# Patient Record
Sex: FEMALE | Race: WHITE | NOT HISPANIC OR LATINO | Employment: PART TIME | URBAN - METROPOLITAN AREA
[De-identification: names, ages, dates, MRNs, and addresses within clinical notes are randomized per-mention and may not be internally consistent; named-entity substitution may affect disease eponyms.]

---

## 2017-08-30 ENCOUNTER — OFFICE VISIT (OUTPATIENT)
Dept: URGENT CARE | Facility: CLINIC | Age: 77
End: 2017-08-30
Payer: MEDICARE

## 2017-08-30 VITALS
OXYGEN SATURATION: 98 % | SYSTOLIC BLOOD PRESSURE: 157 MMHG | HEIGHT: 62 IN | BODY MASS INDEX: 28.71 KG/M2 | DIASTOLIC BLOOD PRESSURE: 76 MMHG | WEIGHT: 156 LBS | TEMPERATURE: 98 F | HEART RATE: 66 BPM

## 2017-08-30 DIAGNOSIS — N39.0 URINARY TRACT INFECTION WITHOUT HEMATURIA, SITE UNSPECIFIED: ICD-10-CM

## 2017-08-30 DIAGNOSIS — R42 DIZZINESS: ICD-10-CM

## 2017-08-30 DIAGNOSIS — B34.9 VIRAL SYNDROME: Primary | ICD-10-CM

## 2017-08-30 LAB
BILIRUB UR QL STRIP: NEGATIVE
CTP QC/QA: YES
FLUAV AG NPH QL: NEGATIVE
FLUBV AG NPH QL: NEGATIVE
GLUCOSE SERPL-MCNC: 142 MG/DL (ref 70–110)
GLUCOSE UR QL STRIP: NEGATIVE
KETONES UR QL STRIP: NEGATIVE
LEUKOCYTE ESTERASE UR QL STRIP: POSITIVE
PH, POC UA: 5.5 (ref 5–8)
POC BLOOD, URINE: NEGATIVE
POC NITRATES, URINE: NEGATIVE
PROT UR QL STRIP: NEGATIVE
SP GR UR STRIP: 1.01 (ref 1–1.03)
UROBILINOGEN UR STRIP-ACNC: NORMAL (ref 0.1–1.1)

## 2017-08-30 PROCEDURE — 82948 REAGENT STRIP/BLOOD GLUCOSE: CPT | Mod: S$GLB,,, | Performed by: FAMILY MEDICINE

## 2017-08-30 PROCEDURE — 1159F MED LIST DOCD IN RCRD: CPT | Mod: S$GLB,,, | Performed by: FAMILY MEDICINE

## 2017-08-30 PROCEDURE — 99203 OFFICE O/P NEW LOW 30 MIN: CPT | Mod: 25,S$GLB,, | Performed by: FAMILY MEDICINE

## 2017-08-30 PROCEDURE — 87804 INFLUENZA ASSAY W/OPTIC: CPT | Mod: QW,S$GLB,, | Performed by: FAMILY MEDICINE

## 2017-08-30 PROCEDURE — 81003 URINALYSIS AUTO W/O SCOPE: CPT | Mod: QW,S$GLB,, | Performed by: FAMILY MEDICINE

## 2017-08-30 PROCEDURE — 1125F AMNT PAIN NOTED PAIN PRSNT: CPT | Mod: S$GLB,,, | Performed by: FAMILY MEDICINE

## 2017-08-30 RX ORDER — ICOSAPENT ETHYL 1000 MG/1
1 CAPSULE ORAL 2 TIMES DAILY
COMMUNITY
End: 2023-03-13

## 2017-08-30 RX ORDER — OMEPRAZOLE AND SODIUM BICARBONATE 40; 1100 MG/1; MG/1
1 CAPSULE ORAL
Status: ON HOLD | COMMUNITY
End: 2020-11-02 | Stop reason: HOSPADM

## 2017-08-30 RX ORDER — FOLIC ACID 0.4 MG
400 TABLET ORAL DAILY
COMMUNITY
End: 2023-03-13

## 2017-08-30 RX ORDER — SULFAMETHOXAZOLE AND TRIMETHOPRIM 800; 160 MG/1; MG/1
1 TABLET ORAL 2 TIMES DAILY
Qty: 14 TABLET | Refills: 0 | Status: SHIPPED | OUTPATIENT
Start: 2017-08-30 | End: 2017-09-06

## 2017-08-30 RX ORDER — ASCORBIC ACID 1000 MG
TABLET ORAL
COMMUNITY
End: 2023-03-13

## 2017-08-30 RX ORDER — ONDANSETRON 4 MG/1
4 TABLET, FILM COATED ORAL EVERY 8 HOURS PRN
Qty: 20 TABLET | Refills: 0 | Status: SHIPPED | OUTPATIENT
Start: 2017-08-30 | End: 2017-09-06

## 2017-08-30 RX ORDER — LANOLIN ALCOHOL/MO/W.PET/CERES
100 CREAM (GRAM) TOPICAL DAILY
COMMUNITY

## 2017-08-30 RX ORDER — NABUMETONE 750 MG/1
750 TABLET, FILM COATED ORAL 2 TIMES DAILY
Status: ON HOLD | COMMUNITY
End: 2020-11-02 | Stop reason: HOSPADM

## 2017-08-30 RX ORDER — ASPIRIN 81 MG/1
81 TABLET ORAL DAILY
COMMUNITY

## 2017-08-30 RX ORDER — FERROUS SULFATE, DRIED 160(50) MG
1 TABLET, EXTENDED RELEASE ORAL 2 TIMES DAILY WITH MEALS
COMMUNITY

## 2017-08-30 RX ORDER — LATANOPROST 50 UG/ML
1 SOLUTION/ DROPS OPHTHALMIC NIGHTLY
COMMUNITY

## 2017-08-30 RX ORDER — CALCIUM CARBONATE 600 MG
600 TABLET ORAL ONCE
COMMUNITY
End: 2023-03-13

## 2017-08-30 RX ORDER — SIMVASTATIN 40 MG/1
40 TABLET, FILM COATED ORAL NIGHTLY
Status: ON HOLD | COMMUNITY
End: 2020-11-01 | Stop reason: HOSPADM

## 2017-08-30 RX ORDER — LOSARTAN POTASSIUM 50 MG/1
50 TABLET ORAL DAILY
Status: ON HOLD | COMMUNITY
End: 2020-11-03 | Stop reason: HOSPADM

## 2017-08-30 NOTE — PROGRESS NOTES
"Subjective:       Patient ID: Ayaka Ellison is a 77 y.o. female.    Vitals:  height is 5' 2" (1.575 m) and weight is 70.8 kg (156 lb). Her temperature is 97.9 °F (36.6 °C). Her blood pressure is 157/76 (abnormal) and her pulse is 66. Her oxygen saturation is 98%.     Chief Complaint: Nausea (Pt. has had intermittent nausea for a week); Generalized Body Aches (Pt. has had body aches for a week); and Headache (Dull headache)    Patient has been having aches and pains and nausea for the past week that isn't resolving.      Nausea   This is a new problem. The current episode started in the past 7 days. The problem occurs constantly. The problem has been unchanged. Associated symptoms include congestion, fatigue, headaches, myalgias, nausea and weakness. The symptoms are aggravated by walking, twisting, standing and exertion. She has tried nothing for the symptoms. The treatment provided no relief.   Headache    This is a new problem. The current episode started in the past 7 days. The problem occurs daily. The problem has been unchanged. The pain is located in the occipital, frontal, parietal and temporal region. The pain does not radiate. The quality of the pain is described as dull. The pain is at a severity of 5/10. The pain is mild. Associated symptoms include muscle aches, nausea and weakness. Nothing aggravates the symptoms. She has tried nothing for the symptoms. The treatment provided no relief.     Review of Systems   Constitution: Positive for fatigue, weakness and malaise/fatigue.   HENT: Positive for congestion and headaches.    Respiratory: Positive for shortness of breath.    Musculoskeletal: Positive for myalgias.   Gastrointestinal: Positive for nausea.       Objective:      Physical Exam   Constitutional: She is oriented to person, place, and time. She appears well-developed and well-nourished.   HENT:   Head: Normocephalic and atraumatic.   Eyes: EOM are normal. Pupils are equal, round, and " reactive to light.   Neck: Normal range of motion. Neck supple. No JVD present. No tracheal deviation present. No thyromegaly present.   Cardiovascular: Normal rate, regular rhythm and normal heart sounds.  Exam reveals no gallop and no friction rub.    No murmur heard.  Pulmonary/Chest: Breath sounds normal. No respiratory distress. She has no wheezes. She has no rales. She exhibits no tenderness.   Abdominal: Soft. Bowel sounds are normal. She exhibits no distension. There is no tenderness. There is no rebound and no guarding.   Genitourinary:   Genitourinary Comments: No bladder tenderness, no cva tenderness.   Musculoskeletal: Normal range of motion. She exhibits no edema, tenderness or deformity.   Lymphadenopathy:     She has no cervical adenopathy.   Neurological: She is alert and oriented to person, place, and time. She displays normal reflexes. No cranial nerve deficit. She exhibits normal muscle tone. Coordination normal.   Skin: Skin is warm. Capillary refill takes less than 2 seconds. No rash noted. No erythema. No pallor.   Psychiatric: She has a normal mood and affect. Her behavior is normal. Judgment and thought content normal.   Vitals reviewed.      Assessment:       1. Viral syndrome    2. Dizziness    3. Urinary tract infection without hematuria, site unspecified        Plan:         Viral syndrome  -     POCT Influenza A/B  -     ondansetron (ZOFRAN) 4 MG tablet; Take 1 tablet (4 mg total) by mouth every 8 (eight) hours as needed for Nausea.  Dispense: 20 tablet; Refill: 0    Dizziness  -     POCT glucose  -     POCT Urinalysis, Dipstick, Automated, W/O Scope    Urinary tract infection without hematuria, site unspecified  -     sulfamethoxazole-trimethoprim 800-160mg (BACTRIM DS) 800-160 mg Tab; Take 1 tablet by mouth 2 (two) times daily.  Dispense: 14 tablet; Refill: 0      Follow up with your doctor in a few days as needed.  Return to the urgent care or go to the ER if symptoms get  worse.    Davide Dent MD

## 2017-08-30 NOTE — PATIENT INSTRUCTIONS
"  Viral Syndrome (Adult)  A viral illness may cause a number of symptoms. The symptoms depend on the part of the body that the virus affects. If it settles in your nose, throat, and lungs, it may cause cough, sore throat, congestion, and sometimes headache. If it settles in your stomach and intestinal tract, it may cause vomiting and diarrhea. Sometimes it causes vague symptoms like "aching all over," feeling tired, loss of appetite, or fever.  A viral illness usually lasts 1 to 2 weeks, but sometimes it lasts longer. In some cases, a more serious infection can look like a viral syndrome in the first few days of the illness. You may need another exam and additional tests to know the difference. Watch for the warning signs listed below.  Home care  Follow these guidelines for taking care of yourself at home:  · If symptoms are severe, rest at home for the first 2 to 3 days.  · Stay away from cigarette smoke - both your smoke and the smoke from others.  · You may use over-the-counter acetaminophen or ibuprofen for fever, muscle aching, and headache, unless another medicine was prescribed for this. If you have chronic liver or kidney disease or ever had a stomach ulcer or GI bleeding, talk with your doctor before using these medicines. No one who is younger than 18 and ill with a fever should take aspirin. It may cause severe disease or death.  · Your appetite may be poor, so a light diet is fine. Avoid dehydration by drinking 8 to 12 8-ounce glasses of fluids each day. This may include water; orange juice; lemonade; apple, grape, and cranberry juice; clear fruit drinks; electrolyte replacement and sports drinks; and decaffeinated teas and coffee. If you have been diagnosed with a kidney disease, ask your doctor how much and what types of fluids you should drink to prevent dehydration. If you have kidney disease, drinking too much fluid can cause it build up in the your body and be dangerous to your " health.  · Over-the-counter remedies won't shorten the length of the illness but may be helpful for cough, sore throat; and nasal and sinus congestion. Don't use decongestants if you have high blood pressure.  Follow-up care  Follow up with your healthcare provider if you do not improve over the next week.  Call 911  Get emergency medical care if any of the following occur:  · Convulsion  · Feeling weak, dizzy, or like you are going to faint  · Chest pain, shortness of breath, wheezing, or difficulty breathing  When to seek medical advice  Call your healthcare provider right away if any of these occur:  · Cough with lots of colored sputum (mucus) or blood in your sputum  · Chest pain, shortness of breath, wheezing, or difficulty breathing  · Severe headache; face, neck, or ear pain  · Severe, constant pain in the lower right side of your belly (abdominal)  · Continued vomiting (cant keep liquids down)  · Frequent diarrhea (more than 5 times a day); blood (red or black color) or mucus in diarrhea  · Feeling weak, dizzy, or like you are going to faint  · Extreme thirst  · Fever of 100.4°F (38°C) or higher, or as directed by your healthcare provider  Date Last Reviewed: 9/25/2015  © 6998-9215 [employer]. [address] [place] [address], [place] [address]. All rights reserved. This information is not intended as a substitute for professional medical care. Always follow your healthcare professional's instructions.      Bladder Infection, Female (Adult)    Urine is normally doesn't have any bacteria in it. But bacteria can get into the urinary tract from the skin around the rectum. Or they can travel in the blood from elsewhere in the body. Once they are in your urinary tract, they can cause infection in the urethra (urethritis), the bladder (cystitis), or the kidneys (pyelonephritis).  The most common place for an infection is in the bladder. This is called a bladder infection. This is one of the most common  "infections in women. Most bladder infections are easily treated. They are not serious unless the infection spreads to the kidney.  The phrases "bladder infection," "UTI," and "cystitis" are often used to describe the same thing. But they are not always the same. Cystitis is an inflammation of the bladder. The most common cause of cystitis is an infection.  Symptoms  The infection causes inflammation in the urethra and bladder. This causes many of the symptoms. The most common symptoms of a bladder infection are:  · Pain or burning when urinating  · Having to urinate more often than usual  · Urgent need to urinate  · Only a small amount of urine comes out  · Blood in urine  · Abdominal discomfort. This is usually in the lower abdomen above the pubic bone.  · Cloudy urine  · Strong- or bad-smelling urine  · Unable to urinate (urinary retention)  · Unable to hold urine in (urinary incontinence)  · Fever  · Loss of appetite  · Confusion (in older adults)  Causes  Bladder infections are not contagious. You can't get one from someone else, from a toilet seat, or from sharing a bath.  The most common cause of bladder infections is bacteria from the bowels. The bacteria get onto the skin around the opening of the urethra. From there, they can get into the urine and travel up to the bladder, causing inflammation and infection. This usually happens because of:  · Wiping improperly after urinating. Always wipe from front to back.  · Bowel incontinence  · Pregnancy  · Procedures such as having a catheter inserted  · Older age  · Not emptying your bladder. This can allow bacteria a chance to grow in your urine.  · Dehydration  · Constipation  · Sex  · Use of a diaphragm for birth control   Treatment  Bladder infections are diagnosed by a urine test. They are treated with antibiotics and usually clear up quickly without complications. Treatment helps prevent a more serious kidney infection.  Medicines  Medicines can help in the " treatment of a bladder infection:  · Take antibiotics until they are used up, even if you feel better. It is important to finish them to make sure the infection has cleared.  · You can use acetaminophen or ibuprofen for pain, fever, or discomfort, unless another medicine was prescribed. If you have chronic liver or kidney disease, talk with your healthcare provider before using these medicines. Also talk with your provider if you've ever had a stomach ulcer or gastrointestinal bleeding, or are taking blood-thinner medicines.  · If you are given phenazopydridine to reduce burning with urination, it will cause your urine to become a bright orange color. This can stain clothing.  Care and prevention  These self-care steps can help prevent future infections:  · Drink plenty of fluids to prevent dehydration and flush out your bladder. Do this unless you must restrict fluids for other health reasons, or your doctor told you not to.  · Proper cleaning after going to the bathroom is important. Wipe from front to back after using the toilet to prevent the spread of bacteria.  · Urinate more often. Don't try to hold urine in for a long time.  · Wear loose-fitting clothes and cotton underwear. Avoid tight-fitting pants.  · Improve your diet and prevent constipation. Eat more fresh fruit and vegetables, and fiber, and less junk and fatty foods.  · Avoid sex until your symptoms are gone.  · Avoid caffeine, alcohol, and spicy foods. These can irritate your bladder.  · Urinate right after intercourse to flush out your bladder.  · If you use birth control pills and have frequent bladder infections, discuss it with your doctor.  Follow-up care  Call your healthcare provider if all symptoms are not gone after 3 days of treatment. This is especially important if you have repeat infections.  If a culture was done, you will be told if your treatment needs to be changed. If directed, you can call to find out the results.  If X-rays were  done, you will be told if the results will affect your treatment.  Call 911  Call 911 if any of the following occur:  · Trouble breathing  · Hard to wake up or confusion  · Fainting or loss of consciousness  · Rapid heart rate  When to seek medical advice  Call your healthcare provider right away if any of these occur:  · Fever of 100.4ºF (38.0ºC) or higher, or as directed by your healthcare provider  · Symptoms are not better by the third day of treatment  · Back or belly (abdominal) pain that gets worse  · Repeated vomiting, or unable to keep medicine down  · Weakness or dizziness  · Vaginal discharge  · Pain, redness, or swelling in the outer vaginal area (labia)  Date Last Reviewed: 10/1/2016  © 7310-5173 [employer]. [address] [place] [address], [place] [address]. All rights reserved. This information is not intended as a substitute for professional medical care. Always follow your healthcare professional's instructions.      Follow up with your doctor in a few days as needed.  Return to the urgent care or go to the ER if symptoms get worse.    Davide Dent MD

## 2017-10-25 DIAGNOSIS — M65.331 TRIGGER FINGER, RIGHT MIDDLE FINGER: Primary | ICD-10-CM

## 2017-10-30 ENCOUNTER — CLINICAL SUPPORT (OUTPATIENT)
Dept: REHABILITATION | Facility: HOSPITAL | Age: 77
End: 2017-10-30
Attending: ORTHOPAEDIC SURGERY
Payer: MEDICARE

## 2017-10-30 DIAGNOSIS — M25.641 STIFFNESS OF FINGER JOINT OF RIGHT HAND: ICD-10-CM

## 2017-10-30 DIAGNOSIS — M79.641 HAND PAIN, NOT ARTHRALGIA, RIGHT: ICD-10-CM

## 2017-10-30 PROCEDURE — G8989 SELF CARE D/C STATUS: HCPCS | Mod: CJ,PO

## 2017-10-30 PROCEDURE — 97165 OT EVAL LOW COMPLEX 30 MIN: CPT | Mod: PO

## 2017-10-30 PROCEDURE — G8987 SELF CARE CURRENT STATUS: HCPCS | Mod: CJ,PO

## 2017-10-30 PROCEDURE — G8988 SELF CARE GOAL STATUS: HCPCS | Mod: CI,PO

## 2017-10-30 NOTE — PLAN OF CARE
Occupational Therapy Hand/ Wrist  Evaluation    Patient: Ayaka Ellison  Date of Evaluation: 10/30/2017  MRN: [id number]    Diagnosis:   1. Hand pain, not arthralgia, right     2. Stiffness of finger joint of right hand       Physician: Atilio Obando III*  Treatment Orders: Eval and treat     Past Medical History:   Diagnosis Date    Arthritis     Cataract     Glaucoma     Hyperlipidemia     Hypertension     Sleep apnea      Current Outpatient Prescriptions   Medication Sig    aspirin (ECOTRIN) 81 MG EC tablet Take 81 mg by mouth once daily.    calcium carbonate (OS-MUNIRA) 600 mg calcium (1,500 mg) Tab Take 600 mg by mouth once.    calcium-vitamin D3 500 mg(1,250mg) -200 unit per tablet Take 1 tablet by mouth 2 (two) times daily with meals.    cyanocobalamin (VITAMIN B-12) 1000 MCG tablet Take 100 mcg by mouth once daily.    folic acid (FOLVITE) 400 MCG tablet Take 400 mcg by mouth once daily.    ginkgo biloba 40 mg Tab Take by mouth.    icosapent ethyl (VASCEPA) 1 gram Cap Take 1 tablet by mouth 2 (two) times daily.    Lactobac no.41-Bifidobact no.7 (PROBIOTIC-10) 70 mg (3 billion cell) Cap Take by mouth.    latanoprost 0.005 % ophthalmic solution 1 drop every evening.    losartan (COZAAR) 50 MG tablet Take 50 mg by mouth once daily.    METFORMIN/AA 7/DEPA706/CHOLINE (METFORMIN-AA7-[id number]-MKNBWR ORAL) Take by mouth.    multivitamin-Ca-iron-minerals 18-0.4 mg Tab Take 1 tablet by mouth.    nabumetone (RELAFEN) 750 MG tablet Take 750 mg by mouth 2 (two) times daily.    omega 3,6,9 combination no.7 92 mg (43 mg-22 [id number]) Chew Take by mouth.    omeprazole-sodium bicarbonate (ZEGERID) 40-1.1 mg-gram per capsule Take 1 capsule by mouth before breakfast.    simvastatin (ZOCOR) 40 MG tablet Take 40 mg by mouth every evening.     No current facility-administered medications for this visit.      Review of patient's allergies indicates:   Allergen Reactions    Penicillins Itching  "    Precautions: universal        Age: 77 y.o.  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  Working presently:  yes  Last time worked:  10/27/17  Workmen's Compensation:   None    Date of onset: 10/17/17  Involved area:  Trigger finger right middle finger  Mechanism of Injury:   Insidious    Environmental Concerns/Fall Risk: None  Language: None  Cultural/Spiritual: None  Barriers to Learning: None   Abuse/Neglect/Nutritional: None  Medications:  See med card   Allergies:  See allergy card   X-Rays/Tests:  See Epic    Subjective:  Pt reports   "the middle still hurts and its tender to the touch"    Pain :  At rest: 3/10  With work/ Activity: 3/10  Sleeping: 3/10  Location of Pain :right palm     Patients goals for therapy are:   To  be able to use hand again     Objective:     Sensation: intact 3.61  Scar / Wound: Slough along lower 2 cm portion of 5 cm incision. Dark granular scabbing   Edema:  7.5 cm base of middle finger      Range of Motion (in degrees):    Digits:  AROM (Ext/Flex) MCP PIP DIP TAMS          LF 5/55 10/86 5/70 191                                              AROM Thumb exten/flex  Mp: WNL  IP:WNL    AROM:   Thumb radial ABD:WNL  Thumb patel ABD: WNL      AROM:  Thumb opposition: thumb to WNL                                                         AROM   (R)    /   (L)     Wrist extension : 55   Wrist  Flexion: 50  Radial Deviation: WNL  Ulnar Deviation: WNL   Elbow Extension:  WNL  Elbow Flexion:      WNL  Supination:WNL  Pronation:WNL              Strength: (KAYDEN Dynamometer in lbs.), Position II  (R): TBD  (L): TBD    Pinch Strength: (Pinch Gauge in psis),       (R) /  (L)  LAT:TBD  3PT:TBD  2PT:TBD    9 hole pe seconds     Treatment included OT evaluation, the following exercises (HEP) were instructed and pt was able to demonstrate them prior to the end of the session. HEP are as follows: Pt provided detailed verbal instructions on wound " care, nature of diagnosis and therapy process. Pt instructed in tendon glides and light AROM therex.           Assessment: Pt arrives for an occupational therapy evaluation 2.5 weeks S/P post right middle finger trigger finger release and ganglion cyst removal. Pt presents with poor wound healing around incision. Thick granular scabbing removed to reveal fresh dermis. Slough removed wound was cleansed and dressed. Pt will be following up with MD to remove sutures. The patient reports functional difficulty with carrying household objects and fine motor. The patient works as a  and reports significant functional deficits related to work and ADL's. Occupational therapy services are recommended at this time.     Problem List :       Decreased ROM,  Scar formation  Decreased  and pinch strength,   Decreased muscle strength,   Decreased functional hand use,  Increased pain,   Edema    Profile and History Assessment of Occupational Performance Level of Clinical Decision Making Complexity Score   Occupational Profile:   Ayaka Ellison is a 77 y.o. female who lives with their family (daughter and boyfriend) her second daughter lives nearby and is currently employed as [employer]. Ayaka Ellison has difficulty with  dressing  housework/household chores  affecting his/her daily functional abilities. Pt reports difficulties with buttoning and carrying groceries. His/her main goal for therapy is To have my fingers straight .   Comorbidities:   none  Medical and Therapy History Review:   Brief  ?  ?  ?  ?  ?  ? Performance Deficits  Physical:  Joint Mobility  Muscle Power/Strength  Muscle Endurance  Skin Integrity/Scar Formation  Edema   Strength  Pinch Strength  Fine Motor Coordination  Tactile Functions  Pain  Cognitive:  No Deficits  Psychosocial:   No Deficits  ? Clinical Decision Making:  low  Assessment Process:  Problem-Focused Assessments  Modification/Need for Assistance:  Not  Necessary  Intervention Selection:  Multiple Treatment Options  ? moderate  Based on PMHX, co morbidities , data from assessments and functional level of assistance required with task and clinical presentation directly impacting function.        G CODE TOOL: FOTO    Category: Self Care      Current Score  =  37%  impaired  Goal at Discharge Score =  25% impaired    Score interpretation is as follows:     TEST SCORE  Modifier  Impairment Limitation Restriction    0%  CH  0 % impaired, limited or restricted    1-19%  CI  @ least 1% but less than 20% impaired, limited or restricted    20-39%  CJ  @ least 20%<40% impaired, limited or restricted    40-59%  CK  @ least 40%<60% impaired, limited or restricted    60-79%  CL  @ least 60% <80% impaired, limited or restricted    80-99%  CM  @ least 80%<100% impaired limited or restricted    100%  CN  100% impaired, limited or restricted         Goals to be met :  1) Patient to be IND with HEP and modalities for pain management by 6-8 weeks.  2)  Pt. will increase ROM 3-5 degrees to increase functional hand use for ADLs by 6-8 weeks.  3)   Pt. will increase  strength 3-5 lbs. to grasp cup by 6-8 weeks.  4)   Pt. will increase pinch 1-3 psis for buttoning by 6-8 weeks.  5)   Pt. will decrease edema .1-.3 mm to increase joint mobility /flexibility for hand use by 6-8 weeks.   6) Pt will score FOTO score of 20% less than current score and obtain goal score.       Plan:   Patient to be treated by Occupational Therapy    1-2    times per week for  60 days   during the certification period from   10/30/17  to 12/30/17     to achieve the established goals.  Treatment to include :  paraffin, fluidotherapy, manual therapy/joint mobilizations,  modalities for pain management, iontophoresis with dexamethasone, US 3mhz, therapeutic exercises/activities,        strengthening,    as well as any other treatments deemed necessary based on the patient's needs or progress.                 I  certify the need for these services furnished under this plan of treatment and while under my care    ____________________________________                         __________________  Physician/Referring Practitioner                                               Date of Signature

## 2017-11-08 ENCOUNTER — CLINICAL SUPPORT (OUTPATIENT)
Dept: REHABILITATION | Facility: HOSPITAL | Age: 77
End: 2017-11-08
Attending: ORTHOPAEDIC SURGERY
Payer: MEDICARE

## 2017-11-08 DIAGNOSIS — M79.641 HAND PAIN, NOT ARTHRALGIA, RIGHT: ICD-10-CM

## 2017-11-08 DIAGNOSIS — M25.641 STIFFNESS OF FINGER JOINT OF RIGHT HAND: ICD-10-CM

## 2017-11-08 PROCEDURE — 97035 APP MDLTY 1+ULTRASOUND EA 15: CPT | Mod: PO

## 2017-11-08 PROCEDURE — 97140 MANUAL THERAPY 1/> REGIONS: CPT | Mod: PO

## 2017-11-08 PROCEDURE — 97110 THERAPEUTIC EXERCISES: CPT | Mod: PO

## 2017-11-08 NOTE — PROGRESS NOTES
"Occupational Therapy Progress Note    Patient: Ayaka Ellison  Date of Evaluation: 2017  MRN: [id number]  Diagnosis:   Encounter Diagnoses   Name Primary?    Hand pain, not arthralgia, right     Stiffness of finger joint of right hand      Physician: Atilio Obando III*    Total Treatment time: 60  Group Time: 0    Time in:1:00 pm  Time out: 2:00 pm      Visit Number 20  Foto at 5th visit: Visit number: 2         Total Timed Minutes:  55  Total Timed Units:  4  Total Untimed Units:  0  Charges Billed/# of units:  2 TE 2 MT    Subjective: Pt reported, "its feeling better than last time" Post therapy : " I have not seen my finger this straight in 10 years"     Pain upon arrival:  2/10  Pain post session: 2/10    Objective:   Sensation: intact 3.61  Scar / Wound: Slough along lower 2 cm portion of 5 cm incision. Dark granular scabbing   Edema:  7.5 cm base of middle finger       Range of Motion (in degrees):     Digits:  AROM (Ext/Flex) MCP PIP DIP TAMS               LF 5/55 10/86 5/70 191                                                          AROM Thumb exten/flex  Mp: WNL  IP:WNL     AROM:   Thumb radial ABD:WNL  Thumb patel ABD: WNL        AROM:  Thumb opposition: thumb to WNL                                                         AROM   (R)    /   (L)      Wrist extension : 55   Wrist  Flexion: 50  Radial Deviation: WNL  Ulnar Deviation: WNL   Elbow Extension:  WNL  Elbow Flexion:      WNL  Supination:WNL  Pronation:WNL                Strength: (KAYDEN Dynamometer in lbs.), Position II  (R): TBD  (L): TBD     Pinch Strength: (Pinch Gauge in psis),                (R) /  (L)  LAT:TBD  3PT:TBD  2PT:TBD     9 hole pe seconds      Treatment included OT evaluation, the following exercises (HEP) were instructed and pt was able to demonstrate them prior to the end of the session. HEP are as follows: Pt provided detailed verbal instructions on wound care, nature of diagnosis and therapy process. " Pt instructed in tendon glides and light AROM therex.       Treatment: Patient received paraffin bath to right hand(s) for 8 minutes to increase blood flow, circulation, pain management and for tissue elasticity prior to therex. Patient received ultrasound to  [place]mar surface near surgical site area to increase blood flow, circulation, tissue elasticity, pain management and for wound/scar management for 8minutes @ 3.3 Mhz, Intensity .8 w/cm2 at 100% duty cycle.  Thera putty exercises free style manipulation of soft yellow putty. Intrinsic muscle scissoring against putty. Gentle removal of gripper and pegs.       Assessment:      Pt seen for continued therapy services to guide through her post operative rehab. The patients wound presents with significant improvement with appearance and texture. Gentle PROM stretching to improve PIP extension was highly beneficial in increasing the extensor lag. Joint stiffness was resolved during therapy visit. Pts HEP was progressed to independently implement gentle PROM stretching.       New/Revised Goals:  continue with POC      Plan:    Goals to be met :  1) Patient to be IND with HEP and modalities for pain management by 6-8 weeks.  2)  Pt. will increase ROM 3-5 degrees to increase functional hand use for ADLs by 6-8 weeks.  3)   Pt. will increase  strength 3-5 lbs. to grasp cup by 6-8 weeks.  4)   Pt. will increase pinch 1-3 psis for buttoning by 6-8 weeks.  5)   Pt. will decrease edema .1-.3 mm to increase joint mobility /flexibility for hand use by 6-8 weeks.   6) Pt will score FOTO score of 20% less than current score and obtain goal score.         Plan:   Patient to be treated by Occupational Therapy    1-2    times per week for  60 days   during the certification period from   10/30/17  to 12/30/17     to achieve the established goals.

## 2017-11-10 ENCOUNTER — CLINICAL SUPPORT (OUTPATIENT)
Dept: REHABILITATION | Facility: HOSPITAL | Age: 77
End: 2017-11-10
Attending: ORTHOPAEDIC SURGERY
Payer: MEDICARE

## 2017-11-10 DIAGNOSIS — M25.641 STIFFNESS OF FINGER JOINT OF RIGHT HAND: ICD-10-CM

## 2017-11-10 DIAGNOSIS — M79.641 HAND PAIN, NOT ARTHRALGIA, RIGHT: ICD-10-CM

## 2017-11-10 PROCEDURE — 97018 PARAFFIN BATH THERAPY: CPT | Mod: PO

## 2017-11-10 PROCEDURE — 97140 MANUAL THERAPY 1/> REGIONS: CPT | Mod: PO

## 2017-11-10 PROCEDURE — 97110 THERAPEUTIC EXERCISES: CPT | Mod: PO

## 2017-11-10 NOTE — PROGRESS NOTES
"Occupational Therapy Progress Note    Patient: Ayaka Ellison  Date of Evaluation: 11/10/2017  MRN: [id number]  Diagnosis:   Encounter Diagnoses   Name Primary?    Hand pain, not arthralgia, right     Stiffness of finger joint of right hand      Physician: Atilio Obando III*    Total Treatment time: 60  Group Time: 0    Time in:2:00 pm  Time out: 3:00 pm      Visit Number 20  Foto at 5th visit: Visit number:4        Total Timed Minutes:  55  Total Timed Units:  4  Total Untimed Units:  0  Charges Billed/# of units:  2 TE 2 MT    Subjective: Pt reported, "its feeling better than last time" Post therapy : " I have not seen my finger this straight in 10 years"     Pain upon arrival:  2/10  Pain post session: 2/10    Objective:   Sensation: intact 3.61  Scar / Wound: Slough along lower 2 cm portion of 5 cm incision. Dark granular scabbing   Edema:  7.5 cm base of middle finger       Range of Motion (in degrees):     Digits:  AROM (Ext/Flex) MCP PIP DIP TAMS               LF 5/55 10/86 5/70 191                                                          AROM Thumb exten/flex  Mp: WNL  IP:WNL     AROM:   Thumb radial ABD:WNL  Thumb patel ABD: WNL        AROM:  Thumb opposition: thumb to WNL                                                         AROM   (R)    /   (L)      Wrist extension : 55   Wrist  Flexion: 50  Radial Deviation: WNL  Ulnar Deviation: WNL   Elbow Extension:  WNL  Elbow Flexion:      WNL  Supination:WNL  Pronation:WNL                Strength: (KAYDEN Dynamometer in lbs.), Position II  (R): TBD  (L): TBD     Pinch Strength: (Pinch Gauge in psis),                (R) /  (L)  LAT:TBD  3PT:TBD  2PT:TBD     9 hole pe seconds      Treatment included OT evaluation, the following exercises (HEP) were instructed and pt was able to demonstrate them prior to the end of the session. HEP are as follows: Pt provided detailed verbal instructions on wound care, nature of diagnosis and therapy process. Pt " instructed in tendon glides and light AROM therex.       Treatment: Patient received paraffin bath to right hand(s) for 8 minutes to increase blood flow, circulation, pain management and for tissue elasticity prior to therex. Patient received ultrasound to  Palmar surface near surgical site area to increase blood flow, circulation, tissue elasticity, pain management and for wound/scar management for 8minutes @ 3.3 Mhz, Intensity .8 w/cm2 at 100% duty cycle.  Scar massage to surgical site.Thera putty exercises free style manipulation of soft yellow putty. Intrinsic muscle scissoring against putty. Gentle removal of gripper and pegs. Paper crumple x 20      Assessment:      Pt seen for continued therapy services and presents with notable improvement with strength and reports increased functional use of the hand. Scar mobility shows improvement.  Pt provided with light assistive device to promote PIP extension. Painful gripping has resolved. Pt continues to report improved functional use.     New/Revised Goals:  continue with POC      Plan:    Goals to be met :  1) Patient to be IND with HEP and modalities for pain management by 6-8 weeks.  2)  Pt. will increase ROM 3-5 degrees to increase functional hand use for ADLs by 6-8 weeks.  3)   Pt. will increase  strength 3-5 lbs. to grasp cup by 6-8 weeks.  4)   Pt. will increase pinch 1-3 psis for buttoning by 6-8 weeks.  5)   Pt. will decrease edema .1-.3 mm to increase joint mobility /flexibility for hand use by 6-8 weeks.   6) Pt will score FOTO score of 20% less than current score and obtain goal score.         Plan:   Patient to be treated by Occupational Therapy    1-2    times per week for  60 days   during the certification period from   10/30/17  to 12/30/17     to achieve the established goals.

## 2017-11-13 ENCOUNTER — CLINICAL SUPPORT (OUTPATIENT)
Dept: REHABILITATION | Facility: HOSPITAL | Age: 77
End: 2017-11-13
Attending: ORTHOPAEDIC SURGERY
Payer: MEDICARE

## 2017-11-13 DIAGNOSIS — M79.641 HAND PAIN, NOT ARTHRALGIA, RIGHT: ICD-10-CM

## 2017-11-13 DIAGNOSIS — M25.641 STIFFNESS OF FINGER JOINT OF RIGHT HAND: ICD-10-CM

## 2017-11-13 PROCEDURE — 97140 MANUAL THERAPY 1/> REGIONS: CPT | Mod: PO

## 2017-11-13 PROCEDURE — 97110 THERAPEUTIC EXERCISES: CPT | Mod: PO

## 2017-11-13 PROCEDURE — 97035 APP MDLTY 1+ULTRASOUND EA 15: CPT | Mod: PO

## 2017-11-13 NOTE — PROGRESS NOTES
"Occupational Therapy Progress Note    Patient: Ayaka Ellison  Date of Evaluation: 2017  MRN: [id number]  Diagnosis:   Encounter Diagnoses   Name Primary?    Hand pain, not arthralgia, right     Stiffness of finger joint of right hand      Physician: Atilio Obando III*    Total Treatment time: 60  Group Time: 0    Time in:2:00 pm  Time out: 3:00 pm      Visit Number 20  Foto at 5th visit: Visit number:4        Total Timed Minutes:  55  Total Timed Units:  4  Total Untimed Units:  0  Charges Billed/# of units:  2 TE 2 MT    Subjective: Pt reported, "its feeling better than last time" Post therapy : " I have not seen my finger this straight in 10 years"     Pain upon arrival:  2/10  Pain post session: 2/10    Objective:   Sensation: intact 3.61  Scar / Wound: Slough along lower 2 cm portion of 5 cm incision. Dark granular scabbing   Edema:  7.5 cm base of middle finger       Range of Motion (in degrees):     Digits:  AROM (Ext/Flex) MCP PIP DIP TAMS               LF 5/55 10/86 5/70 191                                                          AROM Thumb exten/flex  Mp: WNL  IP:WNL     AROM:   Thumb radial ABD:WNL  Thumb patel ABD: WNL        AROM:  Thumb opposition: thumb to WNL                                                         AROM   (R)    /   (L)      Wrist extension : 55   Wrist  Flexion: 50  Radial Deviation: WNL  Ulnar Deviation: WNL   Elbow Extension:  WNL  Elbow Flexion:      WNL  Supination:WNL  Pronation:WNL                Strength: (KAYDEN Dynamometer in lbs.), Position II  (R): TBD  (L): TBD     Pinch Strength: (Pinch Gauge in psis),                (R) /  (L)  LAT:TBD  3PT:TBD  2PT:TBD     9 hole pe seconds      Treatment included OT evaluation, the following exercises (HEP) were instructed and pt was able to demonstrate them prior to the end of the session. HEP are as follows: Pt provided detailed verbal instructions on wound care, nature of diagnosis and therapy process. Pt " instructed in tendon glides and light AROM therex.       Treatment: Patient received paraffin bath to right hand(s) for 8 minutes to increase blood flow, circulation, pain management and for tissue elasticity prior to therex. Patient received ultrasound to  [place]mar surface near surgical site area to increase blood flow, circulation, tissue elasticity, pain management and for wound/scar management for 8minutes @ 3.3 Mhz, Intensity .8 w/cm2 at 100% duty cycle.  Scar massage to surgical site.Thera putty exercises free style manipulation of soft yellow putty. Intrinsic muscle scissoring against putty. Gentle removal of gripper and pegs. 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 Pain continues to resolve. The patient demonstrates good reproduction HEP and self PROM program. Pt transitioning to strengtheining program with continued blas. Scar mobility remains poor and continues to be addressed through manual techniques.     New/Revised Goals:  continue with POC      Plan:    Goals to be met :  1) Patient to be IND with HEP and modalities for pain management by 6-8 weeks.  2)  Pt. will increase ROM 3-5 degrees to increase functional hand use for ADLs by 6-8 weeks.  3)   Pt. will increase  strength 3-5 lbs. to grasp cup by 6-8 weeks.  4)   Pt. will increase pinch 1-3 psis for buttoning by 6-8 weeks.  5)   Pt. will decrease edema .1-.3 mm to increase joint mobility /flexibility for hand use by 6-8 weeks.   6) Pt will score FOTO score of 20% less than current score and obtain goal score.         Plan:   Patient to be treated by Occupational Therapy    1-2    times per week for  60 days   during the certification period from   10/30/17  to 12/30/17     to achieve the established goals.

## 2017-11-15 ENCOUNTER — CLINICAL SUPPORT (OUTPATIENT)
Dept: REHABILITATION | Facility: HOSPITAL | Age: 77
End: 2017-11-15
Attending: ORTHOPAEDIC SURGERY
Payer: MEDICARE

## 2017-11-15 DIAGNOSIS — M25.641 STIFFNESS OF FINGER JOINT OF RIGHT HAND: ICD-10-CM

## 2017-11-15 DIAGNOSIS — M79.641 HAND PAIN, NOT ARTHRALGIA, RIGHT: ICD-10-CM

## 2017-11-15 PROCEDURE — 97110 THERAPEUTIC EXERCISES: CPT | Mod: PO

## 2017-11-15 PROCEDURE — 97140 MANUAL THERAPY 1/> REGIONS: CPT | Mod: PO

## 2017-11-15 PROCEDURE — 97035 APP MDLTY 1+ULTRASOUND EA 15: CPT | Mod: PO

## 2017-11-15 NOTE — PROGRESS NOTES
"Occupational Therapy Progress Note    Patient: Ayaka Ellison  Date of Evaluation: 11/15/2017  MRN: [id number]  Diagnosis:   Encounter Diagnoses   Name Primary?    Hand pain, not arthralgia, right     Stiffness of finger joint of right hand      Physician: Atilio Obando III*    Total Treatment time: 60  Group Time: 0    Time in:2:00 pm  Time out: 3:00 pm      Visit Number 20  Foto at 5th visit: Visit number:5         Total Timed Minutes:  55  Total Timed Units:  4  Total Untimed Units:  0  Charges Billed/# of units:  2 TE 1 MT 1 US    Subjective: Pt reported, "I find its still much better, it gets straighter every day"     G CODE TOOL: FOTO    Category: Self care      Current Score  = 98% or 2% impaired      Score interpretation is as follows:     TEST SCORE  Modifier  Impairment Limitation Restriction    0%  CH  0 % impaired, limited or restricted    1-19%  CI  @ least 1% but less than 20% impaired, limited or restricted    20-39%  CJ  @ least 20%<40% impaired, limited or restricted    40-59%  CK  @ least 40%<60% impaired, limited or restricted    60-79%  CL  @ least 60% <80% impaired, limited or restricted    80-99%  CM  @ least 80%<100% impaired limited or restricted    100%  CN  100% impaired, limited or restricted             Pain upon arrival:  2/10  Pain post session: 2/10    Objective:   Sensation: intact 3.61  Scar / Wound: Slough along lower 2 cm portion of 5 cm incision. Dark granular scabbing   Edema:  7.5 cm base of middle finger       Range of Motion (in degrees):     Digits:  AROM (Ext/Flex) MCP PIP DIP TAMS               LF 5/55 10/86 5/70 191                                                          AROM Thumb exten/flex  Mp: WNL  IP:WNL     AROM:   Thumb radial ABD:WNL  Thumb patel ABD: WNL        AROM:  Thumb opposition: thumb to WNL                                                         AROM   (R)    /   (L)      Wrist extension : 55   Wrist  Flexion: 50  Radial Deviation: WNL  Ulnar " Deviation: WNL   Elbow Extension:  WNL  Elbow Flexion:      WNL  Supination:WNL  Pronation:WNL                Strength: (KAYDEN Dynamometer in lbs.), Position II  (R): TBD  (L): TBD     Pinch Strength: (Pinch Gauge in psis),                (R) /  (L)  LAT:TBD  3PT:TBD  2PT:TBD     9 hole pe seconds      Treatment included OT evaluation, the following exercises (HEP) were instructed and pt was able to demonstrate them prior to the end of the session. HEP are as follows: Pt provided detailed verbal instructions on wound care, nature of diagnosis and therapy process. Pt instructed in tendon glides and light AROM therex.       Treatment: Patient received paraffin bath to right hand(s) for 8 minutes to increase blood flow, circulation, pain management and for tissue elasticity prior to therex. Patient received ultrasound to  [place]mar surface near surgical site area to increase blood flow, circulation, tissue elasticity, pain management and for wound/scar management for 8minutes @ 3.3 Mhz, Intensity .8 w/cm2 at 100% duty cycle.  Scar massage to surgical site.Thera putty exercises free style manipulation of soft yellow putty. Intrinsic muscle scissoring against putty. Gentle removal of gripper and pegs. Paper crumple x 20      Assessment:     Pt continues to arrive to therapy with a mild flexion contracture in the pip joint of the middle finger. Contracture resolves shortly following light PROM stretching. The patient reports compliance with her PROM stretching HEP with use of joint sindy type static progressive splint. The patient was provided FOTO on this visit and appears to self report improved functional use.   New/Revised Goals:  continue with POC      Plan:    Goals to be met :  1) Patient to be IND with HEP and modalities for pain management by 6-8 weeks.  2)  Pt. will increase ROM 3-5 degrees to increase functional hand use for ADLs by 6-8 weeks.  3)   Pt. will increase  strength 3-5 lbs. to grasp cup by  6-8 weeks.  4)   Pt. will increase pinch 1-3 psis for buttoning by 6-8 weeks.  5)   Pt. will decrease edema .1-.3 mm to increase joint mobility /flexibility for hand use by 6-8 weeks.   6) Pt will score FOTO score of 20% less than current score and obtain goal score.         Plan:   Patient to be treated by Occupational Therapy    1-2    times per week for  60 days   during the certification period from   10/30/17  to 12/30/17     to achieve the established goals.

## 2017-11-22 ENCOUNTER — CLINICAL SUPPORT (OUTPATIENT)
Dept: REHABILITATION | Facility: HOSPITAL | Age: 77
End: 2017-11-22
Attending: ORTHOPAEDIC SURGERY
Payer: MEDICARE

## 2017-11-22 DIAGNOSIS — M25.641 STIFFNESS OF FINGER JOINT OF RIGHT HAND: ICD-10-CM

## 2017-11-22 DIAGNOSIS — M79.641 HAND PAIN, NOT ARTHRALGIA, RIGHT: ICD-10-CM

## 2017-11-22 PROCEDURE — 97035 APP MDLTY 1+ULTRASOUND EA 15: CPT | Mod: PO

## 2017-11-22 PROCEDURE — 97110 THERAPEUTIC EXERCISES: CPT | Mod: PO

## 2017-11-22 PROCEDURE — 97140 MANUAL THERAPY 1/> REGIONS: CPT | Mod: PO

## 2017-11-22 NOTE — PROGRESS NOTES
"Occupational Therapy Progress Note    Patient: Ayaka Ellison  Date of Evaluation: 11/22/2017  MRN: [id number]  Diagnosis:   1. Hand pain, not arthralgia, right     2. Stiffness of finger joint of right hand       Physician: Atilio Obando III*    Total Treatment time: 60  Group Time: 0    Time in:11:00 am  Time out: 12:00 am      Visit Number 20  Foto at 5th visit: Visit number:5         Total Timed Minutes:  55  Total Timed Units:  4  Total Untimed Units:  0  Charges Billed/# of units:  2 TE 1 MT 1 US    Subjective: Pt reported, "its still a little stiff but the thing you gave me still works"     G CODE TOOL: FOTO    Category: Self care      Current Score  = 98% or 2% impaired      Score interpretation is as follows:     TEST SCORE  Modifier  Impairment Limitation Restriction    0%  CH  0 % impaired, limited or restricted    1-19%  CI  @ least 1% but less than 20% impaired, limited or restricted    20-39%  CJ  @ least 20%<40% impaired, limited or restricted    40-59%  CK  @ least 40%<60% impaired, limited or restricted    60-79%  CL  @ least 60% <80% impaired, limited or restricted    80-99%  CM  @ least 80%<100% impaired limited or restricted    100%  CN  100% impaired, limited or restricted             Pain upon arrival:  2/10  Pain post session: 2/10    Objective:   Sensation: intact 3.61  Scar / Wound: Slough along lower 2 cm portion of 5 cm incision. Dark granular scabbing   Edema:  7.5 cm base of middle finger       Range of Motion (in degrees):     Digits:  AROM (Ext/Flex) MCP PIP DIP TAMS               LF 5/55 10/86 5/70 191                                                          AROM Thumb exten/flex  Mp: WNL  IP:WNL     AROM:   Thumb radial ABD:WNL  Thumb patel ABD: WNL        AROM:  Thumb opposition: thumb to WNL                                                         AROM   (R)    /   (L)      Wrist extension : 55   Wrist  Flexion: 50  Radial Deviation: WNL  Ulnar Deviation: WNL   Elbow " Extension:  WNL  Elbow Flexion:      WNL  Supination:WNL  Pronation:WNL                Strength: (KAYDEN Dynamometer in lbs.), Position II  (R): TBD  (L): TBD     Pinch Strength: (Pinch Gauge in psis),                (R) /  (L)  LAT:TBD  3PT:TBD  2PT:TBD     9 hole pe seconds      Treatment included OT evaluation, the following exercises (HEP) were instructed and pt was able to demonstrate them prior to the end of the session. HEP are as follows: Pt provided detailed verbal instructions on wound care, nature of diagnosis and therapy process. Pt instructed in tendon glides and light AROM therex.       Treatment: Patient received paraffin bath to right hand(s) for 8 minutes to increase blood flow, circulation, pain management and for tissue elasticity prior to therex. Patient received ultrasound to  [place]mar surface near surgical site area to increase blood flow, circulation, tissue elasticity, pain management and for wound/scar management for 8minutes @ 3.3 Mhz, Intensity .8 w/cm2 at 100% duty cycle.  Scar massage to surgical site.Thera putty exercises free style manipulation of soft yellow putty. Intrinsic muscle scissoring against putty. Gentle removal of gripper and pegs. 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 Pts scar is fully resolved tissue extensibility is optimal. Mild reports of pain along base of finger. Pt reports that she is able to independently resolve ROM deficits throughout the day but stiffness returns in the morning. Pt has less pain on palpation and is able to perform resistive theraputty activities with improved endurance and strength.     Plan:    Goals to be met :  1) Patient to be IND with HEP and modalities for pain management by 6-8 weeks.  2)  Pt. will increase ROM 3-5 degrees to increase functional hand use for ADLs by 6-8 weeks.  3)   Pt. will increase  strength 3-5 lbs. to grasp cup by 6-8 weeks.  4)   Pt. will increase pinch  1-3 psis for buttoning by 6-8 weeks.  5)   Pt. will decrease edema .1-.3 mm to increase joint mobility /flexibility for hand use by 6-8 weeks.   6) Pt will score FOTO score of 20% less than current score and obtain goal score.         Plan:   Patient to be treated by Occupational Therapy    1-2    times per week for  60 days   during the certification period from   10/30/17  to 12/30/17     to achieve the established goals.

## 2017-11-27 ENCOUNTER — CLINICAL SUPPORT (OUTPATIENT)
Dept: REHABILITATION | Facility: HOSPITAL | Age: 77
End: 2017-11-27
Attending: ORTHOPAEDIC SURGERY
Payer: MEDICARE

## 2017-11-27 DIAGNOSIS — M25.641 STIFFNESS OF FINGER JOINT OF RIGHT HAND: ICD-10-CM

## 2017-11-27 DIAGNOSIS — M79.641 HAND PAIN, NOT ARTHRALGIA, RIGHT: ICD-10-CM

## 2017-11-27 PROCEDURE — 97035 APP MDLTY 1+ULTRASOUND EA 15: CPT | Mod: PO

## 2017-11-27 PROCEDURE — 97140 MANUAL THERAPY 1/> REGIONS: CPT | Mod: PO

## 2017-11-27 PROCEDURE — 97110 THERAPEUTIC EXERCISES: CPT | Mod: PO

## 2017-11-27 NOTE — PROGRESS NOTES
"Occupational Therapy Progress Note    Patient: Ayaka Ellison  Date of Evaluation: 11/27/2017  MRN: [id number]  Diagnosis:   1. Hand pain, not arthralgia, right     2. Stiffness of finger joint of right hand       Physician: Atilio Obando III*    Total Treatment time: 60  Group Time: 0    Time in:11:00 am  Time out: 12:00 am      Visit Number 20  Foto at 5th visit: Visit number:5         Total Timed Minutes:  60  Total Timed Units:  4  Total Untimed Units:  0  Charges Billed/# of units:  2 TE 1 MT 1 US    Subjective: Pt reported, "it was a little painful last evening, "     G CODE TOOL: FOTO    Category: Self care      Current Score  = 98% or 2% impaired      Score interpretation is as follows:     TEST SCORE  Modifier  Impairment Limitation Restriction    0%  CH  0 % impaired, limited or restricted    1-19%  CI  @ least 1% but less than 20% impaired, limited or restricted    20-39%  CJ  @ least 20%<40% impaired, limited or restricted    40-59%  CK  @ least 40%<60% impaired, limited or restricted    60-79%  CL  @ least 60% <80% impaired, limited or restricted    80-99%  CM  @ least 80%<100% impaired limited or restricted    100%  CN  100% impaired, limited or restricted             Pain upon arrival:  2/10  Pain post session: 2/10    Objective:   Sensation: intact 3.61  Scar / Wound: Slough along lower 2 cm portion of 5 cm incision. Dark granular scabbing   Edema:  7.5 cm base of middle finger       Range of Motion (in degrees):     Digits:  AROM (Ext/Flex) MCP PIP DIP TAMS               LF 5/55 10/86 5/70 191                                                          AROM Thumb exten/flex  Mp: WNL  IP:WNL     AROM:   Thumb radial ABD:WNL  Thumb patel ABD: WNL        AROM:  Thumb opposition: thumb to WNL                                                         AROM   (R)    /   (L)      Wrist extension : 55   Wrist  Flexion: 50  Radial Deviation: WNL  Ulnar Deviation: WNL   Elbow Extension:  WNL  Elbow " Flexion:      WNL  Supination:WNL  Pronation:WNL                Strength: (KAYDEN Dynamometer in lbs.), Position II  (R): TBD  (L): TBD     Pinch Strength: (Pinch Gauge in psis),                (R) /  (L)  LAT:TBD  3PT:TBD  2PT:TBD     9 hole pe seconds      Treatment included OT evaluation, the following exercises (HEP) were instructed and pt was able to demonstrate them prior to the end of the session. HEP are as follows: Pt provided detailed verbal instructions on wound care, nature of diagnosis and therapy process. Pt instructed in tendon glides and light AROM therex.       Treatment: Patient received paraffin bath to right hand(s) for 8 minutes to increase blood flow, circulation, pain management and for tissue elasticity prior to therex. Patient received ultrasound to  Palmar surface near surgical site area to increase blood flow, circulation, tissue elasticity, pain management and for wound/scar management for 8minutes @ 3.3 Mhz, Intensity .8 w/cm2 at 100% duty cycle.  Scar massage to surgical site.Thera putty exercises free style manipulation of soft yellow putty. Intrinsic muscle scissoring against putty. Gentle removal of gripper and pegs. Paper crumple x 20      Assessment:     Increased functional use has resulted in mild pain increase. Therex directed towards improving finger extension strength may result in decreased extension lag 2' to joint stiffness. The patient had difficulty performing rubber band expansion in a dynamic fashion.      Plan:    Goals to be met :  1) Patient to be IND with HEP and modalities for pain management by 6-8 weeks.  2)  Pt. will increase ROM 3-5 degrees to increase functional hand use for ADLs by 6-8 weeks.  3)   Pt. will increase  strength 3-5 lbs. to grasp cup by 6-8 weeks.  4)   Pt. will increase pinch 1-3 psis for buttoning by 6-8 weeks.  5)   Pt. will decrease edema .1-.3 mm to increase joint mobility /flexibility for hand use by 6-8 weeks.   6) Pt will  score FOTO score of 20% less than current score and obtain goal score.         Plan:   Patient to be treated by Occupational Therapy    1-2    times per week for  60 days   during the certification period from   10/30/17  to 12/30/17     to achieve the established goals.

## 2017-11-29 ENCOUNTER — CLINICAL SUPPORT (OUTPATIENT)
Dept: REHABILITATION | Facility: HOSPITAL | Age: 77
End: 2017-11-29
Attending: ORTHOPAEDIC SURGERY
Payer: MEDICARE

## 2017-11-29 DIAGNOSIS — M79.641 HAND PAIN, NOT ARTHRALGIA, RIGHT: ICD-10-CM

## 2017-11-29 DIAGNOSIS — M25.641 STIFFNESS OF FINGER JOINT OF RIGHT HAND: ICD-10-CM

## 2017-11-29 PROCEDURE — 97140 MANUAL THERAPY 1/> REGIONS: CPT | Mod: PO

## 2017-11-29 PROCEDURE — 97110 THERAPEUTIC EXERCISES: CPT | Mod: PO

## 2017-11-29 PROCEDURE — 97035 APP MDLTY 1+ULTRASOUND EA 15: CPT | Mod: PO

## 2017-11-29 NOTE — PROGRESS NOTES
"Occupational Therapy Progress Note    Patient: Ayaka Ellison  Date of Evaluation: 11/29/2017  MRN: [id number]  Diagnosis:   1. Hand pain, not arthralgia, right     2. Stiffness of finger joint of right hand       Physician: Atilio Obando III*    Total Treatment time: 60  Group Time: 0    Time in:10:55 am  Time out: 11:40 am       Visit Number 20  Foto at 5th visit: Visit number: 6          Total Timed Minutes:  45  Total Timed Units:  3  Total Untimed Units:  0  Charges Billed/# of units:  1TE 1 MT 1 US    Subjective: Pt reported, " I have no pain, its definitely getting better"     G CODE TOOL: FOTO    Category: Self care      10/30/17   = 98%   11/29/17= 79 %    Score interpretation is as follows:     TEST SCORE  Modifier  Impairment Limitation Restriction    0%  CH  0 % impaired, limited or restricted    1-19%  CI  @ least 1% but less than 20% impaired, limited or restricted    20-39%  CJ  @ least 20%<40% impaired, limited or restricted    40-59%  CK  @ least 40%<60% impaired, limited or restricted    60-79%  CL  @ least 60% <80% impaired, limited or restricted    80-99%  CM  @ least 80%<100% impaired limited or restricted    100%  CN  100% impaired, limited or restricted             Pain upon arrival:  0/10  Pain post session: 0/10    Objective:   Sensation: intact 3.61  Scar / Wound: Slough along lower 2 cm portion of 5 cm incision. Dark granular scabbing   Edema:  7.5 cm base of middle finger       Range of Motion (in degrees):     Digits:  AROM (Ext/Flex) MCP PIP DIP TAMS               LF 5/55 10/86 5/70 191                                                          AROM Thumb exten/flex  Mp: WNL  IP:WNL     AROM:   Thumb radial ABD:WNL  Thumb patel ABD: WNL        AROM:  Thumb opposition: thumb to WNL                                                         AROM   (R)    /   (L)      Wrist extension : 55   Wrist  Flexion: 50  Radial Deviation: WNL  Ulnar Deviation: WNL   Elbow Extension:  WNL  Elbow " Flexion:      WNL  Supination:WNL  Pronation:WNL                Strength: (KAYDEN Dynamometer in lbs.), Position II  (R): TBD  (L): TBD     Pinch Strength: (Pinch Gauge in psis),                (R) /  (L)  LAT:TBD  3PT:TBD  2PT:TBD     9 hole pe seconds      Treatment included OT evaluation, the following exercises (HEP) were instructed and pt was able to demonstrate them prior to the end of the session. HEP are as follows: Pt provided detailed verbal instructions on wound care, nature of diagnosis and therapy process. Pt instructed in tendon glides and light AROM therex.       Treatment: Patient received paraffin bath to right hand(s) for 8 minutes to increase blood flow, circulation, pain management and for tissue elasticity prior to therex. Patient received ultrasound to  Palmar surface near surgical site area to increase blood flow, circulation, tissue elasticity, pain management and for wound/scar management for 8minutes @ 3.3 Mhz, Intensity .8 w/cm2 at 100% duty cycle.  Scar massage to surgical site.Thera putty exercises free style manipulation of soft yellow putty. Intrinsic muscle scissoring against putty. Gentle removal of gripper and pegs. Paper crumple x 20      Assessment:     Pt returns to follow up visit. Foto administered and indicates improved self reported functional scale. PIP joint contracture resolved following minimally PROM stretching. Pt reports no further pain. Functional use of the hand is reported to be full. Pt will attend one more visit to follow up with final HEP and possible DC.    Plan:    Goals to be met :  1) Patient to be IND with HEP and modalities for pain management by 6-8 weeks.  2)  Pt. will increase ROM 3-5 degrees to increase functional hand use for ADLs by 6-8 weeks.  3)   Pt. will increase  strength 3-5 lbs. to grasp cup by 6-8 weeks.  4)   Pt. will increase pinch 1-3 psis for buttoning by 6-8 weeks.  5)   Pt. will decrease edema .1-.3 mm to increase joint  mobility /flexibility for hand use by 6-8 weeks.   6) Pt will score FOTO score of 20% less than current score and obtain goal score.         Plan:   Patient to be treated by Occupational Therapy    1-2    times per week for  60 days   during the certification period from   10/30/17  to 12/30/17     to achieve the established goals.

## 2017-12-13 ENCOUNTER — CLINICAL SUPPORT (OUTPATIENT)
Dept: REHABILITATION | Facility: HOSPITAL | Age: 77
End: 2017-12-13
Attending: ORTHOPAEDIC SURGERY
Payer: MEDICARE

## 2017-12-13 DIAGNOSIS — M79.641 HAND PAIN, NOT ARTHRALGIA, RIGHT: ICD-10-CM

## 2017-12-13 DIAGNOSIS — M25.641 STIFFNESS OF FINGER JOINT OF RIGHT HAND: ICD-10-CM

## 2017-12-13 PROCEDURE — 97140 MANUAL THERAPY 1/> REGIONS: CPT | Mod: PO

## 2017-12-13 PROCEDURE — 97110 THERAPEUTIC EXERCISES: CPT | Mod: PO

## 2017-12-13 NOTE — PROGRESS NOTES
"Occupational Therapy Progress Note    Patient: Ayaka Ellison  Date of Evaluation: 12/13/2017  MRN: [id number]  Diagnosis:   1. Hand pain, not arthralgia, right     2. Stiffness of finger joint of right hand       Physician: Atilio Obando III*    Total Treatment time: 60  Group Time: 0    Time in:11:00 am  Time out: 12:00 am       Visit Number 20  Foto at 5th visit: Visit number: 9        Total Timed Minutes:  60      Subjective: Pt reported, " I have no pain, its definitely getting better"     G CODE TOOL: FOTO    Category: Self care      10/30/17   = 98%   11/29/17= 79 %    12/13/17= 2 %    Score interpretation is as follows:     TEST SCORE  Modifier  Impairment Limitation Restriction    0%  CH  0 % impaired, limited or restricted    1-19%  CI  @ least 1% but less than 20% impaired, limited or restricted    20-39%  CJ  @ least 20%<40% impaired, limited or restricted    40-59%  CK  @ least 40%<60% impaired, limited or restricted    60-79%  CL  @ least 60% <80% impaired, limited or restricted    80-99%  CM  @ least 80%<100% impaired limited or restricted    100%  CN  100% impaired, limited or restricted             Pain upon arrival:  0/10  Pain post session: 0/10    Objective:   Sensation: intact 3.61  Scar / Wound: Slough along lower 2 cm portion of 5 cm incision. Dark granular scabbing   Edema:  7.5 cm base of middle finger       Range of Motion (in degrees):         Digits: 10/30/17  AROM (Ext/Flex) MCP PIP DIP TAMS               LF 5/55 10/86 5/70 191                                12/13/17 taken post stretching   digits:   AROM (Ext/Flex) MCP PIP DIP TAMS               LF 0/98 -8/95 -5/71 251                                 Edema   B/L 7 cm  6 lbs lateral pinch                       AROM Thumb exten/flex  Mp: WNL  IP:WNL     AROM:   Thumb radial ABD:WNL  Thumb patel ABD: WNL        AROM:  Thumb opposition: thumb to WNL                                                         AROM   (R)    /   (L)      " Wrist extension : 75  Wrist  Flexion: 65  Radial Deviation: WNL  Ulnar Deviation: WNL   Elbow Extension:  WNL  Elbow Flexion:      WNL  Supination:WNL  Pronation:WNL                Strength: (KAYDEN Dynamometer in lbs.), Position II 17  (R): 39  (L): 40          9 hole pe seconds 17     Treatment included OT evaluation, the following exercises (HEP) were instructed and pt was able to demonstrate them prior to the end of the session. HEP are as follows: Pt provided detailed verbal instructions on wound care, nature of diagnosis and therapy process. Pt instructed in tendon glides and light AROM therex.       Treatment: Patient received paraffin bath to right hand(s) for 8 minutes to increase blood flow, circulation, pain management and for tissue elasticity prior to therex. Patient received ultrasound to  Palmar surface near surgical site area to increase blood flow, circulation, tissue elasticity, pain management and for wound/scar management for 8minutes @ 3.3 Mhz, Intensity .8 w/cm2 at 100% duty cycle.  Scar massage to surgical site.Thera putty exercises free style manipulation of soft yellow putty. Intrinsic muscle scissoring against putty. Gentle removal of gripper and pegs. Paper crumple x 20      Assessment:     Pt returns for her DC appointment. She has attended 9 therapy appointments with no missed visits. 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 Pt is able to resolve contracture with mild PROM stretching with which she is independent.  strength is within functional norms and pt expressed satisfaction with her strength and functional status. Pt in agreement to DC at this time. Therapy services are no longer recommended at this time.   Plan:    Goals to be met :  1) Patient to be IND with HEP and modalities for pain management by 6-8 weeks. Goal met  2)  Pt. will increase ROM 3-5 degrees to increase functional hand use for ADLs by 6-8  weeks. Goal met  3)   Pt. will increase  strength 3-5 lbs. to grasp cup by 6-8 weeks. Goal met  4)   Pt. will increase pinch 1-3 psis for buttoning by 6-8 weeks. Goal met  5)   Pt. will decrease edema .1-.3 mm to increase joint mobility /flexibility for hand use by 6-8 weeks. Goal met  6) Pt will score FOTO score of 20% less than current score and obtain goal score. Goal met        Plan:   Pt is DC at this time

## 2018-03-17 ENCOUNTER — OFFICE VISIT (OUTPATIENT)
Dept: URGENT CARE | Facility: CLINIC | Age: 78
End: 2018-03-17
Payer: MEDICARE

## 2018-03-17 VITALS
DIASTOLIC BLOOD PRESSURE: 72 MMHG | RESPIRATION RATE: 18 BRPM | HEIGHT: 62 IN | HEART RATE: 68 BPM | OXYGEN SATURATION: 96 % | SYSTOLIC BLOOD PRESSURE: 130 MMHG | BODY MASS INDEX: 28.71 KG/M2 | TEMPERATURE: 98 F | WEIGHT: 156 LBS

## 2018-03-17 DIAGNOSIS — J01.90 ACUTE BACTERIAL SINUSITIS: ICD-10-CM

## 2018-03-17 DIAGNOSIS — B96.89 ACUTE BACTERIAL SINUSITIS: ICD-10-CM

## 2018-03-17 DIAGNOSIS — J20.9 ACUTE PURULENT BRONCHITIS: Primary | ICD-10-CM

## 2018-03-17 PROCEDURE — 99213 OFFICE O/P EST LOW 20 MIN: CPT | Mod: 25,S$GLB,, | Performed by: INTERNAL MEDICINE

## 2018-03-17 PROCEDURE — 96372 THER/PROPH/DIAG INJ SC/IM: CPT | Mod: S$GLB,,, | Performed by: INTERNAL MEDICINE

## 2018-03-17 RX ORDER — BETAMETHASONE SODIUM PHOSPHATE AND BETAMETHASONE ACETATE 3; 3 MG/ML; MG/ML
9 INJECTION, SUSPENSION INTRA-ARTICULAR; INTRALESIONAL; INTRAMUSCULAR; SOFT TISSUE ONCE
Status: COMPLETED | OUTPATIENT
Start: 2018-03-17 | End: 2018-03-17

## 2018-03-17 RX ORDER — IBUPROFEN 600 MG/1
600 TABLET ORAL EVERY 6 HOURS PRN
COMMUNITY

## 2018-03-17 RX ORDER — AZITHROMYCIN 250 MG/1
TABLET, FILM COATED ORAL
Qty: 6 TABLET | Refills: 0 | Status: SHIPPED | OUTPATIENT
Start: 2018-03-17 | End: 2019-09-05

## 2018-03-17 RX ADMIN — BETAMETHASONE SODIUM PHOSPHATE AND BETAMETHASONE ACETATE 9 MG: 3; 3 INJECTION, SUSPENSION INTRA-ARTICULAR; INTRALESIONAL; INTRAMUSCULAR; SOFT TISSUE at 12:03

## 2018-03-17 NOTE — PROGRESS NOTES
"Subjective:       Patient ID: Ayaka Ellison is a 77 y.o. female.    Vitals:  height is 5' 2" (1.575 m) and weight is 70.8 kg (156 lb). Her oral temperature is 97.7 °F (36.5 °C). Her blood pressure is 130/72 and her pulse is 68. Her respiration is 18 and oxygen saturation is 96%.     Chief Complaint: Cough    Cough   This is a new problem. The current episode started in the past 7 days (3/15/18). The problem has been gradually worsening. The problem occurs every few minutes. The cough is productive of sputum. Associated symptoms include postnasal drip. Pertinent negatives include no chest pain, chills, ear pain, eye redness, fever, headaches, myalgias, sore throat, shortness of breath or wheezing. The symptoms are aggravated by lying down. She has tried nothing for the symptoms. The treatment provided no relief. There is no history of asthma, bronchiectasis, bronchitis, COPD, emphysema, environmental allergies or pneumonia.     Review of Systems   Constitution: Negative for chills, fever and malaise/fatigue.   HENT: Positive for congestion, hoarse voice and postnasal drip. Negative for ear pain and sore throat.    Eyes: Negative for discharge and redness.   Cardiovascular: Negative for chest pain, dyspnea on exertion and leg swelling.   Respiratory: Positive for cough and sputum production. Negative for shortness of breath and wheezing.    Musculoskeletal: Negative for myalgias.   Gastrointestinal: Negative for abdominal pain and nausea.   Neurological: Negative for headaches.   Allergic/Immunologic: Negative for environmental allergies.       Objective:      Physical Exam   Constitutional: She appears well-developed and well-nourished.   HENT:   Head: Normocephalic and atraumatic.   Nose: Mucosal edema (purulent drainage) present.   Eyes: Conjunctivae and EOM are normal. Pupils are equal, round, and reactive to light.   Neck: Normal range of motion. Neck supple.   Cardiovascular: Normal rate and regular rhythm. "    Pulmonary/Chest: Effort normal.   upper airway congestion with scant exp wheeze   Nursing note and vitals reviewed.      Assessment:       1. Acute purulent bronchitis    2. Acute bacterial sinusitis        Plan:         Acute purulent bronchitis  -     betamethasone acetate-betamethasone sodium phosphate injection 9 mg; Inject 1.5 mLs (9 mg total) into the muscle once.  -     azithromycin (ZITHROMAX Z-DANILO) 250 MG tablet; Take 2 tablets (500 mg) on  Day 1,  followed by 1 tablet (250 mg) once daily on Days 2 through 5.  Dispense: 6 tablet; Refill: 0    Acute bacterial sinusitis  -     betamethasone acetate-betamethasone sodium phosphate injection 9 mg; Inject 1.5 mLs (9 mg total) into the muscle once.  -     azithromycin (ZITHROMAX Z-DANILO) 250 MG tablet; Take 2 tablets (500 mg) on  Day 1,  followed by 1 tablet (250 mg) once daily on Days 2 through 5.  Dispense: 6 tablet; Refill: 0

## 2018-05-13 ENCOUNTER — OFFICE VISIT (OUTPATIENT)
Dept: URGENT CARE | Facility: CLINIC | Age: 78
End: 2018-05-13
Payer: MEDICARE

## 2018-05-13 VITALS
DIASTOLIC BLOOD PRESSURE: 92 MMHG | SYSTOLIC BLOOD PRESSURE: 160 MMHG | BODY MASS INDEX: 28.71 KG/M2 | WEIGHT: 156 LBS | HEART RATE: 84 BPM | RESPIRATION RATE: 16 BRPM | OXYGEN SATURATION: 96 % | TEMPERATURE: 98 F | HEIGHT: 62 IN

## 2018-05-13 DIAGNOSIS — J02.9 PHARYNGITIS, UNSPECIFIED ETIOLOGY: Primary | ICD-10-CM

## 2018-05-13 DIAGNOSIS — J02.9 SORE THROAT: ICD-10-CM

## 2018-05-13 LAB
CTP QC/QA: YES
S PYO RRNA THROAT QL PROBE: NEGATIVE

## 2018-05-13 PROCEDURE — 99214 OFFICE O/P EST MOD 30 MIN: CPT | Mod: S$GLB,,, | Performed by: FAMILY MEDICINE

## 2018-05-13 PROCEDURE — 87880 STREP A ASSAY W/OPTIC: CPT | Mod: QW,S$GLB,, | Performed by: FAMILY MEDICINE

## 2018-05-13 RX ORDER — AZITHROMYCIN 250 MG/1
TABLET, FILM COATED ORAL
Qty: 6 TABLET | Refills: 0 | Status: SHIPPED | OUTPATIENT
Start: 2018-05-13 | End: 2019-09-05

## 2018-05-13 RX ORDER — SERTRALINE HYDROCHLORIDE 100 MG/1
100 TABLET, FILM COATED ORAL DAILY
COMMUNITY
End: 2023-03-13

## 2018-05-13 NOTE — PROGRESS NOTES
"Subjective:       Patient ID: Ayaka Ellison is a 77 y.o. female.    Vitals:  height is 5' 2" (1.575 m) and weight is 70.8 kg (156 lb). Her temperature is 97.9 °F (36.6 °C). Her blood pressure is 160/92 (abnormal) and her pulse is 84. Her respiration is 16 and oxygen saturation is 96%.     Chief Complaint: Sore Throat    Pt states for the couple of days she has had a sore throat and congestion.pt states she has had a "sneezing" pain in her chest starting this morning.pt also states she sleeps with a cpap machine.      Sore Throat    This is a new problem. The current episode started in the past 7 days. The problem has been unchanged. Associated symptoms include congestion and coughing. Pertinent negatives include no abdominal pain, ear pain, headaches, hoarse voice or shortness of breath. She has tried NSAIDs for the symptoms. The treatment provided mild relief.     Review of Systems   Constitution: Negative for chills, fever and malaise/fatigue.   HENT: Positive for congestion and sore throat. Negative for ear pain and hoarse voice.    Eyes: Negative for discharge and redness.   Cardiovascular: Positive for chest pain. Negative for dyspnea on exertion and leg swelling.   Respiratory: Positive for cough and sputum production. Negative for shortness of breath and wheezing.    Musculoskeletal: Positive for stiffness. Negative for myalgias.   Gastrointestinal: Negative for abdominal pain and nausea.   Neurological: Negative for headaches.       Objective:      Physical Exam   Constitutional: She is oriented to person, place, and time. She appears well-developed and well-nourished.   HENT:   Head: Normocephalic and atraumatic.   Right Ear: External ear normal.   Left Ear: External ear normal.   Erythematous pharynx, no exudate, no lesion, uvula midline.   Eyes: EOM are normal. Pupils are equal, round, and reactive to light.   Neck: Normal range of motion. Neck supple. No JVD present. No tracheal deviation present. " No thyromegaly present.   Cardiovascular: Normal rate, regular rhythm and normal heart sounds.  Exam reveals no gallop and no friction rub.    No murmur heard.  Pulmonary/Chest: Breath sounds normal. No respiratory distress. She has no wheezes. She has no rales. She exhibits no tenderness.   Abdominal: Soft. Bowel sounds are normal. She exhibits no distension and no mass. There is no tenderness. There is no rebound and no guarding. No hernia.   Musculoskeletal: Normal range of motion. She exhibits no edema, tenderness or deformity.   Lymphadenopathy:     She has no cervical adenopathy.   Neurological: She is alert and oriented to person, place, and time. She displays normal reflexes. No cranial nerve deficit. She exhibits normal muscle tone. Coordination normal.   Skin: Skin is warm. Capillary refill takes less than 2 seconds. No rash noted. No erythema. No pallor.   Psychiatric: She has a normal mood and affect. Her behavior is normal. Judgment and thought content normal.   Vitals reviewed.      Assessment:       1. Pharyngitis, unspecified etiology    2. Sore throat        Plan:         Pharyngitis, unspecified etiology  -     azithromycin (Z-DANILO) 250 MG tablet; Take 2 tablets by mouth x 1 for day 1 Then take 1 tablet by mouth daily for day 2 - 5  Dispense: 6 tablet; Refill: 0    Sore throat  -     POCT rapid strep A      Follow up with your doctor in a few days as needed.  Return to the urgent care or go to the ER if symptoms get worse.    Davide Dent MD

## 2018-05-13 NOTE — PATIENT INSTRUCTIONS
When You Have a Sore Throat    A sore throat can be painful. There are many reasons why you may have a sore throat. Your healthcare provider will work with you to find the cause of your sore throat. He or she will also find the best treatment for you.  What causes a sore throat?  Sore throats can be caused or worsened by:  · Cold or flu viruses  · Bacteria  · Irritants such as tobacco smoke or air pollution  · Acid reflux  A healthy throat  The tonsils are on the sides of the throat near the base of the tongue. They collect viruses and bacteria and help fight infection. The throat (pharynx) is the passage for air. Mucus from the nasal cavity also moves down the passage.  An inflamed throat  The tonsils and pharynx can become inflamed due to a cold or flu virus. Postnasal drip (excess mucus draining from the nasal cavity) can irritate the throat. It can also make the throat or tonsils more likely to be infected by bacteria. Severe, untreated tonsillitis in children or adults can cause a pocket of pus (abscess) to form near the tonsil.  Your evaluation  A medical evaluation can help find the cause of your sore throat. It can also help your healthcare provider choose the best treatment for you. The evaluation may include a health history, physical exam, and diagnostic tests.  Health history  Your healthcare provider may ask you the following:  · How long has the sore throat lasted and how have you been treating it?  · Do you have any other symptoms, such as body aches, fever, or cough?  · Does your sore throat recur? If so, how often? How many days of school or work have you missed because of a sore throat?  · Do you have trouble eating or swallowing?  · Have you been told that you snore or have other sleep problems?  · Do you have bad breath?  · Do you cough up bad-tasting mucus?  Physical exam  During the exam, your healthcare provider checks your ears, nose, and throat for problems. He or she also checks for  "swelling in the neck, and may listen to your chest.  Possible tests  Other tests your healthcare provider may perform include:  · A throat swab to check for bacteria such as streptococcus (the bacteria that causes strep throat)  · A blood test to check for mononucleosis (a viral infection)  · A chest X-ray to rule out pneumonia, especially if you have a cough  Treating a sore throat  Treatment depends on many factors. What is the likely cause? Is the problem recent? Does it keep coming back? In many cases, the best thing to do is to treat the symptoms, rest, and let the problem heal itself. Antibiotics may help clear up some bacterial infections. For cases of severe or recurring tonsillitis, the tonsils may need to be removed.  Relieving your symptoms  · Dont smoke, and avoid secondhand smoke.  · For children, try throat sprays or Popsicles. Adults and older children may try lozenges.  · Drink warm liquids to soothe the throat and help thin mucus. Avoid alcohol, spicy foods, and acidic drinks such as orange juice. These can irritate the throat.  · Gargle with warm saltwater (1 teaspoon of salt to 8 ounces of warm water).  · Use a humidifier to keep air moist and relieve throat dryness.  · Try over-the-counter pain relievers such as acetaminophen or ibuprofen. Use as directed, and dont exceed the recommended dose. Dont give aspirin to children.   Are antibiotics needed?  If your sore throat is due to a bacterial infection, antibiotics may speed healing and prevent complications. Although group A streptococcus ("strep throat" or GAS) is the major treatable infection for a sore throat, GAS causes only 5% to 15% of sore throats in adults who seek medical care. Most sore throats are caused by cold or flu viruses. And antibiotics dont treat viral illness. In fact, using antibiotics when theyre not needed may produce bacteria that are harder to kill. Your healthcare provider will prescribe antibiotics only if he or " she thinks they are likely to help.  If antibiotics are prescribed  Take the medicine exactly as directed. Be sure to finish your prescription even if youre feeling better. And be sure to ask your healthcare provider or pharmacist what side effects are common and what to do about them.  Is surgery needed?  In some cases, tonsils need to be removed. This is often done as outpatient (same-day) surgery. Your healthcare provider may advise removing the tonsils in cases of:  · Several severe bouts of tonsillitis in a year. Severe episodes include those that lead to missed days of school or work, or that need to be treated with antibiotics.  · Tonsillitis that causes breathing problems during sleep  · Tonsillitis caused by food particles collecting in pouches in the tonsils (cryptic tonsillitis)  Call your healthcare provider if any of the following occur:  · Symptoms worsen, or new symptoms develop.  · Swollen tonsils make breathing difficult.  · The pain is severe enough to keep you from drinking liquids.  · A skin rash, hives, or wheezing develops. Any of these could signal an allergic reaction to antibiotics.  · Symptoms dont improve within a week.  · Symptoms dont improve within 2 to 3 days of starting antibiotics.   Date Last Reviewed: 10/1/2016  © 0123-0293 [employer]. [address] [place] [address], [place] [address]. All rights reserved. This information is not intended as a substitute for professional medical care. Always follow your healthcare professional's instructions.    Follow up with your doctor in a few days as needed.  Return to the urgent care or go to the ER if symptoms get worse.    Davide Dent MD

## 2019-09-05 ENCOUNTER — OFFICE VISIT (OUTPATIENT)
Dept: URGENT CARE | Facility: CLINIC | Age: 79
End: 2019-09-05
Payer: MEDICARE

## 2019-09-05 VITALS
DIASTOLIC BLOOD PRESSURE: 70 MMHG | TEMPERATURE: 98 F | HEIGHT: 62 IN | RESPIRATION RATE: 18 BRPM | WEIGHT: 150 LBS | HEART RATE: 61 BPM | SYSTOLIC BLOOD PRESSURE: 163 MMHG | BODY MASS INDEX: 27.6 KG/M2 | OXYGEN SATURATION: 97 %

## 2019-09-05 DIAGNOSIS — J02.9 SORE THROAT: ICD-10-CM

## 2019-09-05 DIAGNOSIS — R52 GENERALIZED BODY ACHES: Primary | ICD-10-CM

## 2019-09-05 DIAGNOSIS — J20.9 ACUTE BRONCHITIS, UNSPECIFIED ORGANISM: ICD-10-CM

## 2019-09-05 LAB
CTP QC/QA: YES
CTP QC/QA: YES
FLUAV AG NPH QL: NEGATIVE
FLUBV AG NPH QL: NEGATIVE
S PYO RRNA THROAT QL PROBE: NEGATIVE

## 2019-09-05 PROCEDURE — 87880 POCT RAPID STREP A: ICD-10-PCS | Mod: QW,S$GLB,, | Performed by: NURSE PRACTITIONER

## 2019-09-05 PROCEDURE — 71046 X-RAY EXAM CHEST 2 VIEWS: CPT | Mod: FY,S$GLB,, | Performed by: RADIOLOGY

## 2019-09-05 PROCEDURE — 87804 INFLUENZA ASSAY W/OPTIC: CPT | Mod: QW,S$GLB,, | Performed by: NURSE PRACTITIONER

## 2019-09-05 PROCEDURE — 71046 XR CHEST PA AND LATERAL: ICD-10-PCS | Mod: FY,S$GLB,, | Performed by: RADIOLOGY

## 2019-09-05 PROCEDURE — 99214 OFFICE O/P EST MOD 30 MIN: CPT | Mod: S$GLB,,, | Performed by: NURSE PRACTITIONER

## 2019-09-05 PROCEDURE — 87880 STREP A ASSAY W/OPTIC: CPT | Mod: QW,S$GLB,, | Performed by: NURSE PRACTITIONER

## 2019-09-05 PROCEDURE — 99214 PR OFFICE/OUTPT VISIT, EST, LEVL IV, 30-39 MIN: ICD-10-PCS | Mod: S$GLB,,, | Performed by: NURSE PRACTITIONER

## 2019-09-05 PROCEDURE — 87804 POCT INFLUENZA A/B: ICD-10-PCS | Mod: QW,S$GLB,, | Performed by: NURSE PRACTITIONER

## 2019-09-05 RX ORDER — ATORVASTATIN CALCIUM 20 MG/1
20 TABLET, FILM COATED ORAL DAILY
Status: ON HOLD | COMMUNITY
End: 2020-11-02 | Stop reason: HOSPADM

## 2019-09-05 RX ORDER — ALBUTEROL SULFATE 90 UG/1
2 AEROSOL, METERED RESPIRATORY (INHALATION) EVERY 6 HOURS PRN
Qty: 6.7 G | Refills: 0 | Status: SHIPPED | OUTPATIENT
Start: 2019-09-05 | End: 2023-03-13

## 2019-09-05 RX ORDER — BENZONATATE 100 MG/1
100 CAPSULE ORAL 3 TIMES DAILY PRN
Qty: 30 CAPSULE | Refills: 0 | Status: SHIPPED | OUTPATIENT
Start: 2019-09-05 | End: 2019-09-15

## 2019-09-05 RX ORDER — AZITHROMYCIN 250 MG/1
TABLET, FILM COATED ORAL
Qty: 6 TABLET | Refills: 0 | Status: SHIPPED | OUTPATIENT
Start: 2019-09-05 | End: 2019-09-10

## 2019-09-05 NOTE — PATIENT INSTRUCTIONS
Bronchitis  If your condition worsens or fails to improve we recommend that you receive another evaluation at the ER immediately or contact your PCP to discuss your concerns or return here. You must understand that you've received an urgent care treatment only and that you may be released before all your medical problems are known or treated. You the patient will arrange for follouwp care as instructed. .  Take full course of antibiotics as prescribes  Rest and fluids are important  You were prescribed antibiotics, please take them to completion.  Take inhaler as prescribed and needed for wheezing  Please follow up with your primary care doctor or specialist in the next 48-72hrs as needed and if no improvement  If you  smoke, please stop smoking.

## 2019-09-05 NOTE — PROGRESS NOTES
"Subjective:       Patient ID: Ayaka Ellison is a 79 y.o. female.    Vitals:  height is 5' 2" (1.575 m) and weight is 68 kg (150 lb). Her temperature is 97.7 °F (36.5 °C). Her blood pressure is 163/70 (abnormal) and her pulse is 61. Her respiration is 18 and oxygen saturation is 97%.     Chief Complaint: URI    Started a week ago with scratchy throat, fatigued, feeling bloated and muscle aches. Pt has been staying with her grandson who has strep and pneumonia. Denies fever, chills, chest pain. Does state that she gets a little short of breath " sometimes". Reports that she uses electronic cigarettes. Has taken Tylenol with no relief.    URI    This is a new problem. The current episode started in the past 7 days. The problem has been gradually worsening. There has been no fever. Associated symptoms include a sore throat. Pertinent negatives include no congestion, coughing, ear pain, nausea, rash, sinus pain, vomiting or wheezing. Associated symptoms comments: Runny nose. She has tried acetaminophen for the symptoms. The treatment provided no relief.       Constitution: Positive for fatigue. Negative for chills, sweating and fever.   HENT: Positive for sore throat. Negative for ear pain, congestion, sinus pain, sinus pressure and voice change.         Runny nose   Neck: Negative for painful lymph nodes.   Eyes: Negative for eye redness.   Respiratory: Negative for chest tightness, cough, sputum production, bloody sputum, COPD, shortness of breath, stridor, wheezing and asthma.    Gastrointestinal: Negative for nausea and vomiting.        Bloating   Musculoskeletal: Positive for muscle ache.   Skin: Negative for rash.   Allergic/Immunologic: Negative for seasonal allergies and asthma.   Hematologic/Lymphatic: Negative for swollen lymph nodes.       Objective:      Physical Exam   Constitutional: She is oriented to person, place, and time. She appears well-developed and well-nourished. She is cooperative.  " Non-toxic appearance. She does not have a sickly appearance. She does not appear ill. No distress.   HENT:   Head: Normocephalic.   Right Ear: Hearing, tympanic membrane, external ear and ear canal normal.   Left Ear: Hearing, tympanic membrane, external ear and ear canal normal.   Nose: Nose normal. No mucosal edema or rhinorrhea.   Mouth/Throat: Uvula is midline, oropharynx is clear and moist and mucous membranes are normal.   Eyes: Pupils are equal, round, and reactive to light.   Neck: Trachea normal, normal range of motion and full passive range of motion without pain.   Cardiovascular: Normal rate, regular rhythm, normal heart sounds and normal pulses.   Pulmonary/Chest: Effort normal and breath sounds normal. No accessory muscle usage or stridor. No apnea, no tachypnea and no bradypnea. No respiratory distress. She has no decreased breath sounds. She has no wheezes. She has no rhonchi. She has no rales.   Abdominal: Soft. Normal appearance. There is no tenderness.   Musculoskeletal: Normal range of motion.   Neurological: She is alert and oriented to person, place, and time.   Skin: Skin is warm and dry. Capillary refill takes less than 2 seconds.   Psychiatric: She has a normal mood and affect. Her behavior is normal.   Nursing note and vitals reviewed.      Results for orders placed or performed in visit on 09/05/19   POCT Influenza A/B   Result Value Ref Range    Rapid Influenza A Ag Negative Negative    Rapid Influenza B Ag Negative Negative     Acceptable Yes    POCT rapid strep A   Result Value Ref Range    Rapid Strep A Screen Negative Negative     Acceptable Yes    Xr Chest Pa And Lateral    Result Date: 9/5/2019  EXAMINATION: XR CHEST PA AND LATERAL CLINICAL HISTORY: Pain, unspecified TECHNIQUE: PA and lateral views of the chest were performed. COMPARISON: None FINDINGS: The lungs are clear, with normal appearance of pulmonary vasculature and no pleural effusion or  pneumothorax. The cardiac silhouette is normal in size. The hilar and mediastinal contours are unremarkable. Bones are intact.     No acute abnormality. Electronically signed by: Sanchez Nunez MD Date:    09/05/2019 Time:    11:37  Assessment:       1. Generalized body aches    2. Sore throat    3. Acute bronchitis, unspecified organism        Plan:         Generalized body aches  -     POCT Influenza A/B  -     XR CHEST PA AND LATERAL; Future; Expected date: 09/05/2019    Sore throat  -     POCT rapid strep A    Acute bronchitis, unspecified organism  -     azithromycin (Z-DANILO) 250 MG tablet; Take 2 tablets by mouth on day 1; Take 1 tablet by mouth on days 2-5  Dispense: 6 tablet; Refill: 0  -     albuterol (PROVENTIL HFA) 90 mcg/actuation inhaler; Inhale 2 puffs into the lungs every 6 (six) hours as needed for Wheezing. Rescue  Dispense: 6.7 g; Refill: 0  -     benzonatate (TESSALON) 100 MG capsule; Take 1 capsule (100 mg total) by mouth 3 (three) times daily as needed for Cough.  Dispense: 30 capsule; Refill: 0      Patient Instructions   Bronchitis  If your condition worsens or fails to improve we recommend that you receive another evaluation at the ER immediately or contact your PCP to discuss your concerns or return here. You must understand that you've received an urgent care treatment only and that you may be released before all your medical problems are known or treated. You the patient will arrange for follouwp care as instructed. .  Take full course of antibiotics as prescribes  Rest and fluids are important  You were prescribed antibiotics, please take them to completion.  Take inhaler as prescribed and needed for wheezing  Please follow up with your primary care doctor or specialist in the next 48-72hrs as needed and if no improvement  If you  smoke, please stop smoking.

## 2019-11-06 ENCOUNTER — OFFICE VISIT (OUTPATIENT)
Dept: URGENT CARE | Facility: CLINIC | Age: 79
End: 2019-11-06
Payer: MEDICARE

## 2019-11-06 VITALS
HEIGHT: 62 IN | RESPIRATION RATE: 18 BRPM | SYSTOLIC BLOOD PRESSURE: 138 MMHG | WEIGHT: 150 LBS | TEMPERATURE: 99 F | DIASTOLIC BLOOD PRESSURE: 65 MMHG | OXYGEN SATURATION: 96 % | HEART RATE: 56 BPM | BODY MASS INDEX: 27.6 KG/M2

## 2019-11-06 DIAGNOSIS — M62.830 MUSCLE SPASM OF BACK: Primary | ICD-10-CM

## 2019-11-06 PROCEDURE — 99214 PR OFFICE/OUTPT VISIT, EST, LEVL IV, 30-39 MIN: ICD-10-PCS | Mod: S$GLB,,, | Performed by: PHYSICIAN ASSISTANT

## 2019-11-06 PROCEDURE — 99214 OFFICE O/P EST MOD 30 MIN: CPT | Mod: S$GLB,,, | Performed by: PHYSICIAN ASSISTANT

## 2019-11-06 RX ORDER — NAPROXEN 500 MG/1
500 TABLET ORAL 2 TIMES DAILY WITH MEALS
Qty: 20 TABLET | Refills: 0 | Status: SHIPPED | OUTPATIENT
Start: 2019-11-06 | End: 2019-11-16

## 2019-11-06 RX ORDER — METHOCARBAMOL 500 MG/1
500 TABLET, FILM COATED ORAL 4 TIMES DAILY
Qty: 28 TABLET | Refills: 0 | Status: SHIPPED | OUTPATIENT
Start: 2019-11-06 | End: 2019-11-13

## 2019-11-06 NOTE — PATIENT INSTRUCTIONS
Muscle Spasm  A muscle spasm is a sudden tightening of the muscle you cant control. This may be caused by strain, overworking the muscle, or injury. It can also be caused by dehydration, electrolyte imbalance, diabetes, alcohol use, and certain medicines. If it goes on long enough the muscle spasm causes pain. Common areas for muscle spasm are the legs, neck, and back.  Home care  · Heat, massage, and stretching will help relax muscle spasm.  · When the spasm is in your arm or leg, stretch the muscle passively. To do this, have someone bend or straighten the joint above or below the muscle until you feel the stretch on the sore muscle. You can stretch the muscle actively by moving the affected body part. This will stretch the muscle that is in spasm. For example, if the spasm is in your calf, bend the ankle so your toes point upward toward your knee. This will stretch your calf muscle.  · You may use over-the-counter pain medicine to control pain, unless another medicine was prescribed. If you have chronic liver or kidney disease or ever had a stomach ulcer or GI bleeding, talk with your healthcare provider before using these medicines.  Follow-up care  Follow up with your healthcare provider, or as advised.    When to seek medical advice  Call your healthcare provider right away if any of the following occur:  · Fingers or toes become swollen, cold, blue, numb, or tingly  · You develop weakness in the affected arm or leg  · Pain increases and is not controlled by the above measures  Date Last Reviewed: 11/21/2015 © 2000-2017 [employer]. [address] [place] [address], [place] [address]. All rights reserved. This information is not intended as a substitute for professional medical care. Always follow your healthcare professional's instructions.      Please follow up with your Primary care provider within 2-5 days if your signs and symptoms have not resolved or worsen.     If your condition worsens or  fails to improve we recommend that you receive another evaluation at the emergency room immediately or contact your primary medical clinic to discuss your concerns.   You must understand that you have received an Urgent Care treatment only and that you may be released before all of your medical problems are known or treated. You, the patient, will arrange for follow up care as instructed.     RED FLAGS/WARNING SYMPTOMS DISCUSSED WITH PATIENT THAT WOULD WARRANT EMERGENT MEDICAL ATTENTION. PATIENT VERBALIZED UNDERSTANDING.

## 2019-11-06 NOTE — PROGRESS NOTES
"Subjective:       Patient ID: Ayaka Ellison is a 79 y.o. female.    Vitals:  height is 5' 2" (1.575 m) and weight is 68 kg (150 lb). Her oral temperature is 99.1 °F (37.3 °C). Her blood pressure is 138/65 and her pulse is 56 (abnormal). Her respiration is 18 and oxygen saturation is 96%.     Chief Complaint: Fall    Patient had a fall at home 8 days ago.  She reports having good range of motion however her back predominantly on her left side is causing her pain. She denies any dysuria or frequency.  Afebrile.  Denies any chest pain, shortness of breath, hemoptysis.  Denies any leg pain or swelling. She does report mild bruising on her left lower back.  She has tried ibuprofen at home every 4 hr for symptom relief and has helped moderately.    Fall   The accident occurred more than 1 week ago. The fall occurred while walking (pt tripped over luggage inside of the home). She fell from a height of 1 to 2 ft. Point of impact: Upper back, lower back , and rib on the left side. Pain location: Left side, lower back and upper back. The pain is at a severity of 10/10. The pain is severe. The symptoms are aggravated by pressure on injury, movement, rotation and use of injured limb. Pertinent negatives include no abdominal pain, bowel incontinence, fever, headaches, hearing loss, hematuria, loss of consciousness, nausea, numbness, tingling, visual change or vomiting. She has tried heat and rest for the symptoms. The treatment provided mild relief.       Constitution: Negative for fatigue and fever.   HENT: Negative for facial swelling and facial trauma.    Neck: Negative for neck stiffness.   Cardiovascular: Negative for chest trauma.   Eyes: Negative for eye trauma, double vision and blurred vision.   Gastrointestinal: Negative for abdominal trauma, abdominal pain, nausea, vomiting, rectal bleeding and bowel incontinence.   Genitourinary: Negative for hematuria, missed menses, genital trauma and pelvic pain. "   Musculoskeletal: Positive for pain, trauma and abnormal ROM of joint. Negative for joint swelling.        Left side, lower back , and upper back   Skin: Negative for color change, wound, abrasion, laceration and bruising.   Neurological: Negative for dizziness, history of vertigo, light-headedness, coordination disturbances, headaches, altered mental status, loss of consciousness and numbness.   Hematologic/Lymphatic: Negative for history of bleeding disorder.   Psychiatric/Behavioral: Negative for altered mental status.       Objective:      Physical Exam   Musculoskeletal:        Cervical back: She exhibits normal range of motion, no tenderness, no bony tenderness, no swelling, no edema, no deformity, no laceration, no pain, no spasm and normal pulse.        Thoracic back: She exhibits decreased range of motion, tenderness, pain and spasm. She exhibits no bony tenderness, no swelling, no edema, no deformity, no laceration and normal pulse.        Lumbar back: She exhibits normal range of motion, no tenderness, no bony tenderness, no swelling, no edema, no deformity, no laceration, no pain, no spasm and normal pulse.        Back:         Arms:  NEG ST LEG RAISE  FULL ROM B LE WITH 5/5 STRENGTH  2+DTR PATELLA AND ACHILLES  NVIT DISTALLY WITH SILT AND 2+BCR  ABLE TO AMBULATE WITH SMOOTH RHYTHMIC GAIT           Assessment:       1. Muscle spasm of back        Plan:       Patient deferred x-ray at this time.  She felt like if there was a fracture there is no treatment other than rest.  Told to take naproxen up to 2 times daily as needed for pain and may take Robaxin for spasm at night.  Discussed that this is sedating so should be taken at bedtime and do not drive for operate machinery while taking this medicine.  If symptoms do not resolve should follow up with primary care provider within a week.  Muscle spasm of back  -     naproxen (NAPROSYN) 500 MG tablet; Take 1 tablet (500 mg total) by mouth 2 (two) times  daily with meals. for 10 days  Dispense: 20 tablet; Refill: 0  -     methocarbamol (ROBAXIN) 500 MG Tab; Take 1 tablet (500 mg total) by mouth 4 (four) times daily. As needed for muscle spasm. May cause drowsiness for 7 days  Dispense: 28 tablet; Refill: 0      Patient Instructions     Muscle Spasm  A muscle spasm is a sudden tightening of the muscle you cant control. This may be caused by strain, overworking the muscle, or injury. It can also be caused by dehydration, electrolyte imbalance, diabetes, alcohol use, and certain medicines. If it goes on long enough the muscle spasm causes pain. Common areas for muscle spasm are the legs, neck, and back.  Home care  · Heat, massage, and stretching will help relax muscle spasm.  · When the spasm is in your arm or leg, stretch the muscle passively. To do this, have someone bend or straighten the joint above or below the muscle until you feel the stretch on the sore muscle. You can stretch the muscle actively by moving the affected body part. This will stretch the muscle that is in spasm. For example, if the spasm is in your calf, bend the ankle so your toes point upward toward your knee. This will stretch your calf muscle.  · You may use over-the-counter pain medicine to control pain, unless another medicine was prescribed. If you have chronic liver or kidney disease or ever had a stomach ulcer or GI bleeding, talk with your healthcare provider before using these medicines.  Follow-up care  Follow up with your healthcare provider, or as advised.    When to seek medical advice  Call your healthcare provider right away if any of the following occur:  · Fingers or toes become swollen, cold, blue, numb, or tingly  · You develop weakness in the affected arm or leg  · Pain increases and is not controlled by the above measures  Date Last Reviewed: 11/21/2015  © 5944-2850 [employer]. [address] [place] [address], [place] [address]. All rights reserved. This information  is not intended as a substitute for professional medical care. Always follow your healthcare professional's instructions.      Please follow up with your Primary care provider within 2-5 days if your signs and symptoms have not resolved or worsen.     If your condition worsens or fails to improve we recommend that you receive another evaluation at the emergency room immediately or contact your primary medical clinic to discuss your concerns.   You must understand that you have received an Urgent Care treatment only and that you may be released before all of your medical problems are known or treated. You, the patient, will arrange for follow up care as instructed.     RED FLAGS/WARNING SYMPTOMS DISCUSSED WITH PATIENT THAT WOULD WARRANT EMERGENT MEDICAL ATTENTION. PATIENT VERBALIZED UNDERSTANDING.

## 2020-10-30 ENCOUNTER — HOSPITAL ENCOUNTER (INPATIENT)
Facility: HOSPITAL | Age: 80
LOS: 3 days | Discharge: HOME HEALTH | DRG: 281 | End: 2020-11-03
Attending: EMERGENCY MEDICINE | Admitting: HOSPITALIST
Payer: MEDICARE

## 2020-10-30 DIAGNOSIS — I24.9 ACS (ACUTE CORONARY SYNDROME): ICD-10-CM

## 2020-10-30 DIAGNOSIS — R53.1 WEAKNESS: ICD-10-CM

## 2020-10-30 DIAGNOSIS — I21.4 NSTEMI (NON-ST ELEVATED MYOCARDIAL INFARCTION): ICD-10-CM

## 2020-10-30 DIAGNOSIS — R79.89 ELEVATED TROPONIN I LEVEL: ICD-10-CM

## 2020-10-30 DIAGNOSIS — R94.31 ABNORMAL EKG: ICD-10-CM

## 2020-10-30 DIAGNOSIS — R11.0 NAUSEA: ICD-10-CM

## 2020-10-30 DIAGNOSIS — N39.0 URINARY TRACT INFECTION WITHOUT HEMATURIA, SITE UNSPECIFIED: Primary | ICD-10-CM

## 2020-10-30 DIAGNOSIS — I25.10 CAD (CORONARY ARTERY DISEASE): ICD-10-CM

## 2020-10-30 DIAGNOSIS — R07.9 CHEST PAIN: ICD-10-CM

## 2020-10-30 DIAGNOSIS — E83.42 HYPOMAGNESEMIA: ICD-10-CM

## 2020-10-30 LAB
ALBUMIN SERPL BCP-MCNC: 3.4 G/DL (ref 3.5–5.2)
ALP SERPL-CCNC: 74 U/L (ref 55–135)
ALT SERPL W/O P-5'-P-CCNC: 12 U/L (ref 10–44)
ANION GAP SERPL CALC-SCNC: 15 MMOL/L (ref 8–16)
APTT BLDCRRT: 26.5 SEC (ref 21–32)
AST SERPL-CCNC: 25 U/L (ref 10–40)
BACTERIA #/AREA URNS AUTO: ABNORMAL /HPF
BASOPHILS # BLD AUTO: 0.03 K/UL (ref 0–0.2)
BASOPHILS # BLD AUTO: 0.04 K/UL (ref 0–0.2)
BASOPHILS NFR BLD: 0.4 % (ref 0–1.9)
BASOPHILS NFR BLD: 0.5 % (ref 0–1.9)
BILIRUB SERPL-MCNC: 0.5 MG/DL (ref 0.1–1)
BILIRUB UR QL STRIP: NEGATIVE
BNP SERPL-MCNC: 63 PG/ML (ref 0–99)
BUN SERPL-MCNC: 11 MG/DL (ref 8–23)
CALCIUM SERPL-MCNC: 9.2 MG/DL (ref 8.7–10.5)
CHLORIDE SERPL-SCNC: 94 MMOL/L (ref 95–110)
CHOLEST SERPL-MCNC: 140 MG/DL (ref 120–199)
CHOLEST/HDLC SERPL: 2.6 {RATIO} (ref 2–5)
CLARITY UR REFRACT.AUTO: CLEAR
CO2 SERPL-SCNC: 26 MMOL/L (ref 23–29)
COLOR UR AUTO: YELLOW
CREAT SERPL-MCNC: 1.4 MG/DL (ref 0.5–1.4)
CTP QC/QA: YES
DIFFERENTIAL METHOD: ABNORMAL
DIFFERENTIAL METHOD: ABNORMAL
EOSINOPHIL # BLD AUTO: 0.1 K/UL (ref 0–0.5)
EOSINOPHIL # BLD AUTO: 0.1 K/UL (ref 0–0.5)
EOSINOPHIL NFR BLD: 0.9 % (ref 0–8)
EOSINOPHIL NFR BLD: 1.2 % (ref 0–8)
ERYTHROCYTE [DISTWIDTH] IN BLOOD BY AUTOMATED COUNT: 12.3 % (ref 11.5–14.5)
ERYTHROCYTE [DISTWIDTH] IN BLOOD BY AUTOMATED COUNT: 12.4 % (ref 11.5–14.5)
EST. GFR  (AFRICAN AMERICAN): 41.5 ML/MIN/1.73 M^2
EST. GFR  (NON AFRICAN AMERICAN): 36 ML/MIN/1.73 M^2
ESTIMATED AVG GLUCOSE: 94 MG/DL (ref 68–131)
GLUCOSE SERPL-MCNC: 132 MG/DL (ref 70–110)
GLUCOSE UR QL STRIP: NEGATIVE
HBA1C MFR BLD HPLC: 4.9 % (ref 4–5.6)
HCT VFR BLD AUTO: 34.8 % (ref 37–48.5)
HCT VFR BLD AUTO: 37.5 % (ref 37–48.5)
HDLC SERPL-MCNC: 53 MG/DL (ref 40–75)
HDLC SERPL: 37.9 % (ref 20–50)
HGB BLD-MCNC: 11.9 G/DL (ref 12–16)
HGB BLD-MCNC: 12.5 G/DL (ref 12–16)
HGB UR QL STRIP: NEGATIVE
HYALINE CASTS UR QL AUTO: 12 /LPF
IMM GRANULOCYTES # BLD AUTO: 0.02 K/UL (ref 0–0.04)
IMM GRANULOCYTES # BLD AUTO: 0.02 K/UL (ref 0–0.04)
IMM GRANULOCYTES NFR BLD AUTO: 0.2 % (ref 0–0.5)
IMM GRANULOCYTES NFR BLD AUTO: 0.3 % (ref 0–0.5)
INR PPP: 1.1 (ref 0.8–1.2)
KETONES UR QL STRIP: NEGATIVE
LDLC SERPL CALC-MCNC: 60.4 MG/DL (ref 63–159)
LEUKOCYTE ESTERASE UR QL STRIP: ABNORMAL
LIPASE SERPL-CCNC: 33 U/L (ref 4–60)
LYMPHOCYTES # BLD AUTO: 1.3 K/UL (ref 1–4.8)
LYMPHOCYTES # BLD AUTO: 2 K/UL (ref 1–4.8)
LYMPHOCYTES NFR BLD: 14.8 % (ref 18–48)
LYMPHOCYTES NFR BLD: 27.9 % (ref 18–48)
MAGNESIUM SERPL-MCNC: 1.4 MG/DL (ref 1.6–2.6)
MCH RBC QN AUTO: 32.9 PG (ref 27–31)
MCH RBC QN AUTO: 33.4 PG (ref 27–31)
MCHC RBC AUTO-ENTMCNC: 33.3 G/DL (ref 32–36)
MCHC RBC AUTO-ENTMCNC: 34.2 G/DL (ref 32–36)
MCV RBC AUTO: 98 FL (ref 82–98)
MCV RBC AUTO: 99 FL (ref 82–98)
MICROSCOPIC COMMENT: ABNORMAL
MONOCYTES # BLD AUTO: 0.7 K/UL (ref 0.3–1)
MONOCYTES # BLD AUTO: 0.8 K/UL (ref 0.3–1)
MONOCYTES NFR BLD: 10.3 % (ref 4–15)
MONOCYTES NFR BLD: 7.6 % (ref 4–15)
NEUTROPHILS # BLD AUTO: 4.3 K/UL (ref 1.8–7.7)
NEUTROPHILS # BLD AUTO: 6.5 K/UL (ref 1.8–7.7)
NEUTROPHILS NFR BLD: 59.9 % (ref 38–73)
NEUTROPHILS NFR BLD: 76 % (ref 38–73)
NITRITE UR QL STRIP: NEGATIVE
NONHDLC SERPL-MCNC: 87 MG/DL
NRBC BLD-RTO: 0 /100 WBC
NRBC BLD-RTO: 0 /100 WBC
PH UR STRIP: 5 [PH] (ref 5–8)
PLATELET # BLD AUTO: 261 K/UL (ref 150–350)
PLATELET # BLD AUTO: 287 K/UL (ref 150–350)
PMV BLD AUTO: 9 FL (ref 9.2–12.9)
PMV BLD AUTO: 9.5 FL (ref 9.2–12.9)
POCT GLUCOSE: 87 MG/DL (ref 70–110)
POTASSIUM SERPL-SCNC: 4.1 MMOL/L (ref 3.5–5.1)
PROT SERPL-MCNC: 6.3 G/DL (ref 6–8.4)
PROT UR QL STRIP: NEGATIVE
PROTHROMBIN TIME: 11.9 SEC (ref 9–12.5)
RBC # BLD AUTO: 3.56 M/UL (ref 4–5.4)
RBC # BLD AUTO: 3.8 M/UL (ref 4–5.4)
RBC #/AREA URNS AUTO: 1 /HPF (ref 0–4)
SARS-COV-2 RDRP RESP QL NAA+PROBE: NEGATIVE
SODIUM SERPL-SCNC: 135 MMOL/L (ref 136–145)
SP GR UR STRIP: 1 (ref 1–1.03)
SQUAMOUS #/AREA URNS AUTO: 1 /HPF
TRIGL SERPL-MCNC: 133 MG/DL (ref 30–150)
TROPONIN I SERPL DL<=0.01 NG/ML-MCNC: 0.02 NG/ML (ref 0–0.03)
TROPONIN I SERPL DL<=0.01 NG/ML-MCNC: 0.03 NG/ML (ref 0–0.03)
TSH SERPL DL<=0.005 MIU/L-ACNC: 1.57 UIU/ML (ref 0.4–4)
URN SPEC COLLECT METH UR: ABNORMAL
WBC # BLD AUTO: 7.25 K/UL (ref 3.9–12.7)
WBC # BLD AUTO: 8.56 K/UL (ref 3.9–12.7)
WBC #/AREA URNS AUTO: 18 /HPF (ref 0–5)

## 2020-10-30 PROCEDURE — 96367 TX/PROPH/DG ADDL SEQ IV INF: CPT

## 2020-10-30 PROCEDURE — 96361 HYDRATE IV INFUSION ADD-ON: CPT

## 2020-10-30 PROCEDURE — U0002 COVID-19 LAB TEST NON-CDC: HCPCS | Performed by: HOSPITALIST

## 2020-10-30 PROCEDURE — 99291 PR CRITICAL CARE, E/M 30-74 MINUTES: ICD-10-PCS | Mod: CS,,, | Performed by: EMERGENCY MEDICINE

## 2020-10-30 PROCEDURE — 83735 ASSAY OF MAGNESIUM: CPT

## 2020-10-30 PROCEDURE — 93010 EKG 12-LEAD: ICD-10-PCS | Mod: ,,, | Performed by: INTERNAL MEDICINE

## 2020-10-30 PROCEDURE — 96375 TX/PRO/DX INJ NEW DRUG ADDON: CPT

## 2020-10-30 PROCEDURE — 83036 HEMOGLOBIN GLYCOSYLATED A1C: CPT

## 2020-10-30 PROCEDURE — 93010 EKG 12-LEAD: ICD-10-PCS | Mod: 77,,, | Performed by: INTERNAL MEDICINE

## 2020-10-30 PROCEDURE — 83880 ASSAY OF NATRIURETIC PEPTIDE: CPT

## 2020-10-30 PROCEDURE — 99291 CRITICAL CARE FIRST HOUR: CPT | Mod: CS,,, | Performed by: EMERGENCY MEDICINE

## 2020-10-30 PROCEDURE — 96365 THER/PROPH/DIAG IV INF INIT: CPT

## 2020-10-30 PROCEDURE — 85730 THROMBOPLASTIN TIME PARTIAL: CPT

## 2020-10-30 PROCEDURE — 83690 ASSAY OF LIPASE: CPT

## 2020-10-30 PROCEDURE — G0378 HOSPITAL OBSERVATION PER HR: HCPCS

## 2020-10-30 PROCEDURE — 93005 ELECTROCARDIOGRAM TRACING: CPT

## 2020-10-30 PROCEDURE — 85610 PROTHROMBIN TIME: CPT

## 2020-10-30 PROCEDURE — 25000003 PHARM REV CODE 250: Performed by: FAMILY MEDICINE

## 2020-10-30 PROCEDURE — 93010 ELECTROCARDIOGRAM REPORT: CPT | Mod: ,,, | Performed by: INTERNAL MEDICINE

## 2020-10-30 PROCEDURE — 84443 ASSAY THYROID STIM HORMONE: CPT

## 2020-10-30 PROCEDURE — 93010 ELECTROCARDIOGRAM REPORT: CPT | Mod: 77,,, | Performed by: INTERNAL MEDICINE

## 2020-10-30 PROCEDURE — 63600175 PHARM REV CODE 636 W HCPCS: Performed by: FAMILY MEDICINE

## 2020-10-30 PROCEDURE — 96366 THER/PROPH/DIAG IV INF ADDON: CPT

## 2020-10-30 PROCEDURE — 99291 CRITICAL CARE FIRST HOUR: CPT | Mod: 25

## 2020-10-30 PROCEDURE — 87086 URINE CULTURE/COLONY COUNT: CPT

## 2020-10-30 PROCEDURE — 25000003 PHARM REV CODE 250: Performed by: EMERGENCY MEDICINE

## 2020-10-30 PROCEDURE — 85025 COMPLETE CBC W/AUTO DIFF WBC: CPT

## 2020-10-30 PROCEDURE — 96365 THER/PROPH/DIAG IV INF INIT: CPT | Mod: 59

## 2020-10-30 PROCEDURE — 99223 PR INITIAL HOSPITAL CARE,LEVL III: ICD-10-PCS | Mod: 25,GC,, | Performed by: INTERNAL MEDICINE

## 2020-10-30 PROCEDURE — 80061 LIPID PANEL: CPT

## 2020-10-30 PROCEDURE — 63600175 PHARM REV CODE 636 W HCPCS: Performed by: EMERGENCY MEDICINE

## 2020-10-30 PROCEDURE — 99223 1ST HOSP IP/OBS HIGH 75: CPT | Mod: 25,GC,, | Performed by: INTERNAL MEDICINE

## 2020-10-30 PROCEDURE — 81001 URINALYSIS AUTO W/SCOPE: CPT

## 2020-10-30 PROCEDURE — 80053 COMPREHEN METABOLIC PANEL: CPT

## 2020-10-30 PROCEDURE — 84484 ASSAY OF TROPONIN QUANT: CPT

## 2020-10-30 RX ORDER — MAGNESIUM SULFATE HEPTAHYDRATE 40 MG/ML
2 INJECTION, SOLUTION INTRAVENOUS
Status: COMPLETED | OUTPATIENT
Start: 2020-10-30 | End: 2020-10-30

## 2020-10-30 RX ORDER — ACETAMINOPHEN 500 MG
1000 TABLET ORAL EVERY 8 HOURS PRN
Status: DISCONTINUED | OUTPATIENT
Start: 2020-10-30 | End: 2020-11-03 | Stop reason: HOSPADM

## 2020-10-30 RX ORDER — INSULIN ASPART 100 [IU]/ML
0-5 INJECTION, SOLUTION INTRAVENOUS; SUBCUTANEOUS
Status: DISCONTINUED | OUTPATIENT
Start: 2020-10-30 | End: 2020-11-02

## 2020-10-30 RX ORDER — HEPARIN SODIUM,PORCINE/D5W 25000/250
12 INTRAVENOUS SOLUTION INTRAVENOUS CONTINUOUS
Status: DISCONTINUED | OUTPATIENT
Start: 2020-10-30 | End: 2020-11-02

## 2020-10-30 RX ORDER — TALC
6 POWDER (GRAM) TOPICAL NIGHTLY PRN
Status: DISCONTINUED | OUTPATIENT
Start: 2020-10-30 | End: 2020-11-03 | Stop reason: HOSPADM

## 2020-10-30 RX ORDER — SODIUM CHLORIDE 0.9 % (FLUSH) 0.9 %
10 SYRINGE (ML) INJECTION
Status: DISCONTINUED | OUTPATIENT
Start: 2020-10-30 | End: 2020-11-03 | Stop reason: HOSPADM

## 2020-10-30 RX ORDER — CEFTRIAXONE 1 G/1
1 INJECTION, POWDER, FOR SOLUTION INTRAMUSCULAR; INTRAVENOUS
Status: COMPLETED | OUTPATIENT
Start: 2020-10-30 | End: 2020-10-30

## 2020-10-30 RX ORDER — IPRATROPIUM BROMIDE AND ALBUTEROL SULFATE 2.5; .5 MG/3ML; MG/3ML
3 SOLUTION RESPIRATORY (INHALATION) EVERY 6 HOURS PRN
Status: DISCONTINUED | OUTPATIENT
Start: 2020-10-30 | End: 2020-11-03 | Stop reason: HOSPADM

## 2020-10-30 RX ORDER — IBUPROFEN 200 MG
24 TABLET ORAL
Status: DISCONTINUED | OUTPATIENT
Start: 2020-10-30 | End: 2020-11-03 | Stop reason: HOSPADM

## 2020-10-30 RX ORDER — ATORVASTATIN CALCIUM 20 MG/1
40 TABLET, FILM COATED ORAL NIGHTLY
Status: DISCONTINUED | OUTPATIENT
Start: 2020-10-30 | End: 2020-11-03 | Stop reason: HOSPADM

## 2020-10-30 RX ORDER — ONDANSETRON 2 MG/ML
4 INJECTION INTRAMUSCULAR; INTRAVENOUS
Status: COMPLETED | OUTPATIENT
Start: 2020-10-30 | End: 2020-10-30

## 2020-10-30 RX ORDER — GLUCAGON 1 MG
1 KIT INJECTION
Status: DISCONTINUED | OUTPATIENT
Start: 2020-10-30 | End: 2020-11-03 | Stop reason: HOSPADM

## 2020-10-30 RX ORDER — CEFTRIAXONE 1 G/1
1 INJECTION, POWDER, FOR SOLUTION INTRAMUSCULAR; INTRAVENOUS
Status: DISCONTINUED | OUTPATIENT
Start: 2020-10-31 | End: 2020-11-01

## 2020-10-30 RX ORDER — CIPROFLOXACIN 250 MG/1
250 TABLET, FILM COATED ORAL 2 TIMES DAILY
Qty: 6 TABLET | Refills: 0 | Status: SHIPPED | OUTPATIENT
Start: 2020-10-30 | End: 2020-11-01 | Stop reason: HOSPADM

## 2020-10-30 RX ORDER — ASPIRIN 325 MG
325 TABLET, DELAYED RELEASE (ENTERIC COATED) ORAL
Status: COMPLETED | OUTPATIENT
Start: 2020-10-30 | End: 2020-10-30

## 2020-10-30 RX ORDER — IBUPROFEN 200 MG
16 TABLET ORAL
Status: DISCONTINUED | OUTPATIENT
Start: 2020-10-30 | End: 2020-11-03 | Stop reason: HOSPADM

## 2020-10-30 RX ORDER — METOPROLOL TARTRATE 25 MG/1
12.5 TABLET ORAL 2 TIMES DAILY
Status: DISCONTINUED | OUTPATIENT
Start: 2020-10-30 | End: 2020-11-01

## 2020-10-30 RX ORDER — ONDANSETRON 2 MG/ML
4 INJECTION INTRAMUSCULAR; INTRAVENOUS EVERY 8 HOURS PRN
Status: DISCONTINUED | OUTPATIENT
Start: 2020-10-30 | End: 2020-11-03 | Stop reason: HOSPADM

## 2020-10-30 RX ADMIN — METOPROLOL TARTRATE 12.5 MG: 25 TABLET, FILM COATED ORAL at 10:10

## 2020-10-30 RX ADMIN — ONDANSETRON 4 MG: 2 INJECTION INTRAMUSCULAR; INTRAVENOUS at 10:10

## 2020-10-30 RX ADMIN — HEPARIN SODIUM AND DEXTROSE 12 UNITS/KG/HR: 10000; 5 INJECTION INTRAVENOUS at 09:10

## 2020-10-30 RX ADMIN — PROMETHAZINE HYDROCHLORIDE 12.5 MG: 25 INJECTION INTRAMUSCULAR; INTRAVENOUS at 06:10

## 2020-10-30 RX ADMIN — ASPIRIN 325 MG: 325 TABLET, COATED ORAL at 08:10

## 2020-10-30 RX ADMIN — SODIUM CHLORIDE 1000 ML: 0.9 INJECTION, SOLUTION INTRAVENOUS at 01:10

## 2020-10-30 RX ADMIN — ATORVASTATIN CALCIUM 40 MG: 20 TABLET, FILM COATED ORAL at 10:10

## 2020-10-30 RX ADMIN — TICAGRELOR 180 MG: 90 TABLET ORAL at 08:10

## 2020-10-30 RX ADMIN — ONDANSETRON 4 MG: 2 INJECTION INTRAMUSCULAR; INTRAVENOUS at 01:10

## 2020-10-30 RX ADMIN — CEFTRIAXONE SODIUM 1 G: 1 INJECTION, POWDER, FOR SOLUTION INTRAMUSCULAR; INTRAVENOUS at 06:10

## 2020-10-30 RX ADMIN — MAGNESIUM SULFATE IN WATER 2 G: 40 INJECTION, SOLUTION INTRAVENOUS at 03:10

## 2020-10-30 NOTE — PROVIDER PROGRESS NOTES - EMERGENCY DEPT.
Encounter Date: 10/30/2020    ED Physician Progress Notes         EKG - STEMI Decision  Initial Reading: No STEMI present.  Response: patient moved to monitored bed.    Diffuse STD, no prior for comparison.  Advised bring to ED bed STAT.

## 2020-10-30 NOTE — Clinical Note
Catheter is repositioned to the ostium   right coronary artery. Angiography performed of the right coronary arteries in multiple views. Angiography performed via hand injection with .  Catheter removed

## 2020-10-30 NOTE — ED NOTES
..  Patient identifiers for Ayaka Ellison 80 y.o. female checked and correct.  Chief Complaint   Patient presents with    Nausea     c/o N/V onset few hours ago, also c/o SOB     Past Medical History:   Diagnosis Date    Arthritis     Cataract     Glaucoma     Hyperlipidemia     Hypertension     Sleep apnea      Allergies reported:   Review of patient's allergies indicates:   Allergen Reactions    Penicillins Itching    Codeine Nausea Only         LOC: Patient is awake, alert, and aware of environment with an appropriate affect. Patient is oriented x 3 and speaking appropriately.  APPEARANCE: Patient resting comfortably and in no acute distress. Patient is clean and well groomed, patient's clothing is properly fastened.  HEENT: **AAO--c/o fatigue and sob with exertion. Appetite down --Recently reports hypomagneium 1 month ago  SKIN: The skin is warm and dry. Patient has normal skin turgor and moist mucus membranes. Skin is intact; no bruising or breakdown noted.  MUSKULOSKELETAL: Patient is moving all extremities well, no obvious deformities noted. Pulses intact.   RESPIRATORY: Airway is open and patent. Respirations are spontaneous and non-labored with normal effort and rate, BBS=clear  CARDIAC: Patient has a normal rate and rhythm.Denies chest pain ** on cardiac monitor,No peripheral edema noted.   ABDOMEN: No distention noted. Bowel sounds active in all 4 quadrants. Soft and non-tender upon palpation.  NEUROLOGICAL: pupils **mm, PERRL. Facial expression is symmetrical. Hand grasps are equal bilaterally. Normal sensation in all extremities when touched with finger.

## 2020-10-30 NOTE — ED PROVIDER NOTES
Encounter Date: 10/30/2020       History     Chief Complaint   Patient presents with    Nausea     c/o N/V onset few hours ago, also c/o SOB     79 yo W with pmhx HTN, HLD, JOVANNA presents with a chief complaint of generalized weakness.  Patient has had 1 week of worsening fatigue and generalized weakness.  Associated with dyspnea with mild exertion.  Patient had an episode of vomiting earlier today and is nauseated.  She has had decreased p.o. intake.  She denies any fever, chest pain, abdominal pain.  Patient is accompanied by her daughter who reported a similar episode several weeks ago that was attributed to hypomagnesemia.  Patient lives alone at baseline.        Review of patient's allergies indicates:   Allergen Reactions    Penicillins Itching    Codeine Nausea Only     Past Medical History:   Diagnosis Date    Arthritis     Cataract     Glaucoma     Hyperlipidemia     Hypertension     Sleep apnea      Past Surgical History:   Procedure Laterality Date    APPENDECTOMY      BREAST SURGERY      right breast lumpectomy    CHOLECYSTECTOMY      HAND SURGERY Right      History reviewed. No pertinent family history.  Social History     Tobacco Use    Smoking status: Light Tobacco Smoker     Types: Vaping w/o nicotine    Smokeless tobacco: Never Used   Substance Use Topics    Alcohol use: Yes    Drug use: No     Review of Systems   Constitutional: Positive for appetite change and fatigue. Negative for fever.   HENT: Negative for sore throat.    Respiratory: Positive for shortness of breath. Negative for cough.    Cardiovascular: Negative for chest pain.   Gastrointestinal: Positive for nausea and vomiting. Negative for abdominal pain.   Genitourinary: Negative for dysuria.   Musculoskeletal: Negative for back pain.   Skin: Negative for rash.   Neurological: Negative for weakness.   Hematological: Does not bruise/bleed easily.       Physical Exam     Initial Vitals [10/30/20 1257]   BP Pulse Resp Temp  SpO2   137/73 82 17 97.5 °F (36.4 °C) 99 %      MAP       --         Physical Exam    Nursing note and vitals reviewed.  Constitutional: She appears well-developed and well-nourished. She is not diaphoretic. No distress.   HENT:   Head: Normocephalic and atraumatic.   Dry mucus membranes   Eyes: EOM are normal. Right eye exhibits no discharge. Left eye exhibits no discharge. No scleral icterus.   Neck: Normal range of motion. Neck supple. No JVD present.   Cardiovascular: Normal rate, regular rhythm, normal heart sounds and intact distal pulses. Exam reveals no gallop and no friction rub.    No murmur heard.  Pulmonary/Chest: Breath sounds normal. No respiratory distress. She has no wheezes. She has no rhonchi. She has no rales. She exhibits no tenderness.   Speaking complete sentences on room air   Abdominal: Soft. Bowel sounds are normal. She exhibits no distension and no mass. There is no abdominal tenderness. There is no rebound and no guarding.   Musculoskeletal: Normal range of motion. No tenderness or edema.   Neurological: She is alert.   Interactive with normal strength   Skin: Skin is warm and dry. Capillary refill takes 2 to 3 seconds.   Psychiatric: She has a normal mood and affect.         ED Course   Procedures  Labs Reviewed   CBC W/ AUTO DIFFERENTIAL - Abnormal; Notable for the following components:       Result Value    RBC 3.80 (*)     MCV 99 (*)     MCH 32.9 (*)     Gran % 76.0 (*)     Lymph % 14.8 (*)     All other components within normal limits   COMPREHENSIVE METABOLIC PANEL - Abnormal; Notable for the following components:    Sodium 135 (*)     Chloride 94 (*)     Glucose 132 (*)     Albumin 3.4 (*)     eGFR if  41.5 (*)     eGFR if non  36.0 (*)     All other components within normal limits   MAGNESIUM - Abnormal; Notable for the following components:    Magnesium 1.4 (*)     All other components within normal limits   URINALYSIS, REFLEX TO URINE CULTURE -  Abnormal; Notable for the following components:    Leukocytes, UA 2+ (*)     All other components within normal limits    Narrative:     Specimen Source->Urine   URINALYSIS MICROSCOPIC - Abnormal; Notable for the following components:    WBC, UA 18 (*)     Hyaline Casts, UA 12 (*)     All other components within normal limits    Narrative:     Specimen Source->Urine   TROPONIN I - Abnormal; Notable for the following components:    Troponin I 0.028 (*)     All other components within normal limits   CULTURE, URINE   TROPONIN I   B-TYPE NATRIURETIC PEPTIDE   LIPASE   TSH   APTT   PROTIME-INR   CBC W/ AUTO DIFFERENTIAL     EKG Readings: (Independently Interpreted)   Initial Reading: No STEMI. Rhythm: Normal Sinus Rhythm. Heart Rate: 72. ST Segment Depression: I, II, III, AVF, V4, V5 and V6. T Waves Flipped: AVL. Axis: Normal.     ECG Results          EKG 12-lead (Final result)  Result time 10/30/20 15:27:43    Final result by Interface, Lab In [facility] (10/30/20 15:27:43)                 Narrative:    Test Reason : R06.02,    Vent. Rate : 075 BPM     Atrial Rate : 075 BPM     P-R Int : 148 ms          QRS Dur : 078 ms      QT Int : 396 ms       P-R-T Axes : 052 052 102 degrees     QTc Int : 442 ms    Normal sinus rhythm  ST and T wave abnormality, consider inferolateral ischemia  Abnormal ECG  No previous ECGs available  Confirmed by Grace Armenta MD ([id number]) on 10/30/2020 3:27:29 PM    Referred By: [email or web address]   SELF           Confirmed By:Grace Armenta MD                            Imaging Results          X-Ray Chest AP Portable (Final result)  Result time 10/30/20 15:13:51    Final result by Sanchez Nunez MD (10/30/20 15:13:51)                 Impression:      No acute abnormality.      Electronically signed by: Sanchez Nunez MD  Date:    10/30/2020  Time:    15:13             Narrative:    EXAMINATION:  XR CHEST AP PORTABLE    CLINICAL HISTORY:  Dyspnea, unspecified    TECHNIQUE:  Single frontal view of the chest  was performed.    COMPARISON:  09/05/2019    FINDINGS:  The lungs are clear with normal appearance of pulmonary vasculature. No pleural effusion. No evident pneumothorax.    The cardiac silhouette is normal in size. The hilar and mediastinal contours are unremarkable.    Bones are intact.                                 Medical Decision Making:   History:   I obtained history from: someone other than patient.  Old Medical Records: I decided to obtain old medical records.  Initial Assessment:   79 yo W with pmhx HTN, HLD, JOVANNA presents with a chief complaint of generalized weakness associated with fatigue, dyspnea on exertion, 1 episode of vomiting.  EKG with inferior lateral ST depressions without a prior to compare.   Differential Diagnosis:   ACS, electrolyte abnormalities, CHF, pneumonia, anemia, UTI, infectious issues  Clinical Tests:   Lab Tests: Ordered  Radiological Study: Ordered  Medical Tests: Ordered  ED Management:  Will administer IV fluids and antiemetics.  Will obtain labs and chest x-ray.     Reassessment:  CBC without leukocytosis or anemia.  CMP with creatinine 1.4, unknown baseline but BUN is 11.  There is hypomagnesemia of 1.4 which was replaced IV.  BNP is within normal limits.  Troponin is high normal.  Patient denies any chest pain, I doubt ACS, however given ST depressions, will obtain a repeat troponin and EKG.  UA with a possible UTI.  Will send culture and treat with ceftriaxone.  Lipase normal.  TSH normal.  Chest x-ray without acute process.  On repeat assessment, patient remains resting comfortably.  She reports nausea persists.  Will administer Phenergan.  Awaiting 2nd troponin, EKG, ambulatory pulse ox, and response to therapy.    Reassessment:  Patient tolerated ambulatory pulse ox greater than 98%.  Repeat EKG with persistent inferior ST depressions but not worse than before.  Again, patient denies any chest pain.  Repeat troponin now positive to 0.028.  On repeat assessment she  reports her nausea resolved.  Given screening EKG and upgoing troponin, I am concerned for possible ACS.  Nausea could have in fact been an anginal equivalent.  Cardiology consulted for recommendations.    Reassessment:  Cardiology feels this is consistent with unstable angina.  ACS pathway initiated.  Heparin, aspirin, Plavix.  Patient will be admitted.  She is symptom-free at this time.                  Attending Attestation:         Attending Critical Care:   Critical Care Times:   Direct Patient Care (initial evaluation, reassessments, and time considering the case)................................................................15 minutes.   Additional History from reviewing old medical records or taking additional history from the family, EMS, PCP, etc.......................5 minutes.   Ordering, Reviewing, and Interpreting Diagnostic Studies...............................................................................................................5 minutes.   Documentation..................................................................................................................................................................................5 minutes.   Consultation with other Physicians. .................................................................................................................................................5 minutes.   ==============================================================  · Total Critical Care Time - exclusive of procedural time: 35 minutes.  ==============================================================  Critical care was necessary to treat or prevent imminent or life-threatening deterioration of the following conditions: myocardial infarction.                         Clinical Impression:       ICD-10-CM ICD-9-CM   1. Urinary tract infection without hematuria, site unspecified  N39.0 599.0   2. Hypomagnesemia  E83.42 275.2   3. Nausea  R11.0 787.02   4. Abnormal EKG   R94.31 794.31   5. Elevated troponin I level  R77.8 790.6   6. ACS (acute coronary syndrome)  I24.9 411.1                          ED Disposition Condition    Admit                             Manas Carr MD  10/30/20 2019

## 2020-10-31 PROBLEM — I21.4 NSTEMI (NON-ST ELEVATED MYOCARDIAL INFARCTION): Status: ACTIVE | Noted: 2020-10-31

## 2020-10-31 PROBLEM — E11.9 DIABETES: Status: ACTIVE | Noted: 2020-10-31

## 2020-10-31 PROBLEM — N30.00 ACUTE CYSTITIS: Status: ACTIVE | Noted: 2020-10-31

## 2020-10-31 LAB
ALBUMIN SERPL BCP-MCNC: 2.7 G/DL (ref 3.5–5.2)
ALP SERPL-CCNC: 58 U/L (ref 55–135)
ALT SERPL W/O P-5'-P-CCNC: 9 U/L (ref 10–44)
ANION GAP SERPL CALC-SCNC: 14 MMOL/L (ref 8–16)
APTT BLDCRRT: 36.7 SEC (ref 21–32)
APTT BLDCRRT: 89.4 SEC (ref 21–32)
ASCENDING AORTA: 2.95 CM
AST SERPL-CCNC: 25 U/L (ref 10–40)
AV INDEX (PROSTH): 0.61
AV MEAN GRADIENT: 3 MMHG
AV PEAK GRADIENT: 6 MMHG
AV VALVE AREA: 1.81 CM2
AV VELOCITY RATIO: 0.56
BACTERIA UR CULT: NORMAL
BASOPHILS # BLD AUTO: 0.05 K/UL (ref 0–0.2)
BASOPHILS # BLD AUTO: 0.05 K/UL (ref 0–0.2)
BASOPHILS NFR BLD: 0.7 % (ref 0–1.9)
BASOPHILS NFR BLD: 0.7 % (ref 0–1.9)
BILIRUB SERPL-MCNC: 0.3 MG/DL (ref 0.1–1)
BSA FOR ECHO PROCEDURE: 1.73 M2
BUN SERPL-MCNC: 9 MG/DL (ref 8–23)
CALCIUM SERPL-MCNC: 8 MG/DL (ref 8.7–10.5)
CHLORIDE SERPL-SCNC: 100 MMOL/L (ref 95–110)
CO2 SERPL-SCNC: 23 MMOL/L (ref 23–29)
CREAT SERPL-MCNC: 1 MG/DL (ref 0.5–1.4)
CV ECHO LV RWT: 0.48 CM
DIFFERENTIAL METHOD: ABNORMAL
DIFFERENTIAL METHOD: ABNORMAL
DOP CALC AO PEAK VEL: 1.23 M/S
DOP CALC AO VTI: 26.93 CM
DOP CALC LVOT AREA: 3 CM2
DOP CALC LVOT DIAMETER: 1.94 CM
DOP CALC LVOT PEAK VEL: 0.69 M/S
DOP CALC LVOT STROKE VOLUME: 48.84 CM3
DOP CALCLVOT PEAK VEL VTI: 16.53 CM
E WAVE DECELERATION TIME: 324.55 MSEC
E/A RATIO: 0.78
E/E' RATIO: 9.27 M/S
ECHO LV POSTERIOR WALL: 0.96 CM (ref 0.6–1.1)
EOSINOPHIL # BLD AUTO: 0.3 K/UL (ref 0–0.5)
EOSINOPHIL # BLD AUTO: 0.3 K/UL (ref 0–0.5)
EOSINOPHIL NFR BLD: 3.5 % (ref 0–8)
EOSINOPHIL NFR BLD: 3.5 % (ref 0–8)
ERYTHROCYTE [DISTWIDTH] IN BLOOD BY AUTOMATED COUNT: 12.8 % (ref 11.5–14.5)
ERYTHROCYTE [DISTWIDTH] IN BLOOD BY AUTOMATED COUNT: 12.8 % (ref 11.5–14.5)
EST. GFR  (AFRICAN AMERICAN): >60 ML/MIN/1.73 M^2
EST. GFR  (NON AFRICAN AMERICAN): 54.1 ML/MIN/1.73 M^2
FRACTIONAL SHORTENING: 38 % (ref 28–44)
GLUCOSE SERPL-MCNC: 96 MG/DL (ref 70–110)
HCT VFR BLD AUTO: 35.2 % (ref 37–48.5)
HCT VFR BLD AUTO: 35.2 % (ref 37–48.5)
HGB BLD-MCNC: 11.4 G/DL (ref 12–16)
HGB BLD-MCNC: 11.4 G/DL (ref 12–16)
IMM GRANULOCYTES # BLD AUTO: 0.02 K/UL (ref 0–0.04)
IMM GRANULOCYTES # BLD AUTO: 0.02 K/UL (ref 0–0.04)
IMM GRANULOCYTES NFR BLD AUTO: 0.3 % (ref 0–0.5)
IMM GRANULOCYTES NFR BLD AUTO: 0.3 % (ref 0–0.5)
INTERVENTRICULAR SEPTUM: 0.93 CM (ref 0.6–1.1)
IVRT: 145.58 MSEC
LA MAJOR: 5.19 CM
LA MINOR: 5.17 CM
LA WIDTH: 3.49 CM
LEFT ATRIUM SIZE: 2.78 CM
LEFT ATRIUM VOLUME INDEX: 24.8 ML/M2
LEFT ATRIUM VOLUME: 42.72 CM3
LEFT INTERNAL DIMENSION IN SYSTOLE: 2.51 CM (ref 2.1–4)
LEFT VENTRICLE DIASTOLIC VOLUME INDEX: 41.1 ML/M2
LEFT VENTRICLE DIASTOLIC VOLUME: 70.86 ML
LEFT VENTRICLE MASS INDEX: 69 G/M2
LEFT VENTRICLE SYSTOLIC VOLUME INDEX: 13.1 ML/M2
LEFT VENTRICLE SYSTOLIC VOLUME: 22.58 ML
LEFT VENTRICULAR INTERNAL DIMENSION IN DIASTOLE: 4.02 CM (ref 3.5–6)
LEFT VENTRICULAR MASS: 118.29 G
LV LATERAL E/E' RATIO: 8.5 M/S
LV SEPTAL E/E' RATIO: 10.2 M/S
LYMPHOCYTES # BLD AUTO: 1.5 K/UL (ref 1–4.8)
LYMPHOCYTES # BLD AUTO: 1.5 K/UL (ref 1–4.8)
LYMPHOCYTES NFR BLD: 20.1 % (ref 18–48)
LYMPHOCYTES NFR BLD: 20.1 % (ref 18–48)
MAGNESIUM SERPL-MCNC: 1.9 MG/DL (ref 1.6–2.6)
MCH RBC QN AUTO: 32.7 PG (ref 27–31)
MCH RBC QN AUTO: 32.7 PG (ref 27–31)
MCHC RBC AUTO-ENTMCNC: 32.4 G/DL (ref 32–36)
MCHC RBC AUTO-ENTMCNC: 32.4 G/DL (ref 32–36)
MCV RBC AUTO: 101 FL (ref 82–98)
MCV RBC AUTO: 101 FL (ref 82–98)
MONOCYTES # BLD AUTO: 0.7 K/UL (ref 0.3–1)
MONOCYTES # BLD AUTO: 0.7 K/UL (ref 0.3–1)
MONOCYTES NFR BLD: 9.9 % (ref 4–15)
MONOCYTES NFR BLD: 9.9 % (ref 4–15)
MV PEAK A VEL: 0.65 M/S
MV PEAK E VEL: 0.51 M/S
MV STENOSIS PRESSURE HALF TIME: 94.12 MS
MV VALVE AREA P 1/2 METHOD: 2.34 CM2
NEUTROPHILS # BLD AUTO: 4.9 K/UL (ref 1.8–7.7)
NEUTROPHILS # BLD AUTO: 4.9 K/UL (ref 1.8–7.7)
NEUTROPHILS NFR BLD: 65.5 % (ref 38–73)
NEUTROPHILS NFR BLD: 65.5 % (ref 38–73)
NRBC BLD-RTO: 0 /100 WBC
NRBC BLD-RTO: 0 /100 WBC
PLATELET # BLD AUTO: 264 K/UL (ref 150–350)
PLATELET # BLD AUTO: 264 K/UL (ref 150–350)
PMV BLD AUTO: 8.9 FL (ref 9.2–12.9)
PMV BLD AUTO: 8.9 FL (ref 9.2–12.9)
POCT GLUCOSE: 105 MG/DL (ref 70–110)
POCT GLUCOSE: 106 MG/DL (ref 70–110)
POCT GLUCOSE: 115 MG/DL (ref 70–110)
POCT GLUCOSE: 86 MG/DL (ref 70–110)
POTASSIUM SERPL-SCNC: 3.4 MMOL/L (ref 3.5–5.1)
PROT SERPL-MCNC: 5.2 G/DL (ref 6–8.4)
RA MAJOR: 4.51 CM
RA PRESSURE: 3 MMHG
RA WIDTH: 2.31 CM
RBC # BLD AUTO: 3.49 M/UL (ref 4–5.4)
RBC # BLD AUTO: 3.49 M/UL (ref 4–5.4)
RIGHT VENTRICULAR END-DIASTOLIC DIMENSION: 2.19 CM
RV TISSUE DOPPLER FREE WALL SYSTOLIC VELOCITY 1 (APICAL 4 CHAMBER VIEW): 9.22 CM/S
SINUS: 2.8 CM
SODIUM SERPL-SCNC: 137 MMOL/L (ref 136–145)
STJ: 2.09 CM
TDI LATERAL: 0.06 M/S
TDI SEPTAL: 0.05 M/S
TDI: 0.06 M/S
TRICUSPID ANNULAR PLANE SYSTOLIC EXCURSION: 1.47 CM
TROPONIN I SERPL DL<=0.01 NG/ML-MCNC: 0.04 NG/ML (ref 0–0.03)
TROPONIN I SERPL DL<=0.01 NG/ML-MCNC: 0.05 NG/ML (ref 0–0.03)
WBC # BLD AUTO: 7.48 K/UL (ref 3.9–12.7)
WBC # BLD AUTO: 7.48 K/UL (ref 3.9–12.7)

## 2020-10-31 PROCEDURE — 83735 ASSAY OF MAGNESIUM: CPT

## 2020-10-31 PROCEDURE — 25000003 PHARM REV CODE 250: Performed by: FAMILY MEDICINE

## 2020-10-31 PROCEDURE — 80053 COMPREHEN METABOLIC PANEL: CPT

## 2020-10-31 PROCEDURE — 84484 ASSAY OF TROPONIN QUANT: CPT | Mod: 91

## 2020-10-31 PROCEDURE — 85730 THROMBOPLASTIN TIME PARTIAL: CPT | Mod: 91

## 2020-10-31 PROCEDURE — 99223 PR INITIAL HOSPITAL CARE,LEVL III: ICD-10-PCS | Mod: ,,, | Performed by: FAMILY MEDICINE

## 2020-10-31 PROCEDURE — 36415 COLL VENOUS BLD VENIPUNCTURE: CPT

## 2020-10-31 PROCEDURE — 97530 THERAPEUTIC ACTIVITIES: CPT

## 2020-10-31 PROCEDURE — 11000001 HC ACUTE MED/SURG PRIVATE ROOM

## 2020-10-31 PROCEDURE — 99223 1ST HOSP IP/OBS HIGH 75: CPT | Mod: ,,, | Performed by: FAMILY MEDICINE

## 2020-10-31 PROCEDURE — 97161 PT EVAL LOW COMPLEX 20 MIN: CPT

## 2020-10-31 PROCEDURE — 97535 SELF CARE MNGMENT TRAINING: CPT

## 2020-10-31 PROCEDURE — 85025 COMPLETE CBC W/AUTO DIFF WBC: CPT

## 2020-10-31 PROCEDURE — 97165 OT EVAL LOW COMPLEX 30 MIN: CPT

## 2020-10-31 PROCEDURE — 25000003 PHARM REV CODE 250: Performed by: HOSPITALIST

## 2020-10-31 PROCEDURE — 84484 ASSAY OF TROPONIN QUANT: CPT

## 2020-10-31 PROCEDURE — 96366 THER/PROPH/DIAG IV INF ADDON: CPT

## 2020-10-31 PROCEDURE — 63600175 PHARM REV CODE 636 W HCPCS: Performed by: EMERGENCY MEDICINE

## 2020-10-31 PROCEDURE — 63600175 PHARM REV CODE 636 W HCPCS: Performed by: HOSPITALIST

## 2020-10-31 PROCEDURE — 63600175 PHARM REV CODE 636 W HCPCS: Performed by: FAMILY MEDICINE

## 2020-10-31 RX ORDER — SERTRALINE HYDROCHLORIDE 50 MG/1
100 TABLET, FILM COATED ORAL DAILY
Status: DISCONTINUED | OUTPATIENT
Start: 2020-10-31 | End: 2020-11-03 | Stop reason: HOSPADM

## 2020-10-31 RX ORDER — LOSARTAN POTASSIUM 50 MG/1
50 TABLET ORAL DAILY
Status: DISCONTINUED | OUTPATIENT
Start: 2020-10-31 | End: 2020-11-02

## 2020-10-31 RX ORDER — POTASSIUM CHLORIDE 20 MEQ/1
40 TABLET, EXTENDED RELEASE ORAL ONCE
Status: COMPLETED | OUTPATIENT
Start: 2020-10-31 | End: 2020-10-31

## 2020-10-31 RX ORDER — ASPIRIN 81 MG/1
81 TABLET ORAL DAILY
Status: DISCONTINUED | OUTPATIENT
Start: 2020-10-31 | End: 2020-11-03 | Stop reason: HOSPADM

## 2020-10-31 RX ORDER — LANOLIN ALCOHOL/MO/W.PET/CERES
400 CREAM (GRAM) TOPICAL ONCE
Status: COMPLETED | OUTPATIENT
Start: 2020-10-31 | End: 2020-10-31

## 2020-10-31 RX ADMIN — Medication 400 MG: at 09:10

## 2020-10-31 RX ADMIN — PROMETHAZINE HYDROCHLORIDE 12.5 MG: 25 INJECTION INTRAMUSCULAR; INTRAVENOUS at 12:10

## 2020-10-31 RX ADMIN — TICAGRELOR 90 MG: 90 TABLET ORAL at 09:10

## 2020-10-31 RX ADMIN — LOSARTAN POTASSIUM 50 MG: 50 TABLET, FILM COATED ORAL at 09:10

## 2020-10-31 RX ADMIN — MELATONIN TAB 3 MG 6 MG: 3 TAB at 09:10

## 2020-10-31 RX ADMIN — SERTRALINE HYDROCHLORIDE 100 MG: 50 TABLET ORAL at 09:10

## 2020-10-31 RX ADMIN — CEFTRIAXONE SODIUM 1 G: 1 INJECTION, POWDER, FOR SOLUTION INTRAMUSCULAR; INTRAVENOUS at 09:10

## 2020-10-31 RX ADMIN — POTASSIUM CHLORIDE 40 MEQ: 1500 TABLET, EXTENDED RELEASE ORAL at 09:10

## 2020-10-31 RX ADMIN — HEPARIN SODIUM AND DEXTROSE 9 UNITS/KG/HR: 10000; 5 INJECTION INTRAVENOUS at 09:10

## 2020-10-31 RX ADMIN — ATORVASTATIN CALCIUM 40 MG: 20 TABLET, FILM COATED ORAL at 09:10

## 2020-10-31 RX ADMIN — METOPROLOL TARTRATE 12.5 MG: 25 TABLET, FILM COATED ORAL at 09:10

## 2020-10-31 RX ADMIN — HEPARIN SODIUM AND DEXTROSE 11 UNITS/KG/HR: 10000; 5 INJECTION INTRAVENOUS at 05:10

## 2020-10-31 RX ADMIN — ASPIRIN 81 MG: 81 TABLET, COATED ORAL at 09:10

## 2020-10-31 NOTE — SUBJECTIVE & OBJECTIVE
Past Medical History:   Diagnosis Date    Arthritis     Cataract     Glaucoma     Hyperlipidemia     Hypertension     Sleep apnea        Past Surgical History:   Procedure Laterality Date    APPENDECTOMY      BREAST SURGERY      right breast lumpectomy    CHOLECYSTECTOMY      HAND SURGERY Right        Review of patient's allergies indicates:   Allergen Reactions    Penicillins Itching    Codeine Nausea Only       No current facility-administered medications on file prior to encounter.      Current Outpatient Medications on File Prior to Encounter   Medication Sig    albuterol (PROVENTIL HFA) 90 mcg/actuation inhaler Inhale 2 puffs into the lungs every 6 (six) hours as needed for Wheezing. Rescue    aspirin (ECOTRIN) 81 MG EC tablet Take 81 mg by mouth once daily.    atorvastatin (LIPITOR) 20 MG tablet Take 20 mg by mouth once daily.    calcium carbonate (OS-MUNIRA) 600 mg calcium (1,500 mg) Tab Take 600 mg by mouth once.    calcium-vitamin D3 500 mg(1,250mg) -200 unit per tablet Take 1 tablet by mouth 2 (two) times daily with meals.    cyanocobalamin (VITAMIN B-12) 1000 MCG tablet Take 100 mcg by mouth once daily.    folic acid (FOLVITE) 400 MCG tablet Take 400 mcg by mouth once daily.    ginkgo biloba 40 mg Tab Take by mouth.    ibuprofen (ADVIL,MOTRIN) 600 MG tablet Take 600 mg by mouth every 6 (six) hours as needed for Pain.    icosapent ethyl (VASCEPA) 1 gram Cap Take 1 tablet by mouth 2 (two) times daily.    Lactobac no.41-Bifidobact no.7 (PROBIOTIC-10) 70 mg (3 billion cell) Cap Take by mouth.    latanoprost 0.005 % ophthalmic solution 1 drop every evening.    losartan (COZAAR) 50 MG tablet Take 50 mg by mouth once daily.    METFORMIN/AA 7/WBCO300/CHOLINE (METFORMIN-AA7-[id number] ORAL) Take by mouth.    multivitamin-Ca-iron-minerals 18-0.4 mg Tab Take 1 tablet by mouth.    nabumetone (RELAFEN) 750 MG tablet Take 750 mg by mouth 2 (two) times daily.    omega 3,6,9 combination  no.7 92 mg (43 mg-22 [id number]) Chew Take by mouth.    omeprazole-sodium bicarbonate (ZEGERID) 40-1.1 mg-gram per capsule Take 1 capsule by mouth before breakfast.    sertraline (ZOLOFT) 100 MG tablet Take 100 mg by mouth once daily.    simvastatin (ZOCOR) 40 MG tablet Take 40 mg by mouth every evening.     Family History     None        Tobacco Use    Smoking status: Light Tobacco Smoker     Types: Vaping w/o nicotine    Smokeless tobacco: Never Used   Substance and Sexual Activity    Alcohol use: Yes    Drug use: No    Sexual activity: Not on file     Review of Systems   Constitutional: Positive for appetite change and fatigue. Negative for chills, diaphoresis and fever.   HENT: Negative for sore throat and trouble swallowing.    Eyes: Negative for photophobia and visual disturbance.   Respiratory: Positive for shortness of breath (exertional). Negative for cough and wheezing.    Cardiovascular: Negative for chest pain, palpitations and leg swelling.   Gastrointestinal: Positive for nausea and vomiting. Negative for abdominal distention, abdominal pain and diarrhea.   Genitourinary: Negative for dysuria and hematuria.        Darker urine   Musculoskeletal: Negative for neck pain and neck stiffness.   Skin: Negative for rash and wound.   Neurological: Positive for weakness (generalized). Negative for seizures, syncope, numbness and headaches.   Psychiatric/Behavioral: Negative for confusion and decreased concentration.     Objective:     Vital Signs (Most Recent):  Temp: 97.5 °F (36.4 °C) (10/30/20 1257)  Pulse: (!) 52 (10/31/20 0102)  Resp: 11 (10/31/20 0102)  BP: (!) 149/70 (10/31/20 0102)  SpO2: 99 % (10/31/20 0102) Vital Signs (24h Range):  Temp:  [97.5 °F (36.4 °C)] 97.5 °F (36.4 °C)  Pulse:  [52-82] 52  Resp:  [11-17] 11  SpO2:  [98 %-99 %] 99 %  BP: (137-164)/(66-76) 149/70     Weight: 63.5 kg (140 lb)  Body mass index is 25.61 kg/m².    Physical Exam  Constitutional:       General: She is not in  acute distress.     Appearance: She is not toxic-appearing.   HENT:      Head: Normocephalic and atraumatic.      Nose: Nose normal.   Eyes:      General: No scleral icterus.     Extraocular Movements: Extraocular movements intact.      Pupils: Pupils are equal, round, and reactive to light.   Cardiovascular:      Rate and Rhythm: Normal rate and regular rhythm.      Heart sounds: No murmur.   Pulmonary:      Effort: Pulmonary effort is normal. No respiratory distress.      Breath sounds: No wheezing or rales.   Abdominal:      General: Abdomen is flat. There is no distension.      Palpations: Abdomen is soft.      Tenderness: There is no abdominal tenderness. There is no guarding.   Musculoskeletal: Normal range of motion.      Right lower leg: No edema.      Left lower leg: No edema.   Skin:     General: Skin is warm and dry.   Neurological:      General: No focal deficit present.      Mental Status: She is alert and oriented to person, place, and time.   Psychiatric:         Mood and Affect: Mood normal.         Behavior: Behavior normal.           CRANIAL NERVES     CN III, IV, VI   Pupils are equal, round, and reactive to light.       Significant Labs:   CBC:   Recent Labs   Lab 10/30/20  1355 10/30/20  2027   WBC 8.56 7.25   HGB 12.5 11.9*   HCT 37.5 34.8*    261     CMP:   Recent Labs   Lab 10/30/20  1355   *   K 4.1   CL 94*   CO2 26   *   BUN 11   CREATININE 1.4   CALCIUM 9.2   PROT 6.3   ALBUMIN 3.4*   BILITOT 0.5   ALKPHOS 74   AST 25   ALT 12   ANIONGAP 15   EGFRNONAA 36.0*     Cardiac Markers:   Recent Labs   Lab 10/30/20  1355   BNP 63     Troponin:   Recent Labs   Lab 10/30/20  1355 10/30/20  1847   TROPONINI 0.024 0.028*     All pertinent labs within the past 24 hours have been reviewed.    Significant Imaging: I have reviewed all pertinent imaging results/findings within the past 24 hours.

## 2020-10-31 NOTE — CONSULTS
[facility]  Cardiology  Consult Note    Patient Name: Ayaka Ellison  MRN: [id number]  Admission Date: 10/30/2020  Hospital Length of Stay: 0 days  Code Status: No Order   Attending Provider: Manas Carr MD  Consulting Provider: Ayaka Altman MD  Primary Care Physician: Primary Doctor No  Principal Problem:<principal problem not specified>    Patient information was obtained from patient and ER records.     Inpatient consult to Cardiology  Consult performed by: Ayaka Altman MD  Consult ordered by: Manas Carr MD        Subjective:     Chief Complaint:  Abnormal EKG in patient who presents with nausea/vomiting     HPI: Ayaka Ellison is a 81 yo F with PMHx significant HTN, HLD, DM2 x 5 years, former tobacco use (1/2 ppd x 20 years) who presents to ED with c/o nausea/vomiting. Patient reports symptoms began approx 3 day ago and have been relatively constant / persistent since.  Also reports associated lightheadedness/dizziness and fatigue but no syncope. Due to persistent symptoms her daughter prompted patient to come to ED.     On full review of systems patient denies fever, chills, sweats. Does acknowledge new onset dyspnea on exertion that began approx 1 month ago. Claims she is unable to ambulate more than 10 feet w/o having to stop to catch her breath and has difficulty ambulating up flight of stairs at home w/o stopping now. She otherwise denies chest pain, orthopnea, pnd, peripheral swelling, or syncope. Further denies personal hx of CAD/MI, CVA/TIA, etc. FHx positive for HTN in her father but no known CAD, MI, CVA/TIA, etc.     On arrival to ED patient is afebrile and hemodynamically stable (symetrical /60s, HR 60s). Labs notable for CBC WNL. BUN 11, Cr 1.4. BNP 63. Initial trop negative 0.024 and second set borderline positive at 0.028. EKG shows NSR with ST depressions in inferolateral leads (II, III, aVF, V4-V6) for which Cardiology is consulted. No  prior labs or EKGs available in our system.      Past Medical History:   Diagnosis Date    Arthritis     Cataract     Glaucoma     Hyperlipidemia     Hypertension     Sleep apnea        Past Surgical History:   Procedure Laterality Date    APPENDECTOMY      BREAST SURGERY      right breast lumpectomy    CHOLECYSTECTOMY      HAND SURGERY Right        Review of patient's allergies indicates:   Allergen Reactions    Penicillins Itching    Codeine Nausea Only       (Not in a hospital admission)    Family History     None        Tobacco Use    Smoking status: Light Tobacco Smoker     Types: Vaping w/o nicotine    Smokeless tobacco: Never Used   Substance and Sexual Activity    Alcohol use: Yes    Drug use: No    Sexual activity: Not on file     Review of Systems   Constitution: Positive for malaise/fatigue. Negative for chills, decreased appetite, diaphoresis, fever, night sweats, weight gain and weight loss.   Eyes: Negative.    Cardiovascular: Positive for dyspnea on exertion. Negative for chest pain, irregular heartbeat, leg swelling, near-syncope, orthopnea, palpitations, paroxysmal nocturnal dyspnea and syncope.   Respiratory: Positive for shortness of breath. Negative for cough, sputum production and wheezing.    Endocrine: Negative.    Hematologic/Lymphatic: Negative for bleeding problem.   Skin: Negative for color change, flushing, rash and suspicious lesions.   Musculoskeletal: Negative.    Gastrointestinal: Positive for nausea and vomiting. Negative for bloating, abdominal pain, change in bowel habit, constipation, diarrhea, heartburn and melena.   Genitourinary: Negative for flank pain, frequency, hematuria, incomplete emptying and urgency.   Neurological: Negative for dizziness, focal weakness, headaches, light-headedness, numbness, paresthesias, seizures, sensory change and weakness.   Psychiatric/Behavioral: Negative for altered mental status. The patient is not nervous/anxious.       Objective:     Vital Signs (Most Recent):  Temp: 97.5 °F (36.4 °C) (10/30/20 1257)  Pulse: 68 (10/30/20 1714)  Resp: 16 (10/30/20 1712)  BP: (!) 140/68 (10/30/20 1714)  SpO2: 98 %(while walking around nursing station) (10/30/20 1855) Vital Signs (24h Range):  Temp:  [97.5 °F (36.4 °C)] 97.5 °F (36.4 °C)  Pulse:  [66-82] 68  Resp:  [16-17] 16  SpO2:  [98 %-99 %] 98 %  BP: (137-164)/(68-73) 140/68     Weight: 63.5 kg (140 lb)  Body mass index is 25.61 kg/m².    SpO2: 98 %(while walking around nursing station)  O2 Device (Oxygen Therapy): room air    No intake or output data in the 24 hours ending 10/30/20 2034    Lines/Drains/Airways     Peripheral Intravenous Line                 Peripheral IV - Single Lumen 10/30/20 2027 20 G Left Forearm less than 1 day                Physical Exam   Constitutional: She is oriented to person, place, and time. She appears well-developed and well-nourished. No distress.   HENT:   Head: Normocephalic and atraumatic.   Mouth/Throat: Oropharynx is clear and moist.   Eyes: Pupils are equal, round, and reactive to light. EOM are normal.   Neck: Normal range of motion. Neck supple. No JVD present.   Cardiovascular: Normal rate and regular rhythm. Exam reveals no gallop and no friction rub.   No murmur heard.  Pulses:       Radial pulses are 2+ on the right side and 2+ on the left side.        Dorsalis pedis pulses are 2+ on the right side and 2+ on the left side.        Posterior tibial pulses are 2+ on the right side and 2+ on the left side.   Pulmonary/Chest: Effort normal and breath sounds normal. No respiratory distress. She has no wheezes. She has no rales. She exhibits no tenderness.   Abdominal: Soft. Bowel sounds are normal. She exhibits no distension. There is no abdominal tenderness.   Musculoskeletal: Normal range of motion.         General: No edema.   Lymphadenopathy:     She has no cervical adenopathy.   Neurological: She is alert and oriented to person, place, and time.    Skin: Skin is warm and dry. No erythema.   Psychiatric: She has a normal mood and affect. Her behavior is normal. Judgment and thought content normal.       Significant Labs: All pertinent lab results from the last 24 hours have been reviewed.    Significant Imaging: Reviewed in EPIC.    Assessment and Plan:     1) Abnormal EKG  2) Elevated troponin  3) Nausea/vomiting   4) Dyspnea on exertion     This is a 79 yo F with PMHx DM, HTN, HLD, former tobacco use who presents to ED with nausea/vomiting symptoms accompanied by fatigue and reported new osnet dyspnea on exertion for past 1 month.     Work up in ED notable for borderline positive troponin (0.028) and ST depressions on EKG in inferolateral distribution.     Despite atypical symptoms - given patient's age, multiple risk factors, and abnormal EKG, would treat her as possible ACS (UA/NSTEMI).    Plan:  - admit to hospital medicine w/ tele monitoring   - recommend initiating ACS protocol   - load with aspirin 325 mg x1, then 81 mg daily  - load with brilinta 180 mg x1, then 90 mg b.i.d.   - initiate heparin gtt x 48 hours  - recommend high intensity statin therapy (lipitor 40 vs 80 mg daily)  - recommend initiating low dose BB if HR/BP allow, titrate up as able to HR ~ 60s  - bp control < 130/80 mmhg  - trend troponin until peak   - 2d echocardiogram in a.m.   - update modifying risk factors (check fasting lipid panel, Hba1c, tsh/ft4)  - cardiology will follow up in a.m., please notify on call cardiology fellow for any significant changes in patient's clinical status (i.e. evolution of ST-elevations on EKG, refractory chest pain to nitro, electrical or hemodynamic instability, etc)      VTE Risk Mitigation (From admission, onward)         Ordered     heparin 25,000 units in dextrose 5% (100 units/ml) IV bolus from bag - ADDITIONAL PRN BOLUS - 60 units/kg (max bolus 4000 units)  As needed (PRN)     Question:  Heparin Infusion Adjustment (DO NOT MODIFY ANSWER)   Answer:  [email or web address]\[email or web address]\Images\Pharmacy\HeparinInfusions\heparin LOW INTENSITY nomogram for [facility] [id number]    10/30/20 2017     heparin 25,000 units in dextrose 5% (100 units/ml) IV bolus from bag - ADDITIONAL PRN BOLUS - 30 units/kg (max bolus 4000 units)  As needed (PRN)     Question:  Heparin Infusion Adjustment (DO NOT MODIFY ANSWER)  Answer:  [email or web address]\epic\Images\Pharmacy\HeparinInfusions\heparin LOW INTENSITY nomogram for [facility] [id number]    10/30/20 2017     heparin 25,000 units in dextrose 5% 250 mL (100 units/mL) infusion LOW INTENSITY nomogram - [facility]  Continuous     Question:  Heparin Infusion Adjustment (DO NOT MODIFY ANSWER)  Answer:  [email or web address]\epic\Images\Pharmacy\HeparinInfusions\heparin LOW INTENSITY nomogram for [facility] [id number]    10/30/20 2017     heparin 25,000 units in dextrose 5% (100 units/ml) IV bolus from bag INITIAL BOLUS (max bolus 4000 units)  Once     Question:  Heparin Infusion Adjustment (DO NOT MODIFY ANSWER)  Answer:  [email or web address]\epic\Images\Pharmacy\HeparinInfusions\heparin LOW INTENSITY nomogram for [facility] [id number]    10/30/20 2017                Thank you for your consult. I will follow-up with patient. Please contact us if you have any additional questions.    Ayaka Altman MD  Cardiology Department   [facility]

## 2020-10-31 NOTE — ASSESSMENT & PLAN NOTE
- Cardiology consulted and following patient  ;  Appreciate recs  - load with aspirin 325 mg x1, then 81 mg daily  - load with brilinta 180 mg x1, then 90 mg b.i.d.   - initiate heparin gtt x 48 hours  - Start Lipitor 40mg daily  - Start Metoprolol 12.5mg BID  - hold home losartan while monitoring response to initiation of BB. Restart as appropriate  - trend troponin until peak   - ECHO pending  -  lipid panel, Hba1c, tsh in am

## 2020-10-31 NOTE — ED NOTES
Lab confirmed appropriate tubes for lipid panel and hgbA1c already in lab, will change to add-on orders.

## 2020-10-31 NOTE — ED NOTES
[facility] tele box [phone] admitted to pt. Will call back shortly to confirm monitoring.

## 2020-10-31 NOTE — ED NOTES
Attempted to give report to [facility], informed they cannot take pt on heparin gtt. Will notify ED charge.

## 2020-10-31 NOTE — PT/OT/SLP EVAL
Occupational Therapy   Evaluation and Discharge Note    Name: Ayaka Ellison  MRN: [id number]  Admitting Diagnosis:  ACS (acute coronary syndrome)      Recommendations:     Discharge Recommendations: home  Discharge Equipment Recommendations:  none  Barriers to discharge:  None    Assessment:     Ayaka Ellison is a 80 y.o. female with a medical diagnosis of ACS (acute coronary syndrome). At this time, patient is functioning at their prior level of function and does not require further acute OT services.     Plan:     During this hospitalization, patient does not require further acute OT services.  Please re-consult if situation changes.    · Plan of Care Reviewed with: patient, daughter    Subjective     Chief Complaint: ACS  Patient/Family Comments/goals: To get better.    Occupational Profile:  Living Environment: Pt lives in a one story house c 3 LAUREN and B rails which are wide enough to where she can reach both sides.  Has a tub/shower combo.  Previous level of function: I PTA  Roles and Routines: Retired  Equipment Used at home:  none  Assistance upon Discharge: Daughter    Pain/Comfort:  · Pain Rating 1: 0/10    Patients cultural, spiritual, Zoroastrianism conflicts given the current situation: no    Objective:     Communicated with: RN prior to session.  Patient found supine with peripheral IV, telemetry(Visi monitor) upon OT entry to room.    General Precautions: Standard, fall   Orthopedic Precautions:N/A   Braces: N/A     Occupational Performance:    Bed Mobility:    · Patient completed Supine to Sit with independence  · Patient completed Sit to Supine with independence    Functional Mobility/Transfers:  · Patient completed Sit <> Stand Transfer with stand by assistance  with  no assistive device   · Patient completed Toilet Transfer Stand Pivot technique with stand by assistance with  no AD  · Patient completed  Shower Transfer Stand Pivot technique with stand by assistance with no AD  · Functional  Mobility: Pt was able to walk to bathroom c SBA.    Activities of Daily Living:  · Upper Body Dressing: stand by assistance to don hospital gown.  · Lower Body Dressing: modified independence to don/doff socks.    Physical Exam:  Upper Extremity Range of Motion:     -       Right Upper Extremity: WFL  -       Left Upper Extremity: WFL  Upper Extremity Strength:    -       Right Upper Extremity: WFL  -       Left Upper Extremity: WFL    AMPAC 6 Click ADL:  AMPAC Total Score: 24  Education:    Patient left supine with all lines intact, call button in reach, RN notified and daughter present    GOALS:   Multidisciplinary Problems     Occupational Therapy Goals     Not on file          Multidisciplinary Problems (Resolved)        Problem: Occupational Therapy Goal    Goal Priority Disciplines Outcome Interventions   Occupational Therapy Goal   (Resolved)     OT, PT/OT Met                    History:     Past Medical History:   Diagnosis Date    Arthritis     Cataract     Glaucoma     Hyperlipidemia     Hypertension     Sleep apnea        Past Surgical History:   Procedure Laterality Date    APPENDECTOMY      BREAST SURGERY      right breast lumpectomy    CHOLECYSTECTOMY      HAND SURGERY Right        Time Tracking:     OT Date of Treatment: 10/31/20  OT Start Time: 1020  OT Stop Time: 1036  OT Total Time (min): 16 min    Billable Minutes:Evaluation 8  Self Care/Home Management 8    PINKY Ch  10/31/2020

## 2020-10-31 NOTE — SUBJECTIVE & OBJECTIVE
Past Medical History:   Diagnosis Date    Arthritis     Cataract     Glaucoma     Hyperlipidemia     Hypertension     Sleep apnea        Past Surgical History:   Procedure Laterality Date    APPENDECTOMY      BREAST SURGERY      right breast lumpectomy    CHOLECYSTECTOMY      HAND SURGERY Right        Review of patient's allergies indicates:   Allergen Reactions    Penicillins Itching    Codeine Nausea Only       (Not in a hospital admission)    Family History     None        Tobacco Use    Smoking status: Light Tobacco Smoker     Types: Vaping w/o nicotine    Smokeless tobacco: Never Used   Substance and Sexual Activity    Alcohol use: Yes    Drug use: No    Sexual activity: Not on file     Review of Systems   Constitution: Positive for malaise/fatigue. Negative for chills, decreased appetite, diaphoresis, fever, night sweats, weight gain and weight loss.   Eyes: Negative.    Cardiovascular: Positive for dyspnea on exertion. Negative for chest pain, irregular heartbeat, leg swelling, near-syncope, orthopnea, palpitations, paroxysmal nocturnal dyspnea and syncope.   Respiratory: Positive for shortness of breath. Negative for cough, sputum production and wheezing.    Endocrine: Negative.    Hematologic/Lymphatic: Negative for bleeding problem.   Skin: Negative for color change, flushing, rash and suspicious lesions.   Musculoskeletal: Negative.    Gastrointestinal: Positive for nausea and vomiting. Negative for bloating, abdominal pain, change in bowel habit, constipation, diarrhea, heartburn and melena.   Genitourinary: Negative for flank pain, frequency, hematuria, incomplete emptying and urgency.   Neurological: Negative for dizziness, focal weakness, headaches, light-headedness, numbness, paresthesias, seizures, sensory change and weakness.   Psychiatric/Behavioral: Negative for altered mental status. The patient is not nervous/anxious.      Objective:     Vital Signs (Most Recent):  Temp: 97.5  °F (36.4 °C) (10/30/20 1257)  Pulse: 68 (10/30/20 1714)  Resp: 16 (10/30/20 1712)  BP: (!) 140/68 (10/30/20 1714)  SpO2: 98 %(while walking around nursing station) (10/30/20 1855) Vital Signs (24h Range):  Temp:  [97.5 °F (36.4 °C)] 97.5 °F (36.4 °C)  Pulse:  [66-82] 68  Resp:  [16-17] 16  SpO2:  [98 %-99 %] 98 %  BP: (137-164)/(68-73) 140/68     Weight: 63.5 kg (140 lb)  Body mass index is 25.61 kg/m².    SpO2: 98 %(while walking around nursing station)  O2 Device (Oxygen Therapy): room air    No intake or output data in the 24 hours ending 10/30/20 2034    Lines/Drains/Airways     Peripheral Intravenous Line                 Peripheral IV - Single Lumen 10/30/20 2027 20 G Left Forearm less than 1 day                Physical Exam   Constitutional: She is oriented to person, place, and time. She appears well-developed and well-nourished. No distress.   HENT:   Head: Normocephalic and atraumatic.   Mouth/Throat: Oropharynx is clear and moist.   Eyes: Pupils are equal, round, and reactive to light. EOM are normal.   Neck: Normal range of motion. Neck supple. No JVD present.   Cardiovascular: Normal rate and regular rhythm. Exam reveals no gallop and no friction rub.   No murmur heard.  Pulses:       Radial pulses are 2+ on the right side and 2+ on the left side.        Dorsalis pedis pulses are 2+ on the right side and 2+ on the left side.        Posterior tibial pulses are 2+ on the right side and 2+ on the left side.   Pulmonary/Chest: Effort normal and breath sounds normal. No respiratory distress. She has no wheezes. She has no rales. She exhibits no tenderness.   Abdominal: Soft. Bowel sounds are normal. She exhibits no distension. There is no abdominal tenderness.   Musculoskeletal: Normal range of motion.         General: No edema.   Lymphadenopathy:     She has no cervical adenopathy.   Neurological: She is alert and oriented to person, place, and time.   Skin: Skin is warm and dry. No erythema.   Psychiatric:  She has a normal mood and affect. Her behavior is normal. Judgment and thought content normal.       Significant Labs: All pertinent lab results from the last 24 hours have been reviewed.    Significant Imaging: Reviewed in EPIC.

## 2020-10-31 NOTE — DISCHARGE INSTRUCTIONS
Consider taking your magnesium pills 12 hrs apart to give your body a chance to observe them.    Return to the emergency department for any concerning symptoms.

## 2020-10-31 NOTE — H&P
[facility] Medicine  History & Physical    Patient Name: Ayaka Ellison  MRN: [id number]  Admission Date: 10/30/2020  Attending Physician: Danielle Jones MD   Primary Care Provider: Primary Doctor [facility] Medicine Team: [facility]  Juan Carlos Boo MD     Patient information was obtained from patient, past medical records and ER records.     Subjective:     Principal Problem:ACS (acute coronary syndrome)    Chief Complaint:   Chief Complaint   Patient presents with    Nausea     c/o N/V onset few hours ago, also c/o SOB        HPI: This is a 79yo female with a past medical history of HTN, HLD, DM2 x 5 years, former tobacco use (1/2 ppd x 20 years) who presents to the ED with chief complaint of nausea, vomiting, and exertional dyspnea. Patient reports that she has been experiencing progressively worsening exertional dyspnea for the last two weeks and also reports decreased appetite and po intake during this time. She states that three days ago she also developed new symptoms of nausea and vomiting and so she subsequently presented to the ED for further evaluation. In the ED patient afebrile and hemodynamically stable. UA consistent with UTI. BNP 63. Initial trop negative 0.024 and second set borderline positive at 0.028. EKG shows NSR with ST depressions in inferolateral leads (II, III, aVF, V4-V6) for which Cardiology is consulted. Cardiology saw and evaluated patient and concerned for ACS (UA/NSTEMI). Patient loaded with ASA and ticagrelor and started on hep gtt. She has also been started on ceftriaxone for UTI. She has been admitted to the care of medicine for further evaluation and management    Past Medical History:   Diagnosis Date    Arthritis     Cataract     Glaucoma     Hyperlipidemia     Hypertension     Sleep apnea        Past Surgical History:   Procedure Laterality Date    APPENDECTOMY      BREAST SURGERY      right breast lumpectomy     CHOLECYSTECTOMY      HAND SURGERY Right        Review of patient's allergies indicates:   Allergen Reactions    Penicillins Itching    Codeine Nausea Only       No current facility-administered medications on file prior to encounter.      Current Outpatient Medications on File Prior to Encounter   Medication Sig    albuterol (PROVENTIL HFA) 90 mcg/actuation inhaler Inhale 2 puffs into the lungs every 6 (six) hours as needed for Wheezing. Rescue    aspirin (ECOTRIN) 81 MG EC tablet Take 81 mg by mouth once daily.    atorvastatin (LIPITOR) 20 MG tablet Take 20 mg by mouth once daily.    calcium carbonate (OS-MUNIRA) 600 mg calcium (1,500 mg) Tab Take 600 mg by mouth once.    calcium-vitamin D3 500 mg(1,250mg) -200 unit per tablet Take 1 tablet by mouth 2 (two) times daily with meals.    cyanocobalamin (VITAMIN B-12) 1000 MCG tablet Take 100 mcg by mouth once daily.    folic acid (FOLVITE) 400 MCG tablet Take 400 mcg by mouth once daily.    ginkgo biloba 40 mg Tab Take by mouth.    ibuprofen (ADVIL,MOTRIN) 600 MG tablet Take 600 mg by mouth every 6 (six) hours as needed for Pain.    icosapent ethyl (VASCEPA) 1 gram Cap Take 1 tablet by mouth 2 (two) times daily.    Lactobac no.41-Bifidobact no.7 (PROBIOTIC-10) 70 mg (3 billion cell) Cap Take by mouth.    latanoprost 0.005 % ophthalmic solution 1 drop every evening.    losartan (COZAAR) 50 MG tablet Take 50 mg by mouth once daily.    METFORMIN/AA 7/VRQY838/CHOLINE (METFORMIN-AA7-[id number]-FKINSW ORAL) Take by mouth.    multivitamin-Ca-iron-minerals 18-0.4 mg Tab Take 1 tablet by mouth.    nabumetone (RELAFEN) 750 MG tablet Take 750 mg by mouth 2 (two) times daily.    omega 3,6,9 combination no.7 92 mg (43 mg-22 [id number]) Chew Take by mouth.    omeprazole-sodium bicarbonate (ZEGERID) 40-1.1 mg-gram per capsule Take 1 capsule by mouth before breakfast.    sertraline (ZOLOFT) 100 MG tablet Take 100 mg by mouth once daily.    simvastatin (ZOCOR) 40  MG tablet Take 40 mg by mouth every evening.     Family History     None        Tobacco Use    Smoking status: Light Tobacco Smoker     Types: Vaping w/o nicotine    Smokeless tobacco: Never Used   Substance and Sexual Activity    Alcohol use: Yes    Drug use: No    Sexual activity: Not on file     Review of Systems   Constitutional: Positive for appetite change and fatigue. Negative for chills, diaphoresis and fever.   HENT: Negative for sore throat and trouble swallowing.    Eyes: Negative for photophobia and visual disturbance.   Respiratory: Positive for shortness of breath (exertional). Negative for cough and wheezing.    Cardiovascular: Negative for chest pain, palpitations and leg swelling.   Gastrointestinal: Positive for nausea and vomiting. Negative for abdominal distention, abdominal pain and diarrhea.   Genitourinary: Negative for dysuria and hematuria.        Darker urine   Musculoskeletal: Negative for neck pain and neck stiffness.   Skin: Negative for rash and wound.   Neurological: Positive for weakness (generalized). Negative for seizures, syncope, numbness and headaches.   Psychiatric/Behavioral: Negative for confusion and decreased concentration.     Objective:     Vital Signs (Most Recent):  Temp: 97.5 °F (36.4 °C) (10/30/20 1257)  Pulse: (!) 52 (10/31/20 0102)  Resp: 11 (10/31/20 0102)  BP: (!) 149/70 (10/31/20 0102)  SpO2: 99 % (10/31/20 0102) Vital Signs (24h Range):  Temp:  [97.5 °F (36.4 °C)] 97.5 °F (36.4 °C)  Pulse:  [52-82] 52  Resp:  [11-17] 11  SpO2:  [98 %-99 %] 99 %  BP: (137-164)/(66-76) 149/70     Weight: 63.5 kg (140 lb)  Body mass index is 25.61 kg/m².    Physical Exam  Constitutional:       General: She is not in acute distress.     Appearance: She is not toxic-appearing.   HENT:      Head: Normocephalic and atraumatic.      Nose: Nose normal.   Eyes:      General: No scleral icterus.     Extraocular Movements: Extraocular movements intact.      Pupils: Pupils are equal,  round, and reactive to light.   Cardiovascular:      Rate and Rhythm: Normal rate and regular rhythm.      Heart sounds: No murmur.   Pulmonary:      Effort: Pulmonary effort is normal. No respiratory distress.      Breath sounds: No wheezing or rales.   Abdominal:      General: Abdomen is flat. There is no distension.      Palpations: Abdomen is soft.      Tenderness: There is no abdominal tenderness. There is no guarding.   Musculoskeletal: Normal range of motion.      Right lower leg: No edema.      Left lower leg: No edema.   Skin:     General: Skin is warm and dry.   Neurological:      General: No focal deficit present.      Mental Status: She is alert and oriented to person, place, and time.   Psychiatric:         Mood and Affect: Mood normal.         Behavior: Behavior normal.           CRANIAL NERVES     CN III, IV, VI   Pupils are equal, round, and reactive to light.       Significant Labs:   CBC:   Recent Labs   Lab 10/30/20  1355 10/30/20  2027   WBC 8.56 7.25   HGB 12.5 11.9*   HCT 37.5 34.8*    261     CMP:   Recent Labs   Lab 10/30/20  1355   *   K 4.1   CL 94*   CO2 26   *   BUN 11   CREATININE 1.4   CALCIUM 9.2   PROT 6.3   ALBUMIN 3.4*   BILITOT 0.5   ALKPHOS 74   AST 25   ALT 12   ANIONGAP 15   EGFRNONAA 36.0*     Cardiac Markers:   Recent Labs   Lab 10/30/20  1355   BNP 63     Troponin:   Recent Labs   Lab 10/30/20  1355 10/30/20  1847   TROPONINI 0.024 0.028*     All pertinent labs within the past 24 hours have been reviewed.    Significant Imaging: I have reviewed all pertinent imaging results/findings within the past 24 hours.    Assessment/Plan:     * ACS (acute coronary syndrome)  - Cardiology consulted and following patient  ;  Appreciate recs  - load with aspirin 325 mg x1, then 81 mg daily  - load with brilinta 180 mg x1, then 90 mg b.i.d.   - initiate heparin gtt x 48 hours  - Start Lipitor 40mg daily  - Start Metoprolol 12.5mg BID  - hold home losartan while  monitoring response to initiation of BB. Restart as appropriate  - trend troponin until peak   - ECHO pending  -  lipid panel, Hba1c, tsh in am      Diabetes  - Hold home metformin  - SSI  - hypoglycemic protocol      Acute cystitis  - continue Ceftriaxone  - follow up urine culture        VTE Risk Mitigation (From admission, onward)         Ordered     heparin 25,000 units in dextrose 5% (100 units/ml) IV bolus from bag - ADDITIONAL PRN BOLUS - 60 units/kg (max bolus 4000 units)  As needed (PRN)     Question:  Heparin Infusion Adjustment (DO NOT MODIFY ANSWER)  Answer:  \\[email or web address]\epic\Images\Pharmacy\HeparinInfusions\heparin LOW INTENSITY nomogram for [facility] [id number].pdf    10/30/20 2017     heparin 25,000 units in dextrose 5% (100 units/ml) IV bolus from bag - ADDITIONAL PRN BOLUS - 30 units/kg (max bolus 4000 units)  As needed (PRN)     Question:  Heparin Infusion Adjustment (DO NOT MODIFY ANSWER)  Answer:  \\[employer]\epic\Images\Pharmacy\HeparinInfusions\heparin LOW INTENSITY nomogram for [facility] [id number].pdf    10/30/20 2017     heparin 25,000 units in dextrose 5% 250 mL (100 units/mL) infusion LOW INTENSITY nomogram - [facility]  Continuous     Question:  Heparin Infusion Adjustment (DO NOT MODIFY ANSWER)  Answer:  \\[employer]\epic\Images\Pharmacy\HeparinInfusions\heparin LOW INTENSITY nomogram for [facility] [id number].pdf    10/30/20 2017     IP VTE HIGH RISK PATIENT  Once      10/30/20 2131     Place sequential compression device  Until discontinued      10/30/20 2131     Place QUINCY hose  Until discontinued      10/30/20 2112                   Juan Carlos Boo MD  Department of Hospital Medicine   [facility]

## 2020-10-31 NOTE — PLAN OF CARE
Cardiology Update    Patient admitted for NSTEMI. BAL score of 4 (patient has 3 CAD risk factors including: HTN/HLP/DM2/tobacco use, positive cardiac biomarkers, age >65 and EKG changes). Started on ACS protocol (loaded with DAPT, on statin therapy and systemic heparin). Awaiting TTE today. Recommend to serially trend troponin to peak. Interventional Cardiology consult placed.     Leyda Mcgee, PGY-6 (Cardiology Fellow)

## 2020-10-31 NOTE — PT/OT/SLP EVAL
"Physical Therapy Evaluation    Patient Name:  Ayaka Ellison   MRN:  [id number]    Recommendations:     Discharge Recommendations:  home   Discharge Equipment Recommendations: none   Barriers to discharge: None    Assessment:     Ayaka Ellison is a 80 y.o. female admitted with a medical diagnosis of ACS (acute coronary syndrome).  She presents with the following impairments/functional limitations:  impaired endurance, weakness. No PT needs after DC from hospital.    Rehab Prognosis: Good; patient would benefit from acute skilled PT services to address these deficits and reach maximum level of function.    Recent Surgery: * No surgery found *      Plan:     During this hospitalization, patient to be seen 3 x/week to address the identified rehab impairments via gait training, therapeutic activities, therapeutic exercises, neuromuscular re-education and progress toward the following goals:    · Plan of Care Expires:  11/30/20    Subjective     Chief Complaint: none verbalized  Patient/Family Comments/goals: "i'm feeling much better than before I came here"  Pain/Comfort:  Pain Rating 1: 0/10  Pain Rating Post-Intervention 1: 0/10    Patients cultural, spiritual, Buddhism conflicts given the current situation: no    Living Environment:  Pt lives alone in a [facility] with 3STE and B HRs.   Prior to admission, patients level of function was Pt is independent for all functional mobility and ADLs.  Equipment used at home: glucose monitor and blood pressure machine.  DME owned (not currently used): none.  Upon discharge, patient will have assistance from daughter.    Objective:     Communicated with RN prior to session.  Patient found HOB elevated with telemetry, pulse ox (continuous), peripheral IV  upon PT entry to room.    General Precautions: Standard, fall   Orthopedic Precautions:N/A   Braces: N/A     Exams:  · Cognitive Exam:  Patient is oriented to Person, Place, Time and Situation  · Fine Motor Coordination:  " BLE, heel/shin, WFL  · Gross Motor Coordination:  WFL  · Postural Exam:  Patient presented with the following abnormalities:    · -       Rounded shoulders  · Sensation:  BLE, LT, WFL  · RLE ROM: WFL  · RLE Strength: grossly 4/5 to 5/5 except hip flexion 3/5  · LLE ROM: WFL  · LLE Strength: grossly 4/5 to 5/5 except hip flexion 3/5    Functional Mobility:  · Bed Mobility:     · Scooting: supervision  · Supine to Sit: supervision  · Sit to Supine: supervision  · Transfers:     · Sit to Stand:  stand by assistance with no AD  · Gait: 30 ft, No AD, SBA, no LOBs or unsteadiness noted  · Balance:  Supervision to SBA for all functional mobility    Therapeutic Activities and Exercises:  Patient educated on role of therapy, goals of session, and benefits of mobilizing.   Discussed PT plan of care during hospitalization.   Patient educated on calling for assistance.   Patient educated on how their diagnosis impacts their mobility within PT scope of practice.   Communication board up to date.  All questions answered within PT scope of practice.      AM-PAC 6 CLICK MOBILITY  Total Score:18     Patient left HOB elevated with all lines intact, call button in reach, RN notified and daughter present.    GOALS:   Multidisciplinary Problems     Physical Therapy Goals        Problem: Physical Therapy Goal    Goal Priority Disciplines Outcome Goal Variances Interventions   Physical Therapy Goal     PT, PT/OT Ongoing, Progressing     Description: Goals to be met by: 2020     Patient will increase functional independence with mobility by performin. Supine to sit with [place]  2. Sit to supine with [place]  3. Sit to stand transfer with [place]  4. Gait  x 250 feet with [place] using no AD.   5. Ascend/descend 3 stair with bilateral Handrails Supervision using no AD.   6. Lower extremity exercise program x15 reps per handout, with supervision.                     History:     Past Medical History:    Diagnosis Date    Arthritis     Cataract     Glaucoma     Hyperlipidemia     Hypertension     Sleep apnea        Past Surgical History:   Procedure Laterality Date    APPENDECTOMY      BREAST SURGERY      right breast lumpectomy    CHOLECYSTECTOMY      HAND SURGERY Right        Time Tracking:     PT Received On: 10/31/20  PT Start Time: 1505     PT Stop Time: 1518  PT Total Time (min): 13 min     Billable Minutes: Evaluation 13      Danna Lozada, PT  10/31/2020

## 2020-10-31 NOTE — PLAN OF CARE
Problem: Fall Injury Risk  Goal: Absence of Fall and Fall-Related Injury  Outcome: Ongoing, Progressing     Problem: Adult Inpatient Plan of Care  Goal: Plan of Care Review  Outcome: Ongoing, Progressing  Goal: Patient-Specific Goal (Individualization)  Outcome: Ongoing, Progressing  Goal: Absence of Hospital-Acquired Illness or Injury  Outcome: Ongoing, Progressing  Goal: Optimal Comfort and Wellbeing  Outcome: Ongoing, Progressing  Goal: Readiness for Transition of Care  Outcome: Ongoing, Progressing  Goal: Rounds/Family Conference  Outcome: Ongoing, Progressing   Patient alert and oriented. VS remain within pt range. No significant health changes to report.  No falls or injuries during shift.

## 2020-10-31 NOTE — HPI
Ayaka Ellison is a 79 yo F with PMHx significant HTN, HLD, DM2 x 5 years, former tobacco use (1/2 ppd x 20 years) who presented to ED on 10/30/20 with c/o nausea/vomiting. Patient reports symptoms began approx 3 day ago and have been relatively constant.  Also reports associated lightheadedness/dizziness and fatigue. On full review of systems acknowledges new onset dyspnea on exertion that began approx 1 month ago. Claims she is unable to ambulate more than 10 feet w/o having to stop to catch her breath and has difficulty ascending flight of stairs at home w/o having to stop. She otherwise denies chest pain, orthopnea, pnd, peripheral swelling, or syncope. Further denies personal hx of CAD/MI, CVA/TIA, PAD, etc.     On arrival to ED patient afebrile and hemodynamically stable (symetrical /60s, HR 60s).     Labs notable for BNP 63 and uptrending TnI 0.024 --> 0.028 --> 0.054 (peaked).     EKG NSR with ST depressions in inferolateral leads (II, III, aVF, V4-V6).     Echo with preserved LV systolic function, mild AI, mild-mod MR, no WMAs.    Patient was started on ACS protocol - loaded with ASA/Brilinta and heparin infusion.

## 2020-10-31 NOTE — HPI
This is a 79yo female with a past medical history of HTN, HLD, DM2 x 5 years, former tobacco use (1/2 ppd x 20 years) who presents to the ED with chief complaint of nausea, vomiting, and exertional dyspnea. Patient reports that she has been experiencing progressively worsening exertional dyspnea for the last two weeks and also reports decreased appetite and po intake during this time. She states that three days ago she also developed new symptoms of nausea and vomiting and so she subsequently presented to the ED for further evaluation. In the ED patient afebrile and hemodynamically stable. UA consistent with UTI. BNP 63. Initial trop negative 0.024 and second set borderline positive at 0.028. EKG shows NSR with ST depressions in inferolateral leads (II, III, aVF, V4-V6) for which Cardiology is consulted. Cardiology saw and evaluated patient and concerned for ACS (UA/NSTEMI). Patient loaded with ASA and ticagrelor and started on hep gtt. She has also been started on ceftriaxone for UTI. She has been admitted to the care of medicine for further evaluation and management

## 2020-10-31 NOTE — PLAN OF CARE
Seen this AM at bedside, no worsening of dyspnea shes had for a few days, no chest pain or pressure. sstill nausea unrelieved with zofran, phenergan added. Trop plateau now, on hep drip, and acs protocols, watching HR on low dose toprol with HRs 60s. Added back home losartan for better BP control   Echo pending  Urine CX pending  Updated her on all the planning now for above and possible cath to assess last week as symptoms and EKG are consistent  With cardiac event/ACS for which cards is following. All questions answered. If changing symptoms or pain or pressure starts would repeat eKG to ensure no changes as had ST segment changes on admit to ensure no dynamic issue. She said she will let us know.

## 2020-10-31 NOTE — PLAN OF CARE
PT Eval complete and POC established.    Danna Lozada, PT, DPT  10/31/2020      Problem: Physical Therapy Goal  Goal: Physical Therapy Goal  Description: Goals to be met by: 2020     Patient will increase functional independence with mobility by performin. Supine to sit with [place]  2. Sit to supine with [place]  3. Sit to stand transfer with [place]  4. Gait  x 250 feet with [place] using no AD.   5. Ascend/descend 3 stair with bilateral Handrails Supervision using no AD.   6. Lower extremity exercise program x15 reps per handout, with supervision.    Outcome: Ongoing, Progressing

## 2020-10-31 NOTE — PLAN OF CARE
10/31/20 1253   Post-Acute Status   Post-Acute Authorization Other   Other Status No Post-Acute Service Needs

## 2020-11-01 LAB
ALBUMIN SERPL BCP-MCNC: 2.5 G/DL (ref 3.5–5.2)
ALP SERPL-CCNC: 57 U/L (ref 55–135)
ALT SERPL W/O P-5'-P-CCNC: 10 U/L (ref 10–44)
ANION GAP SERPL CALC-SCNC: 11 MMOL/L (ref 8–16)
APTT BLDCRRT: 32.2 SEC (ref 21–32)
APTT BLDCRRT: 38.5 SEC (ref 21–32)
APTT BLDCRRT: 47.2 SEC (ref 21–32)
APTT BLDCRRT: 91.7 SEC (ref 21–32)
AST SERPL-CCNC: 25 U/L (ref 10–40)
BASOPHILS # BLD AUTO: 0.03 K/UL (ref 0–0.2)
BASOPHILS NFR BLD: 0.6 % (ref 0–1.9)
BILIRUB SERPL-MCNC: 0.3 MG/DL (ref 0.1–1)
BUN SERPL-MCNC: 6 MG/DL (ref 8–23)
CALCIUM SERPL-MCNC: 7.6 MG/DL (ref 8.7–10.5)
CHLORIDE SERPL-SCNC: 100 MMOL/L (ref 95–110)
CO2 SERPL-SCNC: 24 MMOL/L (ref 23–29)
CREAT SERPL-MCNC: 0.9 MG/DL (ref 0.5–1.4)
DIFFERENTIAL METHOD: ABNORMAL
EOSINOPHIL # BLD AUTO: 0.2 K/UL (ref 0–0.5)
EOSINOPHIL NFR BLD: 4.5 % (ref 0–8)
ERYTHROCYTE [DISTWIDTH] IN BLOOD BY AUTOMATED COUNT: 12.8 % (ref 11.5–14.5)
EST. GFR  (AFRICAN AMERICAN): >60 ML/MIN/1.73 M^2
EST. GFR  (NON AFRICAN AMERICAN): >60 ML/MIN/1.73 M^2
GLUCOSE SERPL-MCNC: 90 MG/DL (ref 70–110)
HCT VFR BLD AUTO: 31.4 % (ref 37–48.5)
HGB BLD-MCNC: 10.1 G/DL (ref 12–16)
IMM GRANULOCYTES # BLD AUTO: 0.02 K/UL (ref 0–0.04)
IMM GRANULOCYTES NFR BLD AUTO: 0.4 % (ref 0–0.5)
LYMPHOCYTES # BLD AUTO: 1.7 K/UL (ref 1–4.8)
LYMPHOCYTES NFR BLD: 31.2 % (ref 18–48)
MAGNESIUM SERPL-MCNC: 1.6 MG/DL (ref 1.6–2.6)
MCH RBC QN AUTO: 32.6 PG (ref 27–31)
MCHC RBC AUTO-ENTMCNC: 32.2 G/DL (ref 32–36)
MCV RBC AUTO: 101 FL (ref 82–98)
MONOCYTES # BLD AUTO: 0.5 K/UL (ref 0.3–1)
MONOCYTES NFR BLD: 10 % (ref 4–15)
NEUTROPHILS # BLD AUTO: 2.8 K/UL (ref 1.8–7.7)
NEUTROPHILS NFR BLD: 53.3 % (ref 38–73)
NRBC BLD-RTO: 0 /100 WBC
PLATELET # BLD AUTO: 226 K/UL (ref 150–350)
PMV BLD AUTO: 8.9 FL (ref 9.2–12.9)
POCT GLUCOSE: 103 MG/DL (ref 70–110)
POCT GLUCOSE: 114 MG/DL (ref 70–110)
POTASSIUM SERPL-SCNC: 3.4 MMOL/L (ref 3.5–5.1)
PROT SERPL-MCNC: 4.8 G/DL (ref 6–8.4)
RBC # BLD AUTO: 3.1 M/UL (ref 4–5.4)
SODIUM SERPL-SCNC: 135 MMOL/L (ref 136–145)
WBC # BLD AUTO: 5.29 K/UL (ref 3.9–12.7)

## 2020-11-01 PROCEDURE — 25000003 PHARM REV CODE 250: Performed by: HOSPITALIST

## 2020-11-01 PROCEDURE — 85025 COMPLETE CBC W/AUTO DIFF WBC: CPT

## 2020-11-01 PROCEDURE — 25000003 PHARM REV CODE 250: Performed by: FAMILY MEDICINE

## 2020-11-01 PROCEDURE — 11000001 HC ACUTE MED/SURG PRIVATE ROOM

## 2020-11-01 PROCEDURE — 36415 COLL VENOUS BLD VENIPUNCTURE: CPT

## 2020-11-01 PROCEDURE — 63600175 PHARM REV CODE 636 W HCPCS: Performed by: EMERGENCY MEDICINE

## 2020-11-01 PROCEDURE — 63600175 PHARM REV CODE 636 W HCPCS: Performed by: HOSPITALIST

## 2020-11-01 PROCEDURE — 99233 SBSQ HOSP IP/OBS HIGH 50: CPT | Mod: ,,, | Performed by: HOSPITALIST

## 2020-11-01 PROCEDURE — 83735 ASSAY OF MAGNESIUM: CPT

## 2020-11-01 PROCEDURE — 99233 PR SUBSEQUENT HOSPITAL CARE,LEVL III: ICD-10-PCS | Mod: ,,, | Performed by: HOSPITALIST

## 2020-11-01 PROCEDURE — 85730 THROMBOPLASTIN TIME PARTIAL: CPT

## 2020-11-01 PROCEDURE — 80053 COMPREHEN METABOLIC PANEL: CPT

## 2020-11-01 RX ORDER — ACETAMINOPHEN 500 MG
500 TABLET ORAL EVERY 8 HOURS PRN
Refills: 0
Start: 2020-11-01 | End: 2023-03-13

## 2020-11-01 RX ORDER — CEFTRIAXONE 1 G/1
1 INJECTION, POWDER, FOR SOLUTION INTRAMUSCULAR; INTRAVENOUS
Status: COMPLETED | OUTPATIENT
Start: 2020-11-01 | End: 2020-11-02

## 2020-11-01 RX ORDER — LORAZEPAM 0.5 MG/1
0.5 TABLET ORAL EVERY 6 HOURS PRN
Status: DISCONTINUED | OUTPATIENT
Start: 2020-11-01 | End: 2020-11-03 | Stop reason: HOSPADM

## 2020-11-01 RX ORDER — MAGNESIUM SULFATE HEPTAHYDRATE 40 MG/ML
2 INJECTION, SOLUTION INTRAVENOUS ONCE
Status: COMPLETED | OUTPATIENT
Start: 2020-11-01 | End: 2020-11-01

## 2020-11-01 RX ORDER — POTASSIUM CHLORIDE 20 MEQ/1
40 TABLET, EXTENDED RELEASE ORAL 2 TIMES DAILY
Status: DISCONTINUED | OUTPATIENT
Start: 2020-11-01 | End: 2020-11-02

## 2020-11-01 RX ADMIN — ATORVASTATIN CALCIUM 40 MG: 20 TABLET, FILM COATED ORAL at 08:11

## 2020-11-01 RX ADMIN — LORAZEPAM 0.5 MG: 0.5 TABLET ORAL at 11:11

## 2020-11-01 RX ADMIN — HEPARIN SODIUM AND DEXTROSE 7 UNITS/KG/HR: 10000; 5 INJECTION INTRAVENOUS at 03:11

## 2020-11-01 RX ADMIN — CEFTRIAXONE SODIUM 1 G: 1 INJECTION, POWDER, FOR SOLUTION INTRAMUSCULAR; INTRAVENOUS at 09:11

## 2020-11-01 RX ADMIN — POTASSIUM CHLORIDE 40 MEQ: 1500 TABLET, EXTENDED RELEASE ORAL at 08:11

## 2020-11-01 RX ADMIN — SERTRALINE HYDROCHLORIDE 100 MG: 50 TABLET ORAL at 09:11

## 2020-11-01 RX ADMIN — TICAGRELOR 90 MG: 90 TABLET ORAL at 09:11

## 2020-11-01 RX ADMIN — ASPIRIN 81 MG: 81 TABLET, COATED ORAL at 09:11

## 2020-11-01 RX ADMIN — TICAGRELOR 90 MG: 90 TABLET ORAL at 08:11

## 2020-11-01 RX ADMIN — LORAZEPAM 0.5 MG: 0.5 TABLET ORAL at 04:11

## 2020-11-01 RX ADMIN — HEPARIN SODIUM AND DEXTROSE 9 UNITS/KG/HR: 10000; 5 INJECTION INTRAVENOUS at 11:11

## 2020-11-01 RX ADMIN — MAGNESIUM SULFATE IN WATER 2 G: 40 INJECTION, SOLUTION INTRAVENOUS at 10:11

## 2020-11-01 RX ADMIN — LOSARTAN POTASSIUM 50 MG: 50 TABLET, FILM COATED ORAL at 09:11

## 2020-11-01 NOTE — PLAN OF CARE
11/01/20 0743   Post-Acute Status   Post-Acute Authorization Other   Other Status No Post-Acute Service Needs

## 2020-11-01 NOTE — PLAN OF CARE
11/01/20 1047   Discharge Assessment   Assessment Type Discharge Planning Assessment   Confirmed/corrected address and phone number on facesheet? Yes   Assessment information obtained from? Patient   Expected Length of Stay (days) 3   Communicated expected length of stay with patient/caregiver yes   Prior to hospitilization cognitive status: Alert/Oriented   Prior to hospitalization functional status: Independent   Current cognitive status: Alert/Oriented   Current Functional Status: Independent   Facility Arrived From: Home   Lives With child(bianca), adult   Able to Return to Prior Arrangements yes   Is patient able to care for self after discharge? Yes   Who are your caregiver(s) and their phone number(s)? [phone], radha Barnard   Patient's perception of discharge disposition home health   Readmission Within the Last 30 Days no previous admission in last 30 days   Patient currently being followed by outpatient case management? No   Patient currently receives any other outside agency services? No   Equipment Currently Used at Home none   Part D Coverage N.A   Do you have any problems affording any of your prescribed medications? No   Is the patient taking medications as prescribed? yes   Does the patient have transportation home? Yes   Transportation Anticipated family or friend will provide   Dialysis Name and Scheduled days N.A   Discharge Plan A Home with family   Discharge Plan B Home with family   DME Needed Upon Discharge  rollator   Patient/Family in Agreement with Plan yes     [facility] requesting Rollator at d/c, Sw informed of small up charge from standard walker for Rollator. Pt to have a procedure and Sw to follow post op recs and HME

## 2020-11-01 NOTE — PLAN OF CARE
Cardiology Update    Patient has been asymptomatic. TTE shows preserved LVEF of 68%, mild to moderate MR and mild AI. CVP is 3. Serial troponin trended 0.02 to 0.03. Interventional Cardiology consult placed for coronary angiogram +/- PCI tomorrow. Please keep patient NPO at [place].     Leyda Mcgee, PGY-6 (Cardiology Fellow)

## 2020-11-01 NOTE — NURSING
Pt's aPTT 91.7 @ 3815. No s/s of bleeding noted at time. Pt asymptomatic. IV Heparin stopped. STAT aPTT re-draw ordered. NICKOLAS Hollins notified. Will cont. To monitor.

## 2020-11-01 NOTE — PROGRESS NOTES
Progress Note   Hospital Medicine         Patient Name: Ayaka Ellison  MRN:  [id number]  Hospital Medicine Team: [facility] MED K Danielle Jones MD  Date of Admission:  10/30/2020     Length of Stay:  LOS: 1 day   Expected Discharge Date:   Principal Problem:  ACS (acute coronary syndrome)       Subjective:     Interval History/Overnight Events:      Reports feeling okay today without further nausea or dyspnea episodes, feeling better with UTi treatment and worked with PT with recs for home, rollator per daughter to help with fatigue at home. Was found on floor recently in [facility] due to such severe dyspnea/faigue and had to be helped up after being found by a child on the floor. Daughter at bedside and returned in later morning to review everything with her. Patient very anxiosu about cath and procedure. Reassured at length that If there is a lesions which we are worried about due to the symptoms EKG, and echo, you want to assess it with a cath to intervene to help all of these things and it would not be prudent to go home with a concern for a blockage in the heart as it if it is a partial blockage (as this isnt a stemi) that it could change if so and then cause much more sevre damage also. She agrees and we discussed wrist vs groin options, she asked if this is major surgery, and I told her is a procedure where you are in twilight sleep similar to colonoscopy and is not a major procedure. There are risks of every procedure and cardiology will explain all of these to her. The risk of not intervening or doing a stress test are not advisable here as the suspicion is high for a lesion so advise to go straight to cath by cards and I agree with this. She is less anxious once we talked about it and reviewd al the abov, says she feels better about things. Urine CX was NG so will opt to do 3 days rocephin total.    HR was 40s on 12.5 toprol so have to hold now as didn't appear to tolerate. BP better on home ARB. NPO MN  for cath planning tomorrow  Discussed all with daughter at bedside. Prn ativan added for anxiety in interim pre-procedureq6.      Review of Systems   Constitutional: Negative for chills,+ for poor appetite and fatigue, fever.   HENT: Negative for sore throat, trouble swallowing.    Eyes: Negative for photophobia, visual disturbance.   Respiratory: Negative for cough, + shortness of breath.    Cardiovascular: Negative for chest pain, palpitations, leg swelling.   Gastrointestinal: Negative for abdominal pain, constipation, diarrhea,+  nausea, vomiting.   Endocrine: Negative for cold intolerance, heat intolerance.   Genitourinary: Negative for dysuria,+  frequency.   Musculoskeletal: Negative for arthralgias, myalgias.   Skin: Negative for rash, wound, erythema   Neurological: Negative for dizziness, syncope, + weakness, light-headedness.   Psychiatric/Behavioral: Negative for confusion, hallucinations, anxiety  All other systems reviewed and are negative.    Objective:     Temp:  [96.1 °F (35.6 °C)-98.2 °F (36.8 °C)]   Pulse:  [47-56]   Resp:  [12-18]   BP: (119-179)/(66-83)   SpO2:  [95 %-98 %]       Physical Exam:  Constitutional: Appears well-developed and well-nourished. pleasant, anxious.  Head: Normocephalic and atraumatic.   Mouth/Throat: Oropharynx is clear and moist.   Eyes: EOM are normal. Pupils are equal, round, and reactive to light. No scleral icterus.   Neck: Normal range of motion. Neck supple.   Cardiovascular: Normal rate and regular rhythm.  No murmur heard.  Pulmonary/Chest: Effort normal and breath sounds normal. No respiratory distress. No wheezes, rales, or rhonchi  Abdominal: Soft. Bowel sounds are normal.  No distension or tenderness  Musculoskeletal: Normal range of motion. No edema.   Neurological: Alert and oriented to person, place, and time.   Skin: Skin is warm and dry.   Psychiatric: Normal mood and affect. Behavior is normal.     Recent Labs   Lab 10/30/20  1355 10/30/20  2027  10/31/20  0631 11/01/20  0628   WBC 8.56 7.25 7.48  7.48 5.29   HGB 12.5 11.9* 11.4*  11.4* 10.1*   HCT 37.5 34.8* 35.2*  35.2* 31.4*    261 264  264 226     Recent Labs   Lab 10/30/20  1355 10/31/20  0631 11/01/20  0628   * 137 135*   K 4.1 3.4* 3.4*   CL 94* 100 100   CO2 26 23 24   BUN 11 9 6*   CREATININE 1.4 1.0 0.9   * 96 90   CALCIUM 9.2 8.0* 7.6*   MG 1.4* 1.9 1.6     Recent Labs   Lab 10/30/20  1355 10/30/20  2027 10/31/20  0631 11/01/20  0628   ALKPHOS 74  --  58 57   ALT 12  --  9* 10   AST 25  --  25 25   ALBUMIN 3.4*  --  2.7* 2.5*   PROT 6.3  --  5.2* 4.8*   BILITOT 0.5  --  0.3 0.3   INR  --  1.1  --   --      Recent Labs   Lab 10/31/20  0751 10/31/20  1246 10/31/20  1727 10/31/20  2140 11/01/20  0945 11/01/20  1231   POCTGLUCOSE 86 115* 106 105 103 114*        aspirin  81 mg Oral Daily    atorvastatin  40 mg Oral QHS    cefTRIAXone (ROCEPHIN) IVPB  1 g Intravenous Q24H    losartan  50 mg Oral Daily    potassium chloride  40 mEq Oral BID    sertraline  100 mg Oral Daily    ticagrelor  90 mg Oral BID       Assessment and Plan     Ms. Ayaka Ellison is a 80 y.o. female who presented to [facility] on 10/30/2020 with     ACS (acute coronary syndrome)  NSTEMI  - presnted with recent dyspnea with exertion now at rest with consistent with spectrum of ACS- unstable angina/NSTEMI with suspected underlying lesion, wall motion abnormality in septum on echo  -asa, brilinta loaded, now daily and brilinta BID  -hep drip 48 hours until cath  -lipitor daily. HRs 40s on toprol 12.5 so have to hold now as didn't tolerate it appears. Home ARB resumed for BP control.  -trop peak at .054. EKG with ST depressions on admit. LDL at goal of 60.  -if recurrent dyspnea or N/V (possible her equivalent of ACS), repeat EKG.trop stat and consider nitro PRN  -plan for [facility] tomorrow, NPO MN. Cards following, appreciate assistance    Hypokalemia  Hypomagnesemia  -goal 4 and 2, replace PRN  -appetite is  maxine, boost breeze added 11/1            Hx of Pre Diabetes  - Hold home metformin  - SSI  - A1C 4.9 so not DM or Pre-DM now, daughter asked to switch to non DM diet, switched to Low Na on 11/1, NPO MN for cath    Mild to Moderate Mitral Regurgitation  -seen on echo, likely not the cause for symptoms of dyspnea on exertion as also has wall motion abn more concerning as cause for symptoms  -trend  -will need cards OP f/u to monitor        Acute cystitis  - continue Ceftriaxone  - Urine CX NG so will treat 3 days total    Weakness  -likely from angina suspected at home prior ot admit  -PT/OT rec home, will work on rollator for her at home per daughter preference given fall at home recently    Anxiety  -anxious about procedure tomorrow  -ativan PRN added for her    HLD  -LDL at goal. Cont statin    Nausea and Vomiting  -suspect may be her angina equivalent  -if reoccurs, has phenergan and recheck EKG, trop, consider nitro    Tobacco history  -nonsmoker now    Bradycardia  Med induced by toprol it appears  -hold BB now as risk of HR 40 higher than benefit in setting of ACS/presume CAD      Diet:  Low Na, NPO MN     DVT PPx:  Hep drip      Disposition:  Cath tomorrow for NSTEMI. NPO MN.

## 2020-11-01 NOTE — NURSING
Pt's HR is sustaining in the 40s at time, pt is asymptomatic at time. NICKOLAS Hollins notified. Will cont. To monitor pt.

## 2020-11-01 NOTE — PLAN OF CARE
[facility]  [address] [facility] [address]  Phone: [phone]  Fax: [phone]    Patient Name: Ayaka Ellison  Patient : [id number]  Admission Date: 10/30/2020  Today's Date: 2020    To Whom It May Concern:    AYAKA Ellison  was admitted to [facility] on 10/30/2020 and is expected to remain hospitalized beyond 2020. This patient is under my direct care.      Danielle Jones MD

## 2020-11-02 LAB
ABO + RH BLD: NORMAL
ALBUMIN SERPL BCP-MCNC: 2.8 G/DL (ref 3.5–5.2)
ALP SERPL-CCNC: 58 U/L (ref 55–135)
ALT SERPL W/O P-5'-P-CCNC: 12 U/L (ref 10–44)
ANION GAP SERPL CALC-SCNC: 11 MMOL/L (ref 8–16)
APTT BLDCRRT: 40.2 SEC (ref 21–32)
APTT BLDCRRT: 45.2 SEC (ref 21–32)
AST SERPL-CCNC: 26 U/L (ref 10–40)
BASOPHILS # BLD AUTO: 0.04 K/UL (ref 0–0.2)
BASOPHILS NFR BLD: 0.6 % (ref 0–1.9)
BILIRUB SERPL-MCNC: 0.3 MG/DL (ref 0.1–1)
BLD GP AB SCN CELLS X3 SERPL QL: NORMAL
BUN SERPL-MCNC: 5 MG/DL (ref 8–23)
CALCIUM SERPL-MCNC: 8.3 MG/DL (ref 8.7–10.5)
CHLORIDE SERPL-SCNC: 102 MMOL/L (ref 95–110)
CO2 SERPL-SCNC: 25 MMOL/L (ref 23–29)
CREAT SERPL-MCNC: 0.9 MG/DL (ref 0.5–1.4)
DIFFERENTIAL METHOD: ABNORMAL
EOSINOPHIL # BLD AUTO: 0.3 K/UL (ref 0–0.5)
EOSINOPHIL NFR BLD: 4.2 % (ref 0–8)
ERYTHROCYTE [DISTWIDTH] IN BLOOD BY AUTOMATED COUNT: 12.8 % (ref 11.5–14.5)
EST. GFR  (AFRICAN AMERICAN): >60 ML/MIN/1.73 M^2
EST. GFR  (NON AFRICAN AMERICAN): >60 ML/MIN/1.73 M^2
FERRITIN SERPL-MCNC: 453 NG/ML (ref 20–300)
FOLATE SERPL-MCNC: 14.7 NG/ML (ref 4–24)
GLUCOSE SERPL-MCNC: 97 MG/DL (ref 70–110)
HCT VFR BLD AUTO: 33.1 % (ref 37–48.5)
HGB BLD-MCNC: 10.7 G/DL (ref 12–16)
IMM GRANULOCYTES # BLD AUTO: 0.02 K/UL (ref 0–0.04)
IMM GRANULOCYTES NFR BLD AUTO: 0.3 % (ref 0–0.5)
LYMPHOCYTES # BLD AUTO: 2.4 K/UL (ref 1–4.8)
LYMPHOCYTES NFR BLD: 38 % (ref 18–48)
MAGNESIUM SERPL-MCNC: 1.8 MG/DL (ref 1.6–2.6)
MCH RBC QN AUTO: 32.5 PG (ref 27–31)
MCHC RBC AUTO-ENTMCNC: 32.3 G/DL (ref 32–36)
MCV RBC AUTO: 101 FL (ref 82–98)
MONOCYTES # BLD AUTO: 0.7 K/UL (ref 0.3–1)
MONOCYTES NFR BLD: 10.8 % (ref 4–15)
NEUTROPHILS # BLD AUTO: 2.9 K/UL (ref 1.8–7.7)
NEUTROPHILS NFR BLD: 46.1 % (ref 38–73)
NRBC BLD-RTO: 0 /100 WBC
PLATELET # BLD AUTO: 265 K/UL (ref 150–350)
PMV BLD AUTO: 8.9 FL (ref 9.2–12.9)
POCT GLUCOSE: 101 MG/DL (ref 70–110)
POCT GLUCOSE: 196 MG/DL (ref 70–110)
POCT GLUCOSE: 98 MG/DL (ref 70–110)
POTASSIUM SERPL-SCNC: 4.4 MMOL/L (ref 3.5–5.1)
PROT SERPL-MCNC: 5.3 G/DL (ref 6–8.4)
RBC # BLD AUTO: 3.29 M/UL (ref 4–5.4)
SODIUM SERPL-SCNC: 138 MMOL/L (ref 136–145)
VIT B12 SERPL-MCNC: 1392 PG/ML (ref 210–950)
WBC # BLD AUTO: 6.19 K/UL (ref 3.9–12.7)

## 2020-11-02 PROCEDURE — 25000003 PHARM REV CODE 250: Performed by: FAMILY MEDICINE

## 2020-11-02 PROCEDURE — 99152 MOD SED SAME PHYS/QHP 5/>YRS: CPT | Mod: GC,,, | Performed by: INTERNAL MEDICINE

## 2020-11-02 PROCEDURE — 93005 ELECTROCARDIOGRAM TRACING: CPT

## 2020-11-02 PROCEDURE — 99232 SBSQ HOSP IP/OBS MODERATE 35: CPT | Mod: ,,, | Performed by: HOSPITALIST

## 2020-11-02 PROCEDURE — 63600175 PHARM REV CODE 636 W HCPCS: Performed by: HOSPITALIST

## 2020-11-02 PROCEDURE — 99152 MOD SED SAME PHYS/QHP 5/>YRS: CPT | Performed by: INTERNAL MEDICINE

## 2020-11-02 PROCEDURE — 99153 MOD SED SAME PHYS/QHP EA: CPT | Performed by: INTERNAL MEDICINE

## 2020-11-02 PROCEDURE — 11000001 HC ACUTE MED/SURG PRIVATE ROOM

## 2020-11-02 PROCEDURE — 99152 PR MOD CONSCIOUS SEDATION, SAME PHYS, 5+ YRS, FIRST 15 MIN: ICD-10-PCS | Mod: GC,,, | Performed by: INTERNAL MEDICINE

## 2020-11-02 PROCEDURE — 93010 ELECTROCARDIOGRAM REPORT: CPT | Mod: ,,, | Performed by: INTERNAL MEDICINE

## 2020-11-02 PROCEDURE — 82607 VITAMIN B-12: CPT

## 2020-11-02 PROCEDURE — 86901 BLOOD TYPING SEROLOGIC RH(D): CPT

## 2020-11-02 PROCEDURE — 93458 L HRT ARTERY/VENTRICLE ANGIO: CPT | Mod: 26,GC,, | Performed by: INTERNAL MEDICINE

## 2020-11-02 PROCEDURE — 93458 PR CATH PLACE/CORON ANGIO, IMG SUPER/INTERP,W LEFT HEART VENTRICULOGRAPHY: ICD-10-PCS | Mod: 26,GC,, | Performed by: INTERNAL MEDICINE

## 2020-11-02 PROCEDURE — 99232 PR SUBSEQUENT HOSPITAL CARE,LEVL II: ICD-10-PCS | Mod: ,,, | Performed by: HOSPITALIST

## 2020-11-02 PROCEDURE — 80053 COMPREHEN METABOLIC PANEL: CPT

## 2020-11-02 PROCEDURE — C1894 INTRO/SHEATH, NON-LASER: HCPCS | Performed by: INTERNAL MEDICINE

## 2020-11-02 PROCEDURE — 83735 ASSAY OF MAGNESIUM: CPT

## 2020-11-02 PROCEDURE — 36415 COLL VENOUS BLD VENIPUNCTURE: CPT

## 2020-11-02 PROCEDURE — 99232 SBSQ HOSP IP/OBS MODERATE 35: CPT | Mod: 25,GC,, | Performed by: INTERNAL MEDICINE

## 2020-11-02 PROCEDURE — 63600175 PHARM REV CODE 636 W HCPCS: Performed by: INTERNAL MEDICINE

## 2020-11-02 PROCEDURE — 99232 PR SUBSEQUENT HOSPITAL CARE,LEVL II: ICD-10-PCS | Mod: 25,GC,, | Performed by: INTERNAL MEDICINE

## 2020-11-02 PROCEDURE — 25500020 PHARM REV CODE 255: Performed by: INTERNAL MEDICINE

## 2020-11-02 PROCEDURE — 25000003 PHARM REV CODE 250: Performed by: STUDENT IN AN ORGANIZED HEALTH CARE EDUCATION/TRAINING PROGRAM

## 2020-11-02 PROCEDURE — C1769 GUIDE WIRE: HCPCS | Performed by: INTERNAL MEDICINE

## 2020-11-02 PROCEDURE — 85730 THROMBOPLASTIN TIME PARTIAL: CPT

## 2020-11-02 PROCEDURE — 25000003 PHARM REV CODE 250: Performed by: HOSPITALIST

## 2020-11-02 PROCEDURE — 25000003 PHARM REV CODE 250: Performed by: INTERNAL MEDICINE

## 2020-11-02 PROCEDURE — 85025 COMPLETE CBC W/AUTO DIFF WBC: CPT

## 2020-11-02 PROCEDURE — 82728 ASSAY OF FERRITIN: CPT

## 2020-11-02 PROCEDURE — 82746 ASSAY OF FOLIC ACID SERUM: CPT

## 2020-11-02 PROCEDURE — 93458 L HRT ARTERY/VENTRICLE ANGIO: CPT | Performed by: INTERNAL MEDICINE

## 2020-11-02 PROCEDURE — 93010 EKG 12-LEAD: ICD-10-PCS | Mod: ,,, | Performed by: INTERNAL MEDICINE

## 2020-11-02 RX ORDER — HYDRALAZINE HYDROCHLORIDE 25 MG/1
25 TABLET, FILM COATED ORAL EVERY 8 HOURS PRN
Status: DISCONTINUED | OUTPATIENT
Start: 2020-11-02 | End: 2020-11-03 | Stop reason: HOSPADM

## 2020-11-02 RX ORDER — ATORVASTATIN CALCIUM 40 MG/1
40 TABLET, FILM COATED ORAL NIGHTLY
Qty: 90 TABLET | Refills: 3 | Status: SHIPPED | OUTPATIENT
Start: 2020-11-02 | End: 2023-03-13

## 2020-11-02 RX ORDER — LIDOCAINE HYDROCHLORIDE 20 MG/ML
INJECTION, SOLUTION EPIDURAL; INFILTRATION; INTRACAUDAL; PERINEURAL
Status: DISCONTINUED | OUTPATIENT
Start: 2020-11-02 | End: 2020-11-02 | Stop reason: HOSPADM

## 2020-11-02 RX ORDER — HEPARIN SODIUM 200 [USP'U]/100ML
INJECTION, SOLUTION INTRAVENOUS
Status: DISCONTINUED | OUTPATIENT
Start: 2020-11-02 | End: 2020-11-02

## 2020-11-02 RX ORDER — MIDAZOLAM HYDROCHLORIDE 1 MG/ML
INJECTION, SOLUTION INTRAMUSCULAR; INTRAVENOUS
Status: DISCONTINUED | OUTPATIENT
Start: 2020-11-02 | End: 2020-11-02 | Stop reason: HOSPADM

## 2020-11-02 RX ORDER — LOSARTAN POTASSIUM 50 MG/1
50 TABLET ORAL ONCE
Status: COMPLETED | OUTPATIENT
Start: 2020-11-02 | End: 2020-11-02

## 2020-11-02 RX ORDER — SODIUM CHLORIDE 9 MG/ML
20 INJECTION, SOLUTION INTRAVENOUS CONTINUOUS
Status: ACTIVE | OUTPATIENT
Start: 2020-11-02 | End: 2020-11-02

## 2020-11-02 RX ORDER — DIPHENHYDRAMINE HCL 50 MG
50 CAPSULE ORAL ONCE
Status: COMPLETED | OUTPATIENT
Start: 2020-11-02 | End: 2020-11-02

## 2020-11-02 RX ORDER — LANOLIN ALCOHOL/MO/W.PET/CERES
400 CREAM (GRAM) TOPICAL ONCE
Status: COMPLETED | OUTPATIENT
Start: 2020-11-02 | End: 2020-11-02

## 2020-11-02 RX ORDER — HEPARIN SODIUM 1000 [USP'U]/ML
INJECTION, SOLUTION INTRAVENOUS; SUBCUTANEOUS
Status: DISCONTINUED | OUTPATIENT
Start: 2020-11-02 | End: 2020-11-02

## 2020-11-02 RX ORDER — FENTANYL CITRATE 50 UG/ML
INJECTION, SOLUTION INTRAMUSCULAR; INTRAVENOUS
Status: DISCONTINUED | OUTPATIENT
Start: 2020-11-02 | End: 2020-11-02 | Stop reason: HOSPADM

## 2020-11-02 RX ORDER — SODIUM CHLORIDE 9 MG/ML
3 INJECTION, SOLUTION INTRAVENOUS CONTINUOUS
Status: ACTIVE | OUTPATIENT
Start: 2020-11-02 | End: 2020-11-02

## 2020-11-02 RX ORDER — LOSARTAN POTASSIUM 50 MG/1
100 TABLET ORAL DAILY
Status: DISCONTINUED | OUTPATIENT
Start: 2020-11-03 | End: 2020-11-03 | Stop reason: HOSPADM

## 2020-11-02 RX ADMIN — DIPHENHYDRAMINE HYDROCHLORIDE 50 MG: 50 CAPSULE ORAL at 09:11

## 2020-11-02 RX ADMIN — ATORVASTATIN CALCIUM 40 MG: 20 TABLET, FILM COATED ORAL at 09:11

## 2020-11-02 RX ADMIN — HYDRALAZINE HYDROCHLORIDE 25 MG: 25 TABLET, FILM COATED ORAL at 06:11

## 2020-11-02 RX ADMIN — SODIUM CHLORIDE 3 ML/KG/HR: 0.9 INJECTION, SOLUTION INTRAVENOUS at 09:11

## 2020-11-02 RX ADMIN — LOSARTAN POTASSIUM 50 MG: 50 TABLET, FILM COATED ORAL at 09:11

## 2020-11-02 RX ADMIN — TICAGRELOR 90 MG: 90 TABLET ORAL at 09:11

## 2020-11-02 RX ADMIN — ACETAMINOPHEN 1000 MG: 500 TABLET ORAL at 09:11

## 2020-11-02 RX ADMIN — Medication 400 MG: at 09:11

## 2020-11-02 RX ADMIN — CEFTRIAXONE SODIUM 1 G: 1 INJECTION, POWDER, FOR SOLUTION INTRAMUSCULAR; INTRAVENOUS at 09:11

## 2020-11-02 RX ADMIN — SODIUM CHLORIDE 1216 ML: 0.9 INJECTION, SOLUTION INTRAVENOUS at 12:11

## 2020-11-02 RX ADMIN — SERTRALINE HYDROCHLORIDE 100 MG: 50 TABLET ORAL at 09:11

## 2020-11-02 RX ADMIN — MELATONIN TAB 3 MG 6 MG: 3 TAB at 09:11

## 2020-11-02 RX ADMIN — ASPIRIN 81 MG: 81 TABLET, COATED ORAL at 09:11

## 2020-11-02 NOTE — PLAN OF CARE
11/02/20 0829   Post-Acute Status   Post-Acute Authorization Other   Other Status No Post-Acute Service Needs

## 2020-11-02 NOTE — HPI
Ayaka Ellison is a 79 yo F with PMHx significant HTN, HLD, DM2 x 5 years, former tobacco use (1/2 ppd x 20 years) who presents to ED with c/o nausea/vomiting. Patient reports symptoms began approx 3 day ago and have been relatively constant / persistent since.  Also reports associated lightheadedness/dizziness and fatigue but no syncope. Due to persistent symptoms her daughter prompted patient to come to ED.      On full review of systems patient denies fever, chills, sweats. Does acknowledge new onset dyspnea on exertion that began approx 1 month ago. Claims she is unable to ambulate more than 10 feet w/o having to stop to catch her breath and has difficulty ambulating up flight of stairs at home w/o stopping now. She otherwise denies chest pain, orthopnea, pnd, peripheral swelling, or syncope. Further denies personal hx of CAD/MI, CVA/TIA, etc. FHx positive for HTN in her father but no known CAD, MI, CVA/TIA, etc.      On arrival to ED patient is afebrile and hemodynamically stable (symetrical /60s, HR 60s). Labs notable for CBC WNL. BUN 11, Cr 1.4. BNP 63. Initial trop negative 0.024 and second set borderline positive at 0.028. EKG shows NSR with ST depressions in inferolateral leads (II, III, aVF, V4-V6) for which Cardiology is consulted. No prior labs or EKGs available in our system.

## 2020-11-02 NOTE — ASSESSMENT & PLAN NOTE
79 yo F with HTN, HLD, DM2, former tobacco use who presents to ED 10/30 with new onset nausea/vomiting and exertional dyspnea of several weeks duration found to have NSTEMI (inferolateral ST depressions on EKG, TnI peaked 0.054).     BAL score 4 (for age, risk factors, EKG changes, positive biomarkers).    LVEF 65% by echocardiogram.     Plan:  Proceed with [facility] with probable PCI  Access: R radial (6Fr slender) with femoral back up   Anti-platelets: ASA/Brilinta  Creatinine/CrCl: 0.9 on 11/2/20  Allergies: No shellfish/Iodine allergy  Pre-Hydration: 3 cc/kg/hr IV, continuous, for 1 hour, Pre-Procedure  Pre-Op Med: Diphenhydramine (Benadryl) 50 mg, Oral, Once, Pre-Procedure     The risks, benefits & alternatives of the procedure were explained to the patient. The risks of coronary angiography include but are not limited to: Bleeding, infection, heart rhythm abnormalities, allergic reactions, kidney injury requiring dilaysis, limb loss, stroke and death. Should stenting be indicated, the patient has agreed to dual anti-platelet therapy for 1-consecutive year with a drug-eluting stent and a minimum of 1-month with the use of a bare metal stent. Additionally, patient is aware that non-compliance is likely to result in stent clotting with heart attack, heart failure, and/or death. The risks of moderate sedation include hypotension, respiratory depression, arrhythmias, bronchospasm, & death. Informed consent was obtained & the patient is agreeable to proceed with the procedure.

## 2020-11-02 NOTE — PROGRESS NOTES
Report received from ELLEN Caban. patient s/p [facility]. R radial vasc band intact. No bleeding or hematoma noted. Vss. No complaints from patient. Patient connected to bedside monitor and telemetry. IVF infusing. Will monitor.

## 2020-11-02 NOTE — PT/OT/SLP PROGRESS
Physical Therapy      Patient Name:  Ayaka Ellison   MRN:  [id number]    Patient not seen today secondary to Unavailable (Comment)(Pt off floor for C upon attempt). Will follow-up 11/3/2020.    Danna Lozada, PT

## 2020-11-02 NOTE — OP NOTE
"    Post Cath Note  Referring Physician: Danielle Jones MD  Procedure: Angiogram, Coronary, with Left Heart Cath (Left)    Intervention   Access: Right radial    LVEDP 15 mmHg (not accurate)    See full report for further details    Non-obstructive coronaries     Closure device: Radial band    Post Cath Exam:   BP (!) 169/81 (BP Location: Right arm, Patient Position: Lying)   Pulse (!) 59   Temp 96.7 °F (35.9 °C) (Oral)   Resp 16   Ht 5' 6" (1.676 m)   Wt 60.8 kg (134 lb 0.6 oz)   SpO2 98%   BMI 21.63 kg/m²   No unusual pain, hematoma, thrill or bruit at vascular access site.  Distal pulse present without signs of ischemia.    Recommendations:     - Routine post-cath care  - Non-obstructive coronaries   - Continue home dose Aspirin 81 mg PO daily   - EKG when arrives to floor.   - Routine post cath protocol.  - Maximize medical management.  - Further care by the primary team.  - IVF at 3 cc/kg/hr for 4 hrs  - Cardiac rehab referral, Continue medical management, Risk factor reduction, Follow-up with outpatient cardiologist    Cj Araujo MD  Cardiology Fellow (PGY-V)  Pager: [phone]      "

## 2020-11-02 NOTE — CONSULTS
[facility]  Interventional Cardiology  Consult Note    Patient Name: Ayaka Ellison  MRN: [id number]  Admission Date: 10/30/2020  Hospital Length of Stay: 2 days  Code Status: Full Code   Attending Provider: Danielle Jones MD  Consulting Provider: Ayaka Altman MD  Primary Care Physician: Primary Doctor No  Principal Problem:ACS (acute coronary syndrome)    Patient information was obtained from patient and ER records.     Inpatient consult to Interventional Cardiology  Consult performed by: Ayaka Altman MD  Consult ordered by: Leyda Mcgee MD        Subjective:     HPI: Ayaka Ellison is a 79 yo F with PMHx significant HTN, HLD, DM2 x 5 years, former tobacco use (1/2 ppd x 20 years) who presented to ED on 10/30/20 with c/o nausea/vomiting. Patient reports symptoms began approx 3 day ago and have been relatively constant.  Also reports associated lightheadedness/dizziness and fatigue. On full review of systems acknowledges new onset dyspnea on exertion that began approx 1 month ago. Claims she is unable to ambulate more than 10 feet w/o having to stop to catch her breath and has difficulty ascending flight of stairs at home w/o having to stop. She otherwise denies chest pain, orthopnea, pnd, peripheral swelling, or syncope. Further denies personal hx of CAD/MI, CVA/TIA, PAD, etc.     On arrival to ED patient afebrile and hemodynamically stable (symetrical /60s, HR 60s).     Labs notable for BNP 63 and uptrending TnI 0.024 --> 0.028 --> 0.054 (peaked).     EKG NSR with ST depressions in inferolateral leads (II, III, aVF, V4-V6).     Echo with preserved LV systolic function, mild AI, mild-mod MR, no WMAs.    Patient was started on ACS protocol - loaded with ASA/Brilinta and heparin infusion.    Past Medical History:   Diagnosis Date    Arthritis     Cataract     Glaucoma     Hyperlipidemia     Hypertension     Sleep apnea        Past Surgical History:    Procedure Laterality Date    APPENDECTOMY      BREAST SURGERY      right breast lumpectomy    CHOLECYSTECTOMY      HAND SURGERY Right        Review of patient's allergies indicates:   Allergen Reactions    Penicillins Itching    Codeine Nausea Only       PTA Medications   Medication Sig    albuterol (PROVENTIL HFA) 90 mcg/actuation inhaler Inhale 2 puffs into the lungs every 6 (six) hours as needed for Wheezing. Rescue    aspirin (ECOTRIN) 81 MG EC tablet Take 81 mg by mouth once daily.    atorvastatin (LIPITOR) 20 MG tablet Take 20 mg by mouth once daily.    calcium carbonate (OS-MUNIRA) 600 mg calcium (1,500 mg) Tab Take 600 mg by mouth once.    calcium-vitamin D3 500 mg(1,250mg) -200 unit per tablet Take 1 tablet by mouth 2 (two) times daily with meals.    cyanocobalamin (VITAMIN B-12) 1000 MCG tablet Take 100 mcg by mouth once daily.    folic acid (FOLVITE) 400 MCG tablet Take 400 mcg by mouth once daily.    ginkgo biloba 40 mg Tab Take by mouth.    ibuprofen (ADVIL,MOTRIN) 600 MG tablet Take 600 mg by mouth every 6 (six) hours as needed for Pain.    icosapent ethyl (VASCEPA) 1 gram Cap Take 1 tablet by mouth 2 (two) times daily.    Lactobac no.41-Bifidobact no.7 (PROBIOTIC-10) 70 mg (3 billion cell) Cap Take by mouth.    latanoprost 0.005 % ophthalmic solution 1 drop every evening.    losartan (COZAAR) 50 MG tablet Take 50 mg by mouth once daily.    METFORMIN/AA 7/JYXF669/CHOLINE (METFORMIN-AA7-KDSSOV195-HYBAMY ORAL) Take by mouth.    multivitamin-Ca-iron-minerals 18-0.4 mg Tab Take 1 tablet by mouth.    nabumetone (RELAFEN) 750 MG tablet Take 750 mg by mouth 2 (two) times daily.    omega 3,6,9 combination no.7 92 mg (43 mg-22 hn-24ar-95xd) Chew Take by mouth.    omeprazole-sodium bicarbonate (ZEGERID) 40-1.1 mg-gram per capsule Take 1 capsule by mouth before breakfast.    sertraline (ZOLOFT) 100 MG tablet Take 100 mg by mouth once daily.    [DISCONTINUED] simvastatin (ZOCOR) 40 MG  tablet Take 40 mg by mouth every evening.     Family History     None        Tobacco Use    Smoking status: Light Tobacco Smoker     Types: Vaping w/o nicotine    Smokeless tobacco: Never Used   Substance and Sexual Activity    Alcohol use: Yes    Drug use: No    Sexual activity: Not on file     Review of Systems   Constitution: Negative for chills, decreased appetite, diaphoresis, fever, malaise/fatigue, night sweats, weight gain and weight loss.   Eyes: Negative.    Cardiovascular: Negative for chest pain, irregular heartbeat, leg swelling, near-syncope, orthopnea, palpitations, paroxysmal nocturnal dyspnea and syncope.   Respiratory: Negative for cough, shortness of breath, sputum production and wheezing.    Endocrine: Negative.    Hematologic/Lymphatic: Negative for bleeding problem.   Skin: Negative for color change, flushing, rash and suspicious lesions.   Musculoskeletal: Negative.    Gastrointestinal: Negative for bloating, abdominal pain, change in bowel habit, constipation, diarrhea, heartburn, melena, nausea and vomiting.   Genitourinary: Negative for flank pain, frequency, hematuria, incomplete emptying and urgency.   Neurological: Negative for dizziness, focal weakness, headaches, light-headedness, numbness, paresthesias, seizures, sensory change and weakness.   Psychiatric/Behavioral: Negative for altered mental status. The patient is not nervous/anxious.      Objective:     Vital Signs (Most Recent):  Temp: 97.8 °F (36.6 °C) (11/01/20 2016)  Pulse: (!) 58 (11/01/20 2016)  Resp: 18 (11/01/20 2016)  BP: 125/71 (11/01/20 2016)  SpO2: 97 % (11/01/20 2016) Vital Signs (24h Range):  Temp:  [96.1 °F (35.6 °C)-98.2 °F (36.8 °C)] 97.8 °F (36.6 °C)  Pulse:  [47-58] 58  Resp:  [12-18] 18  SpO2:  [97 %-98 %] 97 %  BP: (119-179)/(67-83) 125/71     Weight: 60.8 kg (134 lb 0.6 oz)  Body mass index is 21.63 kg/m².    SpO2: 97 %  O2 Device (Oxygen Therapy): room air      Intake/Output Summary (Last 24 hours) at  11/1/2020 2044  Last data filed at 11/1/2020 0800  Gross per 24 hour   Intake 237 ml   Output 300 ml   Net -63 ml       Lines/Drains/Airways     Peripheral Intravenous Line                 Peripheral IV - Single Lumen 10/30/20 20 G Left Wrist 2 days         Peripheral IV - Single Lumen 10/30/20 2027 20 G Left Forearm 2 days                Physical Exam   Constitutional: She is oriented to person, place, and time. She appears well-developed and well-nourished. No distress.   HENT:   Head: Normocephalic and atraumatic.   Mouth/Throat: Oropharynx is clear and moist.   Eyes: Pupils are equal, round, and reactive to light. EOM are normal.   Neck: Normal range of motion. Neck supple. No JVD present.   Cardiovascular: Normal rate and regular rhythm. Exam reveals no gallop and no friction rub.   No murmur heard.  Pulses:       Radial pulses are 2+ on the right side and 2+ on the left side.        Femoral pulses are 2+ on the right side and 2+ on the left side.       Dorsalis pedis pulses are 2+ on the right side and 2+ on the left side.        Posterior tibial pulses are 2+ on the right side and 2+ on the left side.   Pulmonary/Chest: Effort normal and breath sounds normal. No respiratory distress. She has no wheezes. She has no rales. She exhibits no tenderness.   Abdominal: Soft. Bowel sounds are normal. She exhibits no distension. There is no abdominal tenderness.   Musculoskeletal: Normal range of motion.         General: No edema.   Lymphadenopathy:     She has no cervical adenopathy.   Neurological: She is alert and oriented to person, place, and time.   Skin: Skin is warm and dry. No erythema.   Psychiatric: She has a normal mood and affect. Her behavior is normal. Judgment and thought content normal.       Significant Labs: Reviewed in EPIC.    Significant Imaging: Reviewed in EPIC.    Assessment and Plan:     NSTEMI (non-ST elevated myocardial infarction)  81 yo F with HTN, HLD, DM2, former tobacco use who presents  to ED 10/30 with new onset nausea/vomiting and exertional dyspnea of several weeks duration found to have NSTEMI (inferolateral ST depressions on EKG, TnI peaked 0.054).     BAL score 4 (for age, risk factors, EKG changes, positive biomarkers).    LVEF 65% by echocardiogram.     Plan:  Proceed with [facility] with probable PCI  Access: R radial (6Fr slender) with femoral back up   Anti-platelets: ASA/Brilinta  Creatinine/CrCl: 0.9 on 11/2/20  Allergies: No shellfish/Iodine allergy  Pre-Hydration: 3 cc/kg/hr IV, continuous, for 1 hour, Pre-Procedure  Pre-Op Med: Diphenhydramine (Benadryl) 50 mg, Oral, Once, Pre-Procedure     The risks, benefits & alternatives of the procedure were explained to the patient. The risks of coronary angiography include but are not limited to: Bleeding, infection, heart rhythm abnormalities, allergic reactions, kidney injury requiring dilaysis, limb loss, stroke and death. Should stenting be indicated, the patient has agreed to dual anti-platelet therapy for 1-consecutive year with a drug-eluting stent and a minimum of 1-month with the use of a bare metal stent. Additionally, patient is aware that non-compliance is likely to result in stent clotting with heart attack, heart failure, and/or death. The risks of moderate sedation include hypotension, respiratory depression, arrhythmias, bronchospasm, & death. Informed consent was obtained & the patient is agreeable to proceed with the procedure.        Thank you for your consult.    Ayaka Altman MD  Interventional Cardiology   [facility]

## 2020-11-02 NOTE — ASSESSMENT & PLAN NOTE
Assessment:  Patient is a 79 yo F presenting with SOB with elevated troponins. Patient has been managed according to ACS for NSTEMI. Left heart catheter showed no coronary narrowing.    Plan:  - ASA 81 mg  - DC heparin  - Atorvastain 40 mg QD  - metoprolol succinate 50 mg qd  - RBC transfusion if Hgb < 8 g/dL  - Routine post-cath care  - Continue home dose Aspirin 81 mg PO daily   - EKG when arrives to floor.   - Maximize medical management.  - Cardiac rehab referral, Continue medical management, Risk factor reduction, Follow-up with outpatient cardiologist

## 2020-11-02 NOTE — PROGRESS NOTES
[facility] Stay Cardiac Unit  Cardiology  Progress Note    Patient Name: Ayaka Ellison  MRN: [id number]  Admission Date: 10/30/2020  Hospital Length of Stay: 2 days  Code Status: Full Code   Attending Physician: Danielle Jones MD   Primary Care Physician: Primary Doctor No  Expected Discharge Date: 11/3/2020  Principal Problem:ACS (acute coronary syndrome)    Subjective:     Hospital Course:   Patient initiated on ACS pathway for possible NSTEMI. LHC today showed no narrowing warranting intervention. LHC findings include:  · The coronary arteries were normal.  · The filling pressures on the left were normal.  · The pre-procedure left ventricular end diastolic pressure was 6.  · The estimated blood loss was <50 mL.    Patient was managed medically.    Interval History: Patient went for LHC today. No coronary narrowing. Patient asymptomatic on interview today. States she is anxious for her procedure and would like lorazepam. No chest pain/pressure, SOB, or worsening leg swelling.    Review of Systems   Constitution: Negative for chills and fever.   Eyes: Negative for pain and visual disturbance.   Cardiovascular: Negative for chest pain, irregular heartbeat, leg swelling, orthopnea, palpitations and paroxysmal nocturnal dyspnea.   Respiratory: Negative for cough, hemoptysis and shortness of breath.    Skin: Negative for dry skin and rash.   Musculoskeletal: Negative for falls and muscle cramps.   Gastrointestinal: Negative for abdominal pain, change in bowel habit, nausea and vomiting.   Genitourinary: Negative for dysuria and frequency.   Neurological: Negative for dizziness, focal weakness and light-headedness.     Objective:     Vital Signs (Most Recent):  Temp: 96.7 °F (35.9 °C) (11/02/20 0735)  Pulse: 62 (11/02/20 1530)  Resp: 16 (11/02/20 1530)  BP: (!) 149/67 (11/02/20 1530)  SpO2: 99 % (11/02/20 1530) Vital Signs (24h Range):  Temp:  [96.1 °F (35.6 °C)-97.8 °F (36.6 °C)] 96.7 °F  (35.9 °C)  Pulse:  [52-76] 62  Resp:  [11-20] 16  SpO2:  [97 %-100 %] 99 %  BP: (125-169)/(60-81) 149/67     Weight: 60.8 kg (134 lb 0.6 oz)  Body mass index is 21.63 kg/m².     SpO2: 99 %  O2 Device (Oxygen Therapy): room air      Intake/Output Summary (Last 24 hours) at 11/2/2020 1619  Last data filed at 11/2/2020 1500  Gross per 24 hour   Intake 493.5 ml   Output --   Net 493.5 ml       Lines/Drains/Airways     Peripheral Intravenous Line                 Peripheral IV - Single Lumen 10/30/20 20 G Left Wrist 3 days         Peripheral IV - Single Lumen 10/30/20 2027 20 G Left Forearm 2 days                Physical Exam   Constitutional: She is oriented to person, place, and time. She appears well-developed and well-nourished. No distress.   HENT:   Head: Normocephalic and atraumatic.   Eyes: Pupils are equal, round, and reactive to light. EOM are normal.   Neck: Normal range of motion. No JVD present.   Cardiovascular: Normal rate, regular rhythm, normal heart sounds and intact distal pulses. Exam reveals no gallop and no friction rub.   No murmur heard.  Pulmonary/Chest: Effort normal and breath sounds normal. No respiratory distress. She has no wheezes. She has no rales.   Abdominal: Soft. Bowel sounds are normal. She exhibits no distension. There is no abdominal tenderness.   Musculoskeletal: Normal range of motion.         General: No tenderness or edema.   Lymphadenopathy:     She has no cervical adenopathy.   Neurological: She is alert and oriented to person, place, and time.   Skin: Skin is warm and dry. She is not diaphoretic.   Vitals reviewed.      Significant Labs:   ABG: No results for input(s): PH, PCO2, HCO3, POCSATURATED, BE in the last 48 hours., BMP:   Recent Labs   Lab 11/01/20 0628 11/02/20 0422   GLU 90 97   * 138   K 3.4* 4.4    102   CO2 24 25   BUN 6* 5*   CREATININE 0.9 0.9   CALCIUM 7.6* 8.3*   MG 1.6 1.8   , CMP   Recent Labs   Lab 11/01/20 0628 11/02/20 0422   * 138    K 3.4* 4.4    102   CO2 24 25   GLU 90 97   BUN 6* 5*   CREATININE 0.9 0.9   CALCIUM 7.6* 8.3*   PROT 4.8* 5.3*   ALBUMIN 2.5* 2.8*   BILITOT 0.3 0.3   ALKPHOS 57 58   AST 25 26   ALT 10 12   ANIONGAP 11 11   ESTGFRAFRICA >60.0 >60.0   EGFRNONAA >60.0 >60.0   , CBC   Recent Labs   Lab 11/01/20  0628 11/02/20  0422   WBC 5.29 6.19   HGB 10.1* 10.7*   HCT 31.4* 33.1*    265   , INR No results for input(s): INR, PROTIME in the last 48 hours., Troponin No results for input(s): TROPONINI in the last 48 hours. and All pertinent lab results from the last 24 hours have been reviewed.    Significant Imaging: Echocardiogram:   2D echo with color flow doppler: No results found for this or any previous visit. and Transthoracic echo (TTE) complete (Cupid Only):   Results for orders placed or performed during the hospital encounter of 10/30/20   Echo Color Flow Doppler? Yes   Result Value Ref Range    Ascending aorta 2.95 cm    STJ 2.09 cm    AV mean gradient 3 mmHg    Ao peak aurelio 1.23 m/s    Ao VTI 26.93 cm    IVRT 145.58 msec    IVS 0.93 0.6 - 1.1 cm    LA size 2.78 cm    Left Atrium Major Axis 5.19 cm    Left Atrium Minor Axis 5.17 cm    LVIDd 4.02 3.5 - 6.0 cm    LVIDs 2.51 2.1 - 4.0 cm    LVOT diameter 1.94 cm    LVOT peak VTI 16.53 cm    Posterior Wall 0.96 0.6 - 1.1 cm    MV Peak A Aurelio 0.65 m/s    E wave decelartion time 324.55 msec    MV Peak E Aurelio 0.51 m/s    RA Major Axis 4.51 cm    RA Width 2.31 cm    RVDD 2.19 cm    Sinus 2.80 cm    TAPSE 1.47 cm    TDI LATERAL 0.06 m/s    TDI SEPTAL 0.05 m/s    LA WIDTH 3.49 cm    MV stenosis pressure 1/2 time 94.12 ms    LV Diastolic Volume 70.86 mL    LV Systolic Volume 22.58 mL    RV S' 9.22 cm/s    LVOT peak aurelio 0.69 m/s    LV LATERAL E/E' RATIO 8.50 m/s    LV SEPTAL E/E' RATIO 10.20 m/s    FS 38 %    LA volume 42.72 cm3    LV mass 118.29 g    Left Ventricle Relative Wall Thickness 0.48 cm    AV valve area 1.81 cm2    AV Velocity Ratio 0.56     AV index  (prosthetic) 0.61     MV valve area p 1/2 method 2.34 cm2    E/A ratio 0.78     Mean e' 0.06 m/s    LVOT area 3.0 cm2    LVOT stroke volume 48.84 cm3    AV peak gradient 6 mmHg    E/E' ratio 9.27 m/s    LV Systolic Volume Index 13.1 mL/m2    LV Diastolic Volume Index 41.10 mL/m2    LA Volume Index 24.8 mL/m2    LV Mass Index 69 g/m2    BSA 1.73 m2    Right Atrial Pressure (from IVC) 3 mmHg    Narrative    · The left ventricle is normal in size with normal systolic function. The   estimated ejection fraction is 68%.  · There is left ventricular concentric remodeling.  · Normal left ventricular diastolic function.  · Normal right ventricular systolic function.  · Mild aortic regurgitation.  · The MR is mild to mild-moderate (1-2+).  · Normal central venous pressure (3 mmHg).        Assessment and Plan:   NSTEMI (non-ST elevated myocardial infarction)  Assessment:  Patient is a 81 yo F presenting with SOB with elevated troponins. Patient has been managed according to ACS for NSTEMI. Left heart catheter showed no coronary narrowing.    Plan:  - ASA 81 mg  - DC heparin  - Atorvastain 40 mg QD  - metoprolol succinate 50 mg qd  - RBC transfusion if Hgb < 8 g/dL  - Routine post-cath care  - Continue home dose Aspirin 81 mg PO daily   - EKG when arrives to floor.   - Maximize medical management.  - Cardiac rehab referral, Continue medical management, Risk factor reduction, Follow-up with outpatient cardiologist    We thank you for this interesting consult. We will sign off at this time. Please contact us for any questions or concerns.    VTE Risk Mitigation (From admission, onward)         Ordered     IP VTE HIGH RISK PATIENT  Once      10/30/20 2131     Place sequential compression device  Until discontinued      10/30/20 2131     Place QUINCY hose  Until discontinued      10/30/20 2112                Remigio Wheeler MD   PGY - 1 Internal Medicine   Cardiology  [facility] Cardiac [facility]

## 2020-11-02 NOTE — HOSPITAL COURSE
Patient initiated on ACS pathway for possible NSTEMI. LHC today showed no narrowing warranting intervention. LHC findings include:  · The coronary arteries were normal.  · The filling pressures on the left were normal.  · The pre-procedure left ventricular end diastolic pressure was 6.  · The estimated blood loss was <50 mL.    Patient was managed medically.

## 2020-11-02 NOTE — PROGRESS NOTES
Vasc band removed at this time per Dr. Araujo. Bruising but no hematoma noted at site. No bleeding noted at site. Site dressing with gauze and tegaderm. Patient to be transferred back to room 602 at 1700 if no issues.

## 2020-11-02 NOTE — SUBJECTIVE & OBJECTIVE
Interval History: Patient went for [facility] today. No coronary narrowing. Patient asymptomatic on interview today. States she is anxious for her procedure and would like lorazepam. No chest pain/pressure, SOB, or worsening leg swelling.    Review of Systems   Constitution: Negative for chills and fever.   Eyes: Negative for pain and visual disturbance.   Cardiovascular: Negative for chest pain, irregular heartbeat, leg swelling, orthopnea, palpitations and paroxysmal nocturnal dyspnea.   Respiratory: Negative for cough, hemoptysis and shortness of breath.    Skin: Negative for dry skin and rash.   Musculoskeletal: Negative for falls and muscle cramps.   Gastrointestinal: Negative for abdominal pain, change in bowel habit, nausea and vomiting.   Genitourinary: Negative for dysuria and frequency.   Neurological: Negative for dizziness, focal weakness and light-headedness.     Objective:     Vital Signs (Most Recent):  Temp: 96.7 °F (35.9 °C) (11/02/20 0735)  Pulse: 62 (11/02/20 1530)  Resp: 16 (11/02/20 1530)  BP: (!) 149/67 (11/02/20 1530)  SpO2: 99 % (11/02/20 1530) Vital Signs (24h Range):  Temp:  [96.1 °F (35.6 °C)-97.8 °F (36.6 °C)] 96.7 °F (35.9 °C)  Pulse:  [52-76] 62  Resp:  [11-20] 16  SpO2:  [97 %-100 %] 99 %  BP: (125-169)/(60-81) 149/67     Weight: 60.8 kg (134 lb 0.6 oz)  Body mass index is 21.63 kg/m².     SpO2: 99 %  O2 Device (Oxygen Therapy): room air      Intake/Output Summary (Last 24 hours) at 11/2/2020 1619  Last data filed at 11/2/2020 1500  Gross per 24 hour   Intake 493.5 ml   Output --   Net 493.5 ml       Lines/Drains/Airways     Peripheral Intravenous Line                 Peripheral IV - Single Lumen 10/30/20 20 G Left Wrist 3 days         Peripheral IV - Single Lumen 10/30/20 2027 20 G Left Forearm 2 days                Physical Exam   Constitutional: She is oriented to person, place, and time. She appears well-developed and well-nourished. No distress.   HENT:   Head: Normocephalic and  atraumatic.   Eyes: Pupils are equal, round, and reactive to light. EOM are normal.   Neck: Normal range of motion. No JVD present.   Cardiovascular: Normal rate, regular rhythm, normal heart sounds and intact distal pulses. Exam reveals no gallop and no friction rub.   No murmur heard.  Pulmonary/Chest: Effort normal and breath sounds normal. No respiratory distress. She has no wheezes. She has no rales.   Abdominal: Soft. Bowel sounds are normal. She exhibits no distension. There is no abdominal tenderness.   Musculoskeletal: Normal range of motion.         General: No tenderness or edema.   Lymphadenopathy:     She has no cervical adenopathy.   Neurological: She is alert and oriented to person, place, and time.   Skin: Skin is warm and dry. She is not diaphoretic.   Vitals reviewed.      Significant Labs:   ABG: No results for input(s): PH, PCO2, HCO3, POCSATURATED, BE in the last 48 hours., BMP:   Recent Labs   Lab 11/01/20 0628 11/02/20 0422   GLU 90 97   * 138   K 3.4* 4.4    102   CO2 24 25   BUN 6* 5*   CREATININE 0.9 0.9   CALCIUM 7.6* 8.3*   MG 1.6 1.8   , CMP   Recent Labs   Lab 11/01/20 0628 11/02/20 0422   * 138   K 3.4* 4.4    102   CO2 24 25   GLU 90 97   BUN 6* 5*   CREATININE 0.9 0.9   CALCIUM 7.6* 8.3*   PROT 4.8* 5.3*   ALBUMIN 2.5* 2.8*   BILITOT 0.3 0.3   ALKPHOS 57 58   AST 25 26   ALT 10 12   ANIONGAP 11 11   ESTGFRAFRICA >60.0 >60.0   EGFRNONAA >60.0 >60.0   , CBC   Recent Labs   Lab 11/01/20 0628 11/02/20 0422   WBC 5.29 6.19   HGB 10.1* 10.7*   HCT 31.4* 33.1*    265   , INR No results for input(s): INR, PROTIME in the last 48 hours., Troponin No results for input(s): TROPONINI in the last 48 hours. and All pertinent lab results from the last 24 hours have been reviewed.    Significant Imaging: Echocardiogram:   2D echo with color flow doppler: No results found for this or any previous visit. and Transthoracic echo (TTE) complete (Cupid Only):   Results  for orders placed or performed during the hospital encounter of 10/30/20   Echo Color Flow Doppler? Yes   Result Value Ref Range    Ascending aorta 2.95 cm    STJ 2.09 cm    AV mean gradient 3 mmHg    Ao peak aurelio 1.23 m/s    Ao VTI 26.93 cm    IVRT 145.58 msec    IVS 0.93 0.6 - 1.1 cm    LA size 2.78 cm    Left Atrium Major Axis 5.19 cm    Left Atrium Minor Axis 5.17 cm    LVIDd 4.02 3.5 - 6.0 cm    LVIDs 2.51 2.1 - 4.0 cm    LVOT diameter 1.94 cm    LVOT peak VTI 16.53 cm    Posterior Wall 0.96 0.6 - 1.1 cm    MV Peak A Aurelio 0.65 m/s    E wave decelartion time 324.55 msec    MV Peak E Aurelio 0.51 m/s    RA Major Axis 4.51 cm    RA Width 2.31 cm    RVDD 2.19 cm    Sinus 2.80 cm    TAPSE 1.47 cm    TDI LATERAL 0.06 m/s    TDI SEPTAL 0.05 m/s    LA WIDTH 3.49 cm    MV stenosis pressure 1/2 time 94.12 ms    LV Diastolic Volume 70.86 mL    LV Systolic Volume 22.58 mL    RV S' 9.22 cm/s    LVOT peak aurelio 0.69 m/s    LV LATERAL E/E' RATIO 8.50 m/s    LV SEPTAL E/E' RATIO 10.20 m/s    FS 38 %    LA volume 42.72 cm3    LV mass 118.29 g    Left Ventricle Relative Wall Thickness 0.48 cm    AV valve area 1.81 cm2    AV Velocity Ratio 0.56     AV index (prosthetic) 0.61     MV valve area p 1/2 method 2.34 cm2    E/A ratio 0.78     Mean e' 0.06 m/s    LVOT area 3.0 cm2    LVOT stroke volume 48.84 cm3    AV peak gradient 6 mmHg    E/E' ratio 9.27 m/s    LV Systolic Volume Index 13.1 mL/m2    LV Diastolic Volume Index 41.10 mL/m2    LA Volume Index 24.8 mL/m2    LV Mass Index 69 g/m2    BSA 1.73 m2    Right Atrial Pressure (from IVC) 3 mmHg    Narrative    · The left ventricle is normal in size with normal systolic function. The   estimated ejection fraction is 68%.  · There is left ventricular concentric remodeling.  · Normal left ventricular diastolic function.  · Normal right ventricular systolic function.  · Mild aortic regurgitation.  · The MR is mild to mild-moderate (1-2+).  · Normal central venous pressure (3 mmHg).

## 2020-11-02 NOTE — NURSING
Vasc band deflated without bleed.  Removed vasc band, hematoma observed.  Pressure held x10 minutes.  Dr Araujo at bedside.  Vasc band replaced to be removed in 45 minutes.  Dr Araujo will return to assess patient.    Will continue to monitor.

## 2020-11-02 NOTE — NURSING TRANSFER
Nursing Transfer Note      11/2/2020     Transfer To: room 602    Transfer via stretcher    Transfer with cardiac monitoring    Transported by [facility] Wolf    Medicines sent: respiratory meds    Chart send with patient: Yes    Notified: daughter at bedside    Patient reassessed at: see epic (date, time)

## 2020-11-02 NOTE — PROGRESS NOTES
Report from ELLEN Leija to assume care of patient now. Right wrist site bruised but CDI with vasc band intact. Dr. Araujo will come to bedside to remove vasc band. Patient is AAOx3, vital signs stable, denies pain. Daughter at bedside. Patient to be transferred back to room 602 when appropriate.

## 2020-11-02 NOTE — SUBJECTIVE & OBJECTIVE
Past Medical History:   Diagnosis Date    Arthritis     Cataract     Glaucoma     Hyperlipidemia     Hypertension     Sleep apnea        Past Surgical History:   Procedure Laterality Date    APPENDECTOMY      BREAST SURGERY      right breast lumpectomy    CHOLECYSTECTOMY      HAND SURGERY Right        Review of patient's allergies indicates:   Allergen Reactions    Penicillins Itching    Codeine Nausea Only       PTA Medications   Medication Sig    albuterol (PROVENTIL HFA) 90 mcg/actuation inhaler Inhale 2 puffs into the lungs every 6 (six) hours as needed for Wheezing. Rescue    aspirin (ECOTRIN) 81 MG EC tablet Take 81 mg by mouth once daily.    atorvastatin (LIPITOR) 20 MG tablet Take 20 mg by mouth once daily.    calcium carbonate (OS-MUNIRA) 600 mg calcium (1,500 mg) Tab Take 600 mg by mouth once.    calcium-vitamin D3 500 mg(1,250mg) -200 unit per tablet Take 1 tablet by mouth 2 (two) times daily with meals.    cyanocobalamin (VITAMIN B-12) 1000 MCG tablet Take 100 mcg by mouth once daily.    folic acid (FOLVITE) 400 MCG tablet Take 400 mcg by mouth once daily.    ginkgo biloba 40 mg Tab Take by mouth.    ibuprofen (ADVIL,MOTRIN) 600 MG tablet Take 600 mg by mouth every 6 (six) hours as needed for Pain.    icosapent ethyl (VASCEPA) 1 gram Cap Take 1 tablet by mouth 2 (two) times daily.    Lactobac no.41-Bifidobact no.7 (PROBIOTIC-10) 70 mg (3 billion cell) Cap Take by mouth.    latanoprost 0.005 % ophthalmic solution 1 drop every evening.    losartan (COZAAR) 50 MG tablet Take 50 mg by mouth once daily.    METFORMIN/AA 7/DCFZ043/CHOLINE (METFORMIN-AA7-UPCMDX258-FTCZJS ORAL) Take by mouth.    multivitamin-Ca-iron-minerals 18-0.4 mg Tab Take 1 tablet by mouth.    nabumetone (RELAFEN) 750 MG tablet Take 750 mg by mouth 2 (two) times daily.    omega 3,6,9 combination no.7 92 mg (43 mg-22 bn-22rd-79pf) Chew Take by mouth.    omeprazole-sodium bicarbonate (ZEGERID) 40-1.1 mg-gram per  capsule Take 1 capsule by mouth before breakfast.    sertraline (ZOLOFT) 100 MG tablet Take 100 mg by mouth once daily.    [DISCONTINUED] simvastatin (ZOCOR) 40 MG tablet Take 40 mg by mouth every evening.     Family History     None        Tobacco Use    Smoking status: Light Tobacco Smoker     Types: Vaping w/o nicotine    Smokeless tobacco: Never Used   Substance and Sexual Activity    Alcohol use: Yes    Drug use: No    Sexual activity: Not on file     Review of Systems   Constitution: Negative for chills, decreased appetite, diaphoresis, fever, malaise/fatigue, night sweats, weight gain and weight loss.   Eyes: Negative.    Cardiovascular: Negative for chest pain, irregular heartbeat, leg swelling, near-syncope, orthopnea, palpitations, paroxysmal nocturnal dyspnea and syncope.   Respiratory: Negative for cough, shortness of breath, sputum production and wheezing.    Endocrine: Negative.    Hematologic/Lymphatic: Negative for bleeding problem.   Skin: Negative for color change, flushing, rash and suspicious lesions.   Musculoskeletal: Negative.    Gastrointestinal: Negative for bloating, abdominal pain, change in bowel habit, constipation, diarrhea, heartburn, melena, nausea and vomiting.   Genitourinary: Negative for flank pain, frequency, hematuria, incomplete emptying and urgency.   Neurological: Negative for dizziness, focal weakness, headaches, light-headedness, numbness, paresthesias, seizures, sensory change and weakness.   Psychiatric/Behavioral: Negative for altered mental status. The patient is not nervous/anxious.      Objective:     Vital Signs (Most Recent):  Temp: 97.8 °F (36.6 °C) (11/01/20 2016)  Pulse: (!) 58 (11/01/20 2016)  Resp: 18 (11/01/20 2016)  BP: 125/71 (11/01/20 2016)  SpO2: 97 % (11/01/20 2016) Vital Signs (24h Range):  Temp:  [96.1 °F (35.6 °C)-98.2 °F (36.8 °C)] 97.8 °F (36.6 °C)  Pulse:  [47-58] 58  Resp:  [12-18] 18  SpO2:  [97 %-98 %] 97 %  BP: (119-179)/(67-83) 125/71      Weight: 60.8 kg (134 lb 0.6 oz)  Body mass index is 21.63 kg/m².    SpO2: 97 %  O2 Device (Oxygen Therapy): room air      Intake/Output Summary (Last 24 hours) at 11/1/2020 2044  Last data filed at 11/1/2020 0800  Gross per 24 hour   Intake 237 ml   Output 300 ml   Net -63 ml       Lines/Drains/Airways     Peripheral Intravenous Line                 Peripheral IV - Single Lumen 10/30/20 20 G Left Wrist 2 days         Peripheral IV - Single Lumen 10/30/20 2027 20 G Left Forearm 2 days                Physical Exam   Constitutional: She is oriented to person, place, and time. She appears well-developed and well-nourished. No distress.   HENT:   Head: Normocephalic and atraumatic.   Mouth/Throat: Oropharynx is clear and moist.   Eyes: Pupils are equal, round, and reactive to light. EOM are normal.   Neck: Normal range of motion. Neck supple. No JVD present.   Cardiovascular: Normal rate and regular rhythm. Exam reveals no gallop and no friction rub.   No murmur heard.  Pulses:       Radial pulses are 2+ on the right side and 2+ on the left side.        Femoral pulses are 2+ on the right side and 2+ on the left side.       Dorsalis pedis pulses are 2+ on the right side and 2+ on the left side.        Posterior tibial pulses are 2+ on the right side and 2+ on the left side.   Pulmonary/Chest: Effort normal and breath sounds normal. No respiratory distress. She has no wheezes. She has no rales. She exhibits no tenderness.   Abdominal: Soft. Bowel sounds are normal. She exhibits no distension. There is no abdominal tenderness.   Musculoskeletal: Normal range of motion.         General: No edema.   Lymphadenopathy:     She has no cervical adenopathy.   Neurological: She is alert and oriented to person, place, and time.   Skin: Skin is warm and dry. No erythema.   Psychiatric: She has a normal mood and affect. Her behavior is normal. Judgment and thought content normal.       Significant Labs: Reviewed in  EPIC.    Significant Imaging: Reviewed in EPIC.

## 2020-11-03 VITALS
HEIGHT: 66 IN | TEMPERATURE: 98 F | BODY MASS INDEX: 21.55 KG/M2 | OXYGEN SATURATION: 94 % | WEIGHT: 134.06 LBS | HEART RATE: 59 BPM | SYSTOLIC BLOOD PRESSURE: 117 MMHG | DIASTOLIC BLOOD PRESSURE: 57 MMHG | RESPIRATION RATE: 16 BRPM

## 2020-11-03 LAB
ALBUMIN SERPL BCP-MCNC: 2.4 G/DL (ref 3.5–5.2)
ALP SERPL-CCNC: 56 U/L (ref 55–135)
ALT SERPL W/O P-5'-P-CCNC: 13 U/L (ref 10–44)
ANION GAP SERPL CALC-SCNC: 8 MMOL/L (ref 8–16)
AST SERPL-CCNC: 28 U/L (ref 10–40)
BASOPHILS # BLD AUTO: 0.04 K/UL (ref 0–0.2)
BASOPHILS NFR BLD: 0.7 % (ref 0–1.9)
BILIRUB SERPL-MCNC: 0.4 MG/DL (ref 0.1–1)
BUN SERPL-MCNC: 4 MG/DL (ref 8–23)
CALCIUM SERPL-MCNC: 8.3 MG/DL (ref 8.7–10.5)
CHLORIDE SERPL-SCNC: 104 MMOL/L (ref 95–110)
CO2 SERPL-SCNC: 25 MMOL/L (ref 23–29)
CREAT SERPL-MCNC: 0.9 MG/DL (ref 0.5–1.4)
DIFFERENTIAL METHOD: ABNORMAL
EOSINOPHIL # BLD AUTO: 0.3 K/UL (ref 0–0.5)
EOSINOPHIL NFR BLD: 4.4 % (ref 0–8)
ERYTHROCYTE [DISTWIDTH] IN BLOOD BY AUTOMATED COUNT: 12.8 % (ref 11.5–14.5)
EST. GFR  (AFRICAN AMERICAN): >60 ML/MIN/1.73 M^2
EST. GFR  (NON AFRICAN AMERICAN): >60 ML/MIN/1.73 M^2
GLUCOSE SERPL-MCNC: 84 MG/DL (ref 70–110)
HCT VFR BLD AUTO: 30.4 % (ref 37–48.5)
HGB BLD-MCNC: 10.1 G/DL (ref 12–16)
IMM GRANULOCYTES # BLD AUTO: 0.02 K/UL (ref 0–0.04)
IMM GRANULOCYTES NFR BLD AUTO: 0.4 % (ref 0–0.5)
LYMPHOCYTES # BLD AUTO: 2 K/UL (ref 1–4.8)
LYMPHOCYTES NFR BLD: 36 % (ref 18–48)
MAGNESIUM SERPL-MCNC: 1.7 MG/DL (ref 1.6–2.6)
MCH RBC QN AUTO: 33.1 PG (ref 27–31)
MCHC RBC AUTO-ENTMCNC: 33.2 G/DL (ref 32–36)
MCV RBC AUTO: 100 FL (ref 82–98)
MONOCYTES # BLD AUTO: 0.6 K/UL (ref 0.3–1)
MONOCYTES NFR BLD: 10.8 % (ref 4–15)
NEUTROPHILS # BLD AUTO: 2.7 K/UL (ref 1.8–7.7)
NEUTROPHILS NFR BLD: 47.7 % (ref 38–73)
NRBC BLD-RTO: 0 /100 WBC
PLATELET # BLD AUTO: 248 K/UL (ref 150–350)
PMV BLD AUTO: 9.2 FL (ref 9.2–12.9)
POCT GLUCOSE: 148 MG/DL (ref 70–110)
POTASSIUM SERPL-SCNC: 3.8 MMOL/L (ref 3.5–5.1)
PROT SERPL-MCNC: 4.8 G/DL (ref 6–8.4)
RBC # BLD AUTO: 3.05 M/UL (ref 4–5.4)
SODIUM SERPL-SCNC: 137 MMOL/L (ref 136–145)
WBC # BLD AUTO: 5.66 K/UL (ref 3.9–12.7)

## 2020-11-03 PROCEDURE — 83735 ASSAY OF MAGNESIUM: CPT

## 2020-11-03 PROCEDURE — 99239 PR HOSPITAL DISCHARGE DAY,>30 MIN: ICD-10-PCS | Mod: ,,, | Performed by: HOSPITALIST

## 2020-11-03 PROCEDURE — 25000003 PHARM REV CODE 250: Performed by: HOSPITALIST

## 2020-11-03 PROCEDURE — 99239 HOSP IP/OBS DSCHRG MGMT >30: CPT | Mod: ,,, | Performed by: HOSPITALIST

## 2020-11-03 PROCEDURE — 85025 COMPLETE CBC W/AUTO DIFF WBC: CPT

## 2020-11-03 PROCEDURE — 80053 COMPREHEN METABOLIC PANEL: CPT

## 2020-11-03 PROCEDURE — 36415 COLL VENOUS BLD VENIPUNCTURE: CPT

## 2020-11-03 PROCEDURE — 25000003 PHARM REV CODE 250: Performed by: FAMILY MEDICINE

## 2020-11-03 RX ORDER — LOSARTAN POTASSIUM 100 MG/1
100 TABLET ORAL DAILY
Qty: 90 TABLET | Refills: 3 | Status: SHIPPED | OUTPATIENT
Start: 2020-11-03 | End: 2023-03-13

## 2020-11-03 RX ORDER — LANOLIN ALCOHOL/MO/W.PET/CERES
400 CREAM (GRAM) TOPICAL ONCE
Status: COMPLETED | OUTPATIENT
Start: 2020-11-03 | End: 2020-11-03

## 2020-11-03 RX ADMIN — SERTRALINE HYDROCHLORIDE 100 MG: 50 TABLET ORAL at 08:11

## 2020-11-03 RX ADMIN — LOSARTAN POTASSIUM 100 MG: 50 TABLET, FILM COATED ORAL at 08:11

## 2020-11-03 RX ADMIN — ASPIRIN 81 MG: 81 TABLET, COATED ORAL at 08:11

## 2020-11-03 RX ADMIN — Medication 400 MG: at 08:11

## 2020-11-03 NOTE — PLAN OF CARE
Pt dc home with [facility]        11/03/20 1557   Final Note   Assessment Type Final Discharge Note   Anticipated Discharge Disposition Home-Health   Hospital Follow Up  Appt(s) scheduled? Yes   Post-Acute Status   Post-Acute Authorization Home Health   Home Health Status Set-up Complete   Discharge Delays None known at this time   Verito Mccarthy, MSN  Case Management  Ext [phone]

## 2020-11-03 NOTE — PLAN OF CARE
11/03/20 0838   Post-Acute Status   Post-Acute Authorization HME;Home Health   HME Status Referrals Sent   Home Health Status Referrals Sent     HH orders sent. [facility] accepted Pt.

## 2020-11-03 NOTE — PLAN OF CARE
[facility]    HOME HEALTH ORDERS  FACE TO FACE ENCOUNTER    Patient Name: Ayaka Ellison  YOB: 1940    PCP: Primary Doctor No   PCP Address: None  PCP Phone Number: None  PCP Fax: None    Encounter Date: 11/03/2020    Admit to Home Health    Diagnoses:  Active Hospital Problems    Diagnosis  POA    *ACS (acute coronary syndrome) [I24.9]  Yes    Acute cystitis [N30.00]  Yes    Diabetes [E11.9]  Yes    NSTEMI (non-ST elevated myocardial infarction) [I21.4]  Yes      Resolved Hospital Problems   No resolved problems to display.       No future appointments.  Follow-up Information     [facility] 3rd [facility].    Specialty: Cardiology  Why: 1 month for follow up  Contact information:  [address]  [facility] [address] [phone]  Additional information:  [facility] - 3rd floor           primary care physician.    Why: 2 week follow up                   I have seen and examined this patient face to face today. My clinical findings that support the need for the home health skilled services and home bound status are the following:  Weakness/numbness causing balance and gait disturbance due to Coronary Heart Disease and Weakness/Debility making it taxing to leave home.  Requiring assistive device to leave home due to unsteady gait caused by  Coronary Artery Disease and Weakness/Debility.    Allergies:  Review of patient's allergies indicates:   Allergen Reactions    Penicillins Itching    Codeine Nausea Only       Diet: 2 gram sodium diet    Activities: activity as tolerated    Nursing:   SN to complete comprehensive assessment including routine vital signs. Instruct on disease process and s/s of complications to report to MD. Review/verify medication list sent home with the patient at time of discharge  and instruct patient/caregiver as needed. Frequency may be adjusted depending on start of care date.    Notify MD if SBP > 160  or < 90; DBP > 90 or < 50; HR > 120 or < 50; Temp > 101; Other:         CONSULTS:    Physical Therapy to evaluate and treat. Evaluate for home safety and equipment needs; Establish/upgrade home exercise program. Perform / instruct on therapeutic exercises, gait training, transfer training, and Range of Motion.  Occupational Therapy to evaluate and treat. Evaluate home environment for safety and equipment needs. Perform/Instruct on transfers, ADL training, ROM, and therapeutic exercises.  Aide to provide assistance with personal care, ADLs, and vital signs.    MISCELLANEOUS CARE:  N/A    WOUND CARE ORDERS  n/a      Medications: Review discharge medications with patient and family and provide education.      Current Discharge Medication List      START taking these medications    Details   acetaminophen (TYLENOL) 500 MG tablet Take 1 tablet (500 mg total) by mouth every 8 (eight) hours as needed.  Refills: 0         CONTINUE these medications which have CHANGED    Details   atorvastatin (LIPITOR) 40 MG tablet Take 1 tablet (40 mg total) by mouth every evening.  Qty: 90 tablet, Refills: 3      losartan (COZAAR) 100 MG tablet Take 1 tablet (100 mg total) by mouth once daily.  Qty: 90 tablet, Refills: 3    Comments: .         CONTINUE these medications which have NOT CHANGED    Details   albuterol (PROVENTIL HFA) 90 mcg/actuation inhaler Inhale 2 puffs into the lungs every 6 (six) hours as needed for Wheezing. Rescue  Qty: 6.7 g, Refills: 0    Associated Diagnoses: Acute bronchitis, unspecified organism      aspirin (ECOTRIN) 81 MG EC tablet Take 81 mg by mouth once daily.      calcium carbonate (OS-MUNIRA) 600 mg calcium (1,500 mg) Tab Take 600 mg by mouth once.      calcium-vitamin D3 500 mg(1,250mg) -200 unit per tablet Take 1 tablet by mouth 2 (two) times daily with meals.      cyanocobalamin (VITAMIN B-12) 1000 MCG tablet Take 100 mcg by mouth once daily.      folic acid (FOLVITE) 400 MCG tablet Take 400 mcg by mouth  once daily.      ginkgo biloba 40 mg Tab Take by mouth.      ibuprofen (ADVIL,MOTRIN) 600 MG tablet Take 600 mg by mouth every 6 (six) hours as needed for Pain.      icosapent ethyl (VASCEPA) 1 gram Cap Take 1 tablet by mouth 2 (two) times daily.      Lactobac no.41-Bifidobact no.7 (PROBIOTIC-10) 70 mg (3 billion cell) Cap Take by mouth.      latanoprost 0.005 % ophthalmic solution 1 drop every evening.      multivitamin-Ca-iron-minerals 18-0.4 mg Tab Take 1 tablet by mouth.      omega 3,6,9 combination no.7 92 mg (43 mg-22 ql-57ae-14jm) Chew Take by mouth.      sertraline (ZOLOFT) 100 MG tablet Take 100 mg by mouth once daily.         STOP taking these medications       METFORMIN/AA 7/OGPH634/CHOLINE (METFORMIN-AA7-[id number]-RJWFGP ORAL) Comments:   Reason for Stopping:         nabumetone (RELAFEN) 750 MG tablet Comments:   Reason for Stopping:         omeprazole-sodium bicarbonate (ZEGERID) 40-1.1 mg-gram per capsule Comments:   Reason for Stopping:         simvastatin (ZOCOR) 40 MG tablet Comments:   Reason for Stopping:               I certify that this patient is confined to her home and needs intermittent skilled nursing care, physical therapy and occupational therapy.

## 2020-11-03 NOTE — DISCHARGE SUMMARY
DISCHARGE SUMMARY  Hospital Medicine    Team: [facility] HOSP MED K    Patient Name: Ayaka Ellison  YOB: 1940    Admit Date: 10/30/2020    Discharge Date: 11/03/2020    Discharge Attending Physician: Danielle Jones MD     Principal Diagnoses:  Active Hospital Problems    Diagnosis  POA    *ACS (acute coronary syndrome) [I24.9]  Yes    Acute cystitis [N30.00]  Yes    Diabetes [E11.9]  Yes    NSTEMI (non-ST elevated myocardial infarction) [I21.4]  Yes      Resolved Hospital Problems   No resolved problems to display.       Discharged Condition: good     Interval history: reports feeling wel this AM, cath without obstructive CAD, no stents needed, treated for UTI, planning for  for discharge to help strengthening, okay for asa, lipitor on dc, home losartan increased for BP control as 180s-190s on home regimen on dc.     Temp:  [97.1 °F (36.2 °C)-97.9 °F (36.6 °C)]   Pulse:  [51-68]   Resp:  [11-18]   BP: (131-190)/(60-76)   SpO2:  [96 %-100 %]      Physical Exam:  Constitutional: Appears well-developed and well-nourished. pleasant, eating breakfast, asking when she can go home today.  Head: Normocephalic and atraumatic.   Mouth/Throat: Oropharynx is clear and moist.   Eyes: EOM are normal. Pupils are equal, round, and reactive to light. No scleral icterus.   Neck: Normal range of motion. Neck supple.   Cardiovascular: Normal rate and regular rhythm.  No murmur heard.  Pulmonary/Chest: Effort normal and breath sounds normal. No respiratory distress. No wheezes, rales, or rhonchi  Abdominal: Soft. Bowel sounds are normal.  No distension or tenderness  Musculoskeletal: Normal range of motion. No edema.   Neurological: Alert and oriented to person, place, and time.   Skin: Skin is warm and dry.   Psychiatric: Normal mood and affect. Behavior is normal.       HOSPITAL COURSE:      Initial Presentation:    This is a 79yo female with a past medical history of HTN, HLD, DM2 x 5 years, former tobacco  use (1/2 ppd x 20 years) who presents to the ED with chief complaint of nausea, vomiting, and exertional dyspnea. Patient reports that she has been experiencing progressively worsening exertional dyspnea for the last two weeks and also reports decreased appetite and po intake during this time. She states that three days ago she also developed new symptoms of nausea and vomiting and so she subsequently presented to the ED for further evaluation. In the ED patient afebrile and hemodynamically stable. UA consistent with UTI. BNP 63. Initial trop negative 0.024 and second set borderline positive at 0.028. EKG shows NSR with ST depressions in inferolateral leads (II, III, aVF, V4-V6) for which Cardiology is consulted. Cardiology saw and evaluated patient and concerned for ACS (UA/NSTEMI). Patient loaded with ASA and ticagrelor and started on hep gtt. She has also been started on ceftriaxone for UTI. She has been admitted to the care of medicine for further evaluation and management    Course of Principle Problem for Admission:    ACS (acute coronary syndrome)  NSTEMI  - presnted with recent dyspnea with exertion now at rest with consistent with spectrum of ACS- unstable angina/NSTEMI with suspected underlying lesion, wall motion abnormality in septum on echo  -asa, brilinta loaded, now asa daily and brilinta BID  -on hep drip pre-cath  -lipitor daily. HRs 40s on toprol 12.5 so have to hold now as didn't tolerate it appears--> rebounded to 60s off BB. Home ARB resumed for BP control.  -trop peak at .054. EKG with ST depressions on admit. LDL at goal of 60.  -if recurrent dyspnea or N/V (possible her equivalent of ACS)  -[facility] with non obstructive CAD< no stents placed, okay to dc on asa daily lipitor and BP control at home      Other Medical Problems Addressed in the Hospital:    Hypokalemia  Hypomagnesemia  -replaced PRN, boost breeze for supplement here. Appetite slowly improved during stay              Hx of Pre  Diabetes  - Hold home metformin in house  - SSI  - A1C 4.9 so not DM or Pre-DM now, daughter asked to switch to non DM diet, switched to Low Na on 11/1 and can continue diet control at home and resume metformin     Mild to Moderate Mitral Regurgitation  -seen on echo, likely not the cause for symptoms of dyspnea on exertion as also has wall motion abn more concerning as cause for symptoms  -will need cards OP f/u to monitor, can see general cards, non urgent        Acute cystitis  - given rocephin while here  - Urine CX NG so will treat 3 days total          Anxiety  -anxious about procedure  -ativan PRN added for her, improved now. Can stop on dc     HLD  -LDL at goal. Cont statin     Nausea and Vomiting  -no further issues since admit     Tobacco history  -nonsmoker now     Bradycardia  Med induced by toprol it appears  -hold BB now as risk of HR 40 higher than benefit in setting of ACS/presume CAD and rebounding to 60s without BB  -med induced, no BB on dc    Weakness  -likely UTi worsened at home as coronaries clean  -will have  PT/OT on dc per recs as improved weakness after UTI treatment. Feeling better and stronger now    Consults: cards    Last CBC/BMP:    CBC/Anemia Labs: Coags:    Recent Labs   Lab 11/01/20 0628 11/02/20 0422 11/03/20 0448   WBC 5.29 6.19 5.66   HGB 10.1* 10.7* 10.1*   HCT 31.4* 33.1* 30.4*    265 248   * 101* 100*   RDW 12.8 12.8 12.8   FERRITIN  --  453*  --    FOLATE  --  14.7  --    [id number]  --  1392*  --     Recent Labs   Lab 10/30/20  2027  11/01/20  1753 11/01/20  2337 11/02/20 0422   INR 1.1  --   --   --   --    APTT 26.5   < > 47.2* 45.2* 40.2*    < > = values in this interval not displayed.        Chemistries:   Recent Labs   Lab 11/01/20 0628 11/02/20 0422 11/03/20 0448   * 138 137   K 3.4* 4.4 3.8    102 104   CO2 24 25 25   BUN 6* 5* 4*   CREATININE 0.9 0.9 0.9   CALCIUM 7.6* 8.3* 8.3*   PROT 4.8* 5.3* 4.8*   BILITOT 0.3 0.3 0.4   ALKPHOS  57 58 56   ALT 10 12 13   AST 25 26 28   MG 1.6 1.8 1.7            Special Treatments/Procedures:   Procedure(s) (LRB):  Angiogram, Coronary, with Left Heart Cath (Left)     Disposition: [place] [facility]      Future Scheduled Appointments:  No future appointments.        Discharge Medication List:       EDUARDO Ellison   Home Medication Instructions SOUMYA:[id number]    Printed on:11/03/20 4742   Medication Information                      acetaminophen (TYLENOL) 500 MG tablet  Take 1 tablet (500 mg total) by mouth every 8 (eight) hours as needed.             albuterol (PROVENTIL HFA) 90 mcg/actuation inhaler  Inhale 2 puffs into the lungs every 6 (six) hours as needed for Wheezing. Rescue             aspirin (ECOTRIN) 81 MG EC tablet  Take 81 mg by mouth once daily.             atorvastatin (LIPITOR) 40 MG tablet  Take 1 tablet (40 mg total) by mouth every evening.             calcium carbonate (OS-MUNIRA) 600 mg calcium (1,500 mg) Tab  Take 600 mg by mouth once.             calcium-vitamin D3 500 mg(1,250mg) -200 unit per tablet  Take 1 tablet by mouth 2 (two) times daily with meals.             cyanocobalamin (VITAMIN B-12) 1000 MCG tablet  Take 100 mcg by mouth once daily.             folic acid (FOLVITE) 400 MCG tablet  Take 400 mcg by mouth once daily.             ginkgo biloba 40 mg Tab  Take by mouth.             ibuprofen (ADVIL,MOTRIN) 600 MG tablet  Take 600 mg by mouth every 6 (six) hours as needed for Pain.             icosapent ethyl (VASCEPA) 1 gram Cap  Take 1 tablet by mouth 2 (two) times daily.             Lactobac no.41-Bifidobact no.7 (PROBIOTIC-10) 70 mg (3 billion cell) Cap  Take by mouth.             latanoprost 0.005 % ophthalmic solution  1 drop every evening.             losartan (COZAAR) 100 MG tablet  Take 1 tablet (100 mg total) by mouth once daily.             multivitamin-Ca-iron-minerals 18-0.4 mg Tab  Take 1 tablet by mouth.             omega 3,6,9 combination no.7 92 mg (43 mg-22  "[id number]) Chew  Take by mouth.             sertraline (ZOLOFT) 100 MG tablet  Take 100 mg by mouth once daily.                 Patient Instructions:  Discharge Procedure Orders   WALKER FOR HOME USE     Order Specific Question Answer Comments   Type of Walker: Rollator with brakes and/or seat    With wheels? Yes    Height: 5' 6" (1.676 m)    Weight: 60.8 kg (134 lb 0.6 oz)    Length of need (1-99 months): 99    Does patient have medical equipment at home? none    Please check all that apply: Patient's condition impairs ambulation.    Please check all that apply: Patient needs help to get in and out of chair.        At the time of discharge patient was told to take all medications as prescribed, to keep all followup appointments, and to call their primary care physician or return to the emergency room if they have any worsening or concerning symptoms.    Signing Physician:  Danielle Jones MD  "

## 2020-11-03 NOTE — PLAN OF CARE
Pt AAOx4, VSS, free of pain, falls and injuries. Tele monitoring maintained. Neuro checks maintained. I/O maintained. D/c education done. Tele monitoring removed, Iv access removed, Pt awaiting transport home.   Problem: Fall Injury Risk  Goal: Absence of Fall and Fall-Related Injury  Outcome: Adequate for Care Transition  Intervention: Identify and Manage Contributors to Fall Injury Risk  Flowsheets (Taken 11/3/2020 1506)  Self-Care Promotion:   independence encouraged   BADL personal objects within reach   BADL personal routines maintained   safe use of adaptive equipment encouraged   meal setup provided  Medication Review/Management:   medications reviewed   high risk medications identified  Intervention: Promote Injury-Free Environment  Flowsheets (Taken 11/3/2020 1506)  Safety Promotion/Fall Prevention:   assistive device/personal item within reach   bed alarm set   commode/urinal/bedpan at bedside   diversional activities provided   Fall Risk reviewed with patient/family   Fall Risk signage in place   high risk medications identified   lighting adjusted   medications reviewed   nonskid shoes/socks when out of bed   side rails raised x 2   instructed to call staff for mobility  Environmental Safety Modification:   assistive device/personal items within reach   clutter free environment maintained   lighting adjusted   room organization consistent     Problem: Adult Inpatient Plan of Care  Goal: Plan of Care Review  Outcome: Adequate for Care Transition  Flowsheets (Taken 11/3/2020 1506)  Plan of Care Reviewed With:   patient   daughter  Goal: Patient-Specific Goal (Individualization)  Outcome: Adequate for Care Transition  Goal: Absence of Hospital-Acquired Illness or Injury  Outcome: Adequate for Care Transition  Intervention: Identify and Manage Fall Risk  Flowsheets (Taken 11/3/2020 1506)  Safety Promotion/Fall Prevention:   assistive device/personal item within reach   bed alarm set   commode/urinal/bedpan  at bedside   diversional activities provided   Fall Risk reviewed with patient/family   Fall Risk signage in place   high risk medications identified   lighting adjusted   medications reviewed   nonskid shoes/socks when out of bed   side rails raised x 2   instructed to call staff for mobility  Intervention: Prevent VTE (venous thromboembolism)  Flowsheets (Taken 11/3/2020 1506)  VTE Prevention/Management:   ambulation promoted   bleeding precautions maintained   bleeding risk assessed   bleeding risk factor(s) identified, provider notified   fluids promoted   ROM (active) performed   ROM (passive) performed   dorsiflexion/plantar flexion performed  Goal: Optimal Comfort and Wellbeing  Outcome: Adequate for Care Transition  Intervention: Provide Person-Centered Care  Flowsheets (Taken 11/3/2020 1506)  Trust Relationship/Rapport:   care explained   questions encouraged   choices provided   reassurance provided   emotional support provided   thoughts/feelings acknowledged   empathic listening provided   questions answered  Goal: Readiness for Transition of Care  Outcome: Adequate for Care Transition  Goal: Rounds/Family Conference  Outcome: Adequate for Care Transition     Problem: Diabetes Comorbidity  Goal: Blood Glucose Level Within Desired Range  Outcome: Adequate for Care Transition

## 2020-11-04 DIAGNOSIS — I34.0 MODERATE MITRAL REGURGITATION: Primary | ICD-10-CM

## 2020-11-05 ENCOUNTER — PATIENT OUTREACH (OUTPATIENT)
Dept: ADMINISTRATIVE | Facility: CLINIC | Age: 80
End: 2020-11-05

## 2020-11-05 NOTE — PROGRESS NOTES
C3 nurse attempted to contact patient. No answer.  C3 nurse attempted to contact Ayaka CATIA Ellison  for a TCC post hospital discharge follow up call. No answer at phone number listed and no voicemail available. The patient does not have a scheduled HOSFU appointment within 7-14 days post hospital discharge date 11/03/2020 Message sent to Physician staff to assist with HOSFU appointment scheduling.

## 2021-01-10 ENCOUNTER — IMMUNIZATION (OUTPATIENT)
Dept: INTERNAL MEDICINE | Facility: CLINIC | Age: 81
End: 2021-01-10
Payer: MEDICARE

## 2021-01-10 DIAGNOSIS — Z23 NEED FOR VACCINATION: ICD-10-CM

## 2021-01-10 PROCEDURE — 91300 COVID-19, MRNA, LNP-S, PF, 30 MCG/0.3 ML DOSE VACCINE: CPT | Mod: PBBFAC,PO

## 2021-01-31 ENCOUNTER — IMMUNIZATION (OUTPATIENT)
Dept: INTERNAL MEDICINE | Facility: CLINIC | Age: 81
End: 2021-01-31
Payer: MEDICARE

## 2021-01-31 DIAGNOSIS — Z23 NEED FOR VACCINATION: Primary | ICD-10-CM

## 2021-01-31 PROCEDURE — 91300 COVID-19, MRNA, LNP-S, PF, 30 MCG/0.3 ML DOSE VACCINE: CPT | Mod: PBBFAC | Performed by: FAMILY MEDICINE

## 2021-01-31 PROCEDURE — 0002A COVID-19, MRNA, LNP-S, PF, 30 MCG/0.3 ML DOSE VACCINE: CPT | Mod: PBBFAC | Performed by: FAMILY MEDICINE

## 2021-06-23 ENCOUNTER — OFFICE VISIT (OUTPATIENT)
Dept: URGENT CARE | Facility: CLINIC | Age: 81
End: 2021-06-23
Payer: MEDICARE

## 2021-06-23 VITALS
TEMPERATURE: 98 F | SYSTOLIC BLOOD PRESSURE: 138 MMHG | DIASTOLIC BLOOD PRESSURE: 77 MMHG | OXYGEN SATURATION: 98 % | RESPIRATION RATE: 16 BRPM | HEIGHT: 66 IN | HEART RATE: 76 BPM | WEIGHT: 134 LBS | BODY MASS INDEX: 21.53 KG/M2

## 2021-06-23 DIAGNOSIS — R11.0 NAUSEA: Primary | ICD-10-CM

## 2021-06-23 DIAGNOSIS — N39.0 URINARY TRACT INFECTION WITHOUT HEMATURIA, SITE UNSPECIFIED: ICD-10-CM

## 2021-06-23 DIAGNOSIS — E86.0 DEHYDRATION: ICD-10-CM

## 2021-06-23 LAB
BILIRUB UR QL STRIP: POSITIVE
GLUCOSE UR QL STRIP: NEGATIVE
KETONES UR QL STRIP: NEGATIVE
LEUKOCYTE ESTERASE UR QL STRIP: NEGATIVE
PH, POC UA: 9 (ref 5–8)
POC BLOOD, URINE: NEGATIVE
POC NITRATES, URINE: NEGATIVE
PROT UR QL STRIP: POSITIVE
SP GR UR STRIP: 1 (ref 1–1.03)
UROBILINOGEN UR STRIP-ACNC: NORMAL (ref 0.1–1.1)

## 2021-06-23 PROCEDURE — 99214 OFFICE O/P EST MOD 30 MIN: CPT | Mod: 25,S$GLB,, | Performed by: PHYSICIAN ASSISTANT

## 2021-06-23 PROCEDURE — 81003 POCT URINALYSIS, DIPSTICK, AUTOMATED, W/O SCOPE: ICD-10-PCS | Mod: QW,S$GLB,, | Performed by: PHYSICIAN ASSISTANT

## 2021-06-23 PROCEDURE — 87086 URINE CULTURE/COLONY COUNT: CPT | Performed by: PHYSICIAN ASSISTANT

## 2021-06-23 PROCEDURE — 99214 PR OFFICE/OUTPT VISIT, EST, LEVL IV, 30-39 MIN: ICD-10-PCS | Mod: 25,S$GLB,, | Performed by: PHYSICIAN ASSISTANT

## 2021-06-23 PROCEDURE — 81003 URINALYSIS AUTO W/O SCOPE: CPT | Mod: QW,S$GLB,, | Performed by: PHYSICIAN ASSISTANT

## 2021-06-23 RX ORDER — ONDANSETRON 4 MG/1
4 TABLET, ORALLY DISINTEGRATING ORAL ONCE
Qty: 1 TABLET | Refills: 0 | Status: SHIPPED | OUTPATIENT
Start: 2021-06-23 | End: 2021-06-23

## 2021-06-25 ENCOUNTER — TELEPHONE (OUTPATIENT)
Dept: URGENT CARE | Facility: CLINIC | Age: 81
End: 2021-06-25

## 2021-06-25 LAB
BACTERIA UR CULT: NORMAL
BACTERIA UR CULT: NORMAL

## 2021-09-13 ENCOUNTER — HOSPITAL ENCOUNTER (EMERGENCY)
Facility: HOSPITAL | Age: 81
Discharge: HOME | End: 2021-09-13
Attending: EMERGENCY MEDICINE
Payer: MEDICARE

## 2021-09-13 VITALS
OXYGEN SATURATION: 98 % | RESPIRATION RATE: 16 BRPM | TEMPERATURE: 98 F | SYSTOLIC BLOOD PRESSURE: 144 MMHG | HEART RATE: 55 BPM | DIASTOLIC BLOOD PRESSURE: 61 MMHG
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
## 2021-09-13 DIAGNOSIS — S09.90XA CLOSED HEAD INJURY, INITIAL ENCOUNTER: Primary | ICD-10-CM

## 2021-09-13 PROCEDURE — 90471 IMMUNIZATION ADMIN: CPT | Performed by: EMERGENCY MEDICINE

## 2021-09-13 PROCEDURE — 99284 EMERGENCY DEPT VISIT MOD MDM: CPT | Mod: 25,,, | Performed by: EMERGENCY MEDICINE

## 2021-09-13 PROCEDURE — 63600175 PHARM REV CODE 636 W HCPCS: Performed by: EMERGENCY MEDICINE

## 2021-09-13 PROCEDURE — 99284 PR EMERGENCY DEPT VISIT,LEVEL IV: ICD-10-PCS | Mod: 25,,, | Performed by: EMERGENCY MEDICINE

## 2021-09-13 PROCEDURE — 12002 RPR S/N/AX/GEN/TRNK2.6-7.5CM: CPT

## 2021-09-13 PROCEDURE — 99284 EMERGENCY DEPT VISIT MOD MDM: CPT | Mod: 25

## 2021-09-13 PROCEDURE — 90715 TDAP VACCINE 7 YRS/> IM: CPT | Performed by: EMERGENCY MEDICINE

## 2021-09-13 PROCEDURE — 12002 RPR S/N/AX/GEN/TRNK2.6-7.5CM: CPT | Mod: ,,, | Performed by: EMERGENCY MEDICINE

## 2021-09-13 PROCEDURE — 12002 PR RESUP NPTERF WND BODY 2.6-7.5 CM: ICD-10-PCS | Mod: ,,, | Performed by: EMERGENCY MEDICINE

## 2021-09-13 RX ORDER — LIDOCAINE HYDROCHLORIDE AND EPINEPHRINE 10; 10 MG/ML; UG/ML
10 INJECTION, SOLUTION INFILTRATION; PERINEURAL ONCE
Status: DISCONTINUED | OUTPATIENT
Start: 2021-09-13 | End: 2021-09-14 | Stop reason: HOSPADM

## 2021-09-13 RX ADMIN — TETANUS TOXOID, REDUCED DIPHTHERIA TOXOID AND ACELLULAR PERTUSSIS VACCINE, ADSORBED 0.5 ML: 5; 2.5; 8; 8; 2.5 SUSPENSION INTRAMUSCULAR at 09:09

## 2021-09-21 ENCOUNTER — CLINICAL SUPPORT (OUTPATIENT)
Dept: URGENT CARE | Facility: CLINIC | Age: 81
End: 2021-09-21
Payer: MEDICARE

## 2021-09-21 VITALS
RESPIRATION RATE: 18 BRPM | SYSTOLIC BLOOD PRESSURE: 137 MMHG | WEIGHT: 140 LBS | DIASTOLIC BLOOD PRESSURE: 82 MMHG | HEIGHT: 66 IN | OXYGEN SATURATION: 96 % | TEMPERATURE: 98 F | BODY MASS INDEX: 22.5 KG/M2 | HEART RATE: 63 BPM

## 2021-09-21 DIAGNOSIS — S01.01XD SCALP LACERATION, SUBSEQUENT ENCOUNTER: Primary | ICD-10-CM

## 2021-09-21 PROCEDURE — 99211 PR OFFICE/OUTPT VISIT, EST, LEVL I: ICD-10-PCS | Mod: S$GLB,,, | Performed by: PHYSICIAN ASSISTANT

## 2021-09-21 PROCEDURE — 99211 OFF/OP EST MAY X REQ PHY/QHP: CPT | Mod: S$GLB,,, | Performed by: PHYSICIAN ASSISTANT

## 2022-05-11 NOTE — PROGRESS NOTES
Progress Note   Hospital Medicine         Patient Name: Ayaka Ellison  MRN:  [id number]  [facility] Medicine Team: [facility] K Danielle Jones MD  Date of Admission:  10/30/2020     Length of Stay:  LOS: 2 days   Expected Discharge Date:   Principal Problem:  ACS (acute coronary syndrome)       Subjective:     Interval History/Overnight Events:      She reports feeling well overnight, no Nausea or chest pain or dyspnea. Relaxed more after benzo and anxiety improved, slept well. In good spirits this AM, ready for cath. Discussed plan depending on what the results are will determine dispo but most people if they need a stent stay overnight for monitoring, will follow results  HR was 40s on 12.5 toprol so held, is rebounding now to 60s. Did not tolerate so hold, but is on all other med management now- statin, arb, asa, brillinta.        Review of Systems   Constitutional: Negative for chills,+ for poor appetite and fatigue, fever.   HENT: Negative for sore throat, trouble swallowing.    Eyes: Negative for photophobia, visual disturbance.   Respiratory: Negative for cough, + shortness of breath.    Cardiovascular: Negative for chest pain, palpitations, leg swelling.   Gastrointestinal: Negative for abdominal pain, constipation, diarrhea,+  nausea, vomiting.   Endocrine: Negative for cold intolerance, heat intolerance.   Genitourinary: Negative for dysuria,+  frequency.   Musculoskeletal: Negative for arthralgias, myalgias.   Skin: Negative for rash, wound, erythema   Neurological: Negative for dizziness, syncope, + weakness, light-headedness.   Psychiatric/Behavioral: Negative for confusion, hallucinations, anxiety  All other systems reviewed and are negative.    Objective:     Temp:  [96.1 °F (35.6 °C)-98.1 °F (36.7 °C)]   Pulse:  [52-76]   Resp:  [12-20]   BP: (125-169)/(71-81)   SpO2:  [97 %-98 %]       Physical Exam:  Constitutional: Appears well-developed and well-nourished. pleasant, calm, anxiety  [Cessation strategies including cessation program discussed] : Cessation strategies including cessation program discussed [Encouraged to pick a quit date and identify support needed to quit] : Encouraged to pick a quit date and identify support needed to quit improved.  Head: Normocephalic and atraumatic.   Mouth/Throat: Oropharynx is clear and moist.   Eyes: EOM are normal. Pupils are equal, round, and reactive to light. No scleral icterus.   Neck: Normal range of motion. Neck supple.   Cardiovascular: Normal rate and regular rhythm.  No murmur heard.  Pulmonary/Chest: Effort normal and breath sounds normal. No respiratory distress. No wheezes, rales, or rhonchi  Abdominal: Soft. Bowel sounds are normal.  No distension or tenderness  Musculoskeletal: Normal range of motion. No edema.   Neurological: Alert and oriented to person, place, and time.   Skin: Skin is warm and dry.   Psychiatric: Normal mood and affect. Behavior is normal.     Recent Labs   Lab 10/30/20  1355 10/30/20  2027 10/31/20  0631 11/01/20  0628 11/02/20  0422   WBC 8.56 7.25 7.48  7.48 5.29 6.19   HGB 12.5 11.9* 11.4*  11.4* 10.1* 10.7*   HCT 37.5 34.8* 35.2*  35.2* 31.4* 33.1*    261 264  264 226 265     Recent Labs   Lab 10/31/20  0631 11/01/20  0628 11/02/20  0422    135* 138   K 3.4* 3.4* 4.4    100 102   CO2 23 24 25   BUN 9 6* 5*   CREATININE 1.0 0.9 0.9   GLU 96 90 97   CALCIUM 8.0* 7.6* 8.3*   MG 1.9 1.6 1.8     Recent Labs   Lab 10/30/20  2027 10/31/20  0631 11/01/20  0628 11/02/20  0422   ALKPHOS  --  58 57 58   ALT  --  9* 10 12   AST  --  25 25 26   ALBUMIN  --  2.7* 2.5* 2.8*   PROT  --  5.2* 4.8* 5.3*   BILITOT  --  0.3 0.3 0.3   INR 1.1  --   --   --      Recent Labs   Lab 10/31/20  1246 10/31/20  1727 10/31/20  2140 11/01/20  0945 11/01/20  1231 11/02/20  0706   POCTGLUCOSE 115* 106 105 103 114* 101        aspirin  81 mg Oral Daily    atorvastatin  40 mg Oral QHS    cefTRIAXone (ROCEPHIN) IVPB  1 g Intravenous Q24H    losartan  50 mg Oral Daily    magnesium oxide  400 mg Oral Once    sertraline  100 mg Oral Daily    ticagrelor  90 mg Oral BID       Assessment and Plan     Ms. Ayaka Ellison is a 80 y.o. female who presented to [facility] on 10/30/2020  [Yes] : Willing to quit smoking with     ACS (acute coronary syndrome)  NSTEMI  - presnted with recent dyspnea with exertion now at rest with consistent with spectrum of ACS- unstable angina/NSTEMI with suspected underlying lesion, wall motion abnormality in septum on echo  -asa, brilinta loaded, now asa daily and brilinta BID  -on hep drip pre-cath  -lipitor daily. HRs 40s on toprol 12.5 so have to hold now as didn't tolerate it appears--> rebounded to 60s off BB. Home ARB resumed for BP control.  -trop peak at .054. EKG with ST depressions on admit. LDL at goal of 60.  -if recurrent dyspnea or N/V (possible her equivalent of ACS), repeat EKG.trop stat and consider nitro PRN  -[facility] today- will f/u results    Hypokalemia  Hypomagnesemia  -goal 4 and 2, replace PRN  -appetite is poor, boost breeze added 11/1            Hx of Pre Diabetes  - Hold home metformin  - SSI  - A1C 4.9 so not DM or Pre-DM now, daughter asked to switch to non DM diet, switched to Low Na on 11/1    Mild to Moderate Mitral Regurgitation  -seen on echo, likely not the cause for symptoms of dyspnea on exertion as also has wall motion abn more concerning as cause for symptoms  -trend  -will need cards OP f/u to monitor        Acute cystitis  - continue Ceftriaxone  - Urine CX NG so will treat 3 days total    Weakness  -likely from angina suspected at home prior ot admit  -PT/OT rec home, will work on rollator for her at home per daughter preference given fall at home recently and likely HH as well.    Anxiety  -anxious about procedure tomorrow  -ativan PRN added for her, improved now.    HLD  -LDL at goal. Cont statin    Nausea and Vomiting  -suspect may be her angina equivalent  -if reoccurs, has phenergan and recheck EKG, trop, consider nitro  -no further issues since admit    Tobacco history  -nonsmoker now    Bradycardia  Med induced by toprol it appears  -hold BB now as risk of HR 40 higher than benefit in setting of ACS/presume CAD and rebounding to 60s without BB  -med  [FreeTextEntry1] : 5 induced      Diet:  Low Na post procedure     DVT PPx:  Hep drip to stop pre-cath.      Disposition:  Pending cath results, likely tomorrow if stents placed with rollator,     Cards follow up on dc

## 2023-03-13 ENCOUNTER — OFFICE VISIT (OUTPATIENT)
Dept: PRIMARY CARE CLINIC | Facility: CLINIC | Age: 83
End: 2023-03-13
Payer: MEDICARE

## 2023-03-13 VITALS
WEIGHT: 129.44 LBS | BODY MASS INDEX: 20.8 KG/M2 | HEIGHT: 66 IN | SYSTOLIC BLOOD PRESSURE: 124 MMHG | OXYGEN SATURATION: 98 % | HEART RATE: 51 BPM | DIASTOLIC BLOOD PRESSURE: 62 MMHG

## 2023-03-13 DIAGNOSIS — F33.0 MILD RECURRENT MAJOR DEPRESSION: ICD-10-CM

## 2023-03-13 DIAGNOSIS — I10 ESSENTIAL HYPERTENSION: ICD-10-CM

## 2023-03-13 DIAGNOSIS — E78.2 HYPERLIPIDEMIA, MIXED: ICD-10-CM

## 2023-03-13 DIAGNOSIS — G47.33 OBSTRUCTIVE SLEEP APNEA: ICD-10-CM

## 2023-03-13 DIAGNOSIS — E11.9 TYPE 2 DIABETES MELLITUS WITHOUT COMPLICATION, WITHOUT LONG-TERM CURRENT USE OF INSULIN: Primary | ICD-10-CM

## 2023-03-13 DIAGNOSIS — Z78.0 POSTMENOPAUSAL: ICD-10-CM

## 2023-03-13 DIAGNOSIS — Z85.3 HISTORY OF BREAST CANCER: ICD-10-CM

## 2023-03-13 PROCEDURE — 99999 PR PBB SHADOW E&M-EST. PATIENT-LVL IV: ICD-10-PCS | Mod: PBBFAC,,, | Performed by: INTERNAL MEDICINE

## 2023-03-13 PROCEDURE — 99214 PR OFFICE/OUTPT VISIT, EST, LEVL IV, 30-39 MIN: ICD-10-PCS | Mod: S$PBB,,, | Performed by: INTERNAL MEDICINE

## 2023-03-13 PROCEDURE — 99214 OFFICE O/P EST MOD 30 MIN: CPT | Mod: S$PBB,,, | Performed by: INTERNAL MEDICINE

## 2023-03-13 PROCEDURE — 99214 OFFICE O/P EST MOD 30 MIN: CPT | Mod: PBBFAC | Performed by: INTERNAL MEDICINE

## 2023-03-13 PROCEDURE — 99999 PR PBB SHADOW E&M-EST. PATIENT-LVL IV: CPT | Mod: PBBFAC,,, | Performed by: INTERNAL MEDICINE

## 2023-03-13 RX ORDER — METFORMIN HYDROCHLORIDE 500 MG/1
500 TABLET ORAL
COMMUNITY
End: 2023-03-13

## 2023-03-13 RX ORDER — LOSARTAN POTASSIUM 50 MG/1
50 TABLET ORAL DAILY
COMMUNITY
End: 2023-05-08 | Stop reason: SDUPTHER

## 2023-03-13 RX ORDER — SIMVASTATIN 20 MG/1
20 TABLET, FILM COATED ORAL NIGHTLY
COMMUNITY

## 2023-03-13 RX ORDER — CALCIUM CARB, CITRATE, MALATE 250 MG
CAPSULE ORAL
COMMUNITY

## 2023-03-13 NOTE — PROGRESS NOTES
[facility] Note    Chief Complaint      Chief Complaint   Patient presents with    Establish Care       History of Present Illness      Ayaka Ellison is a 82 y.o. female with chronic conditions of DM2, HTN, HLD, hx of breast cancer, JOVANNA, depression who presents today for: establish care and review chronic conditions.  Previously seen by Dr. Aguirre, [place].  Last seen 4-5 months ago.   DM2?: Previously on metformin, discontinued in  when A1C 4.9.  Labs requesting.  Eye exam UTD with Dr. Warren.  HTN: BP at goal on losartan  HLD: Controlled on simvastatin.  Hx of breast cancer: Sees Dr. Frank, oncology.  Dx .  ER+/OR+/HER2-.  S/p lumpectomy and tamoxifen 1 yr and femara 5 yrs. Mamm[facility].  JOVANNA: Noncompliant with CPAP.  Flu shot UTD. TdAP .  Zostavax .  Prevnar 13 .  Pneumovax 2016.  COVID vaccine UTD, discussed booster.  Mammograms as above.  PAP smears completed.  DEXA due, will order to [facility].  Cscopes completed.    Past Medical History:  Past Medical History:   Diagnosis Date    Arthritis     Cataract     Glaucoma     Hyperlipidemia     Hypertension     Sleep apnea        Past Surgical History:   has a past surgical history that includes Cholecystectomy; Appendectomy; Breast surgery; Hand surgery (Right); and ANGIOGRAM, CORONARY, WITH LEFT HEART CATHETERIZATION (Left, 2020).    Family History:  family history is not on file.     Social History:  Social History     Tobacco Use    Smoking status: Former     Types: Vaping w/o nicotine     Quit date: 2011     Years since quittin.4    Smokeless tobacco: Never   Substance Use Topics    Alcohol use: Yes    Drug use: No       I personally reviewed all past medical, surgical, social and family history.    Review of Systems   Constitutional:  Negative for chills, fever and malaise/fatigue.   Respiratory:  Negative for shortness of breath.    Cardiovascular:  Negative for chest pain.   Gastrointestinal:   Negative for constipation, diarrhea, nausea and vomiting.   Skin:  Negative for rash.   Neurological:  Negative for weakness.   All other systems reviewed and are negative.     Medications:  Outpatient Encounter Medications as of 3/13/2023   Medication Sig Dispense Refill    aspirin (ECOTRIN) 81 MG EC tablet Take 81 mg by mouth once daily.      calcium-vitamin D3 500 mg(1,250mg) -200 unit per tablet Take 1 tablet by mouth 2 (two) times daily with meals.      cyanocobalamin (VITAMIN B-12) 1000 MCG tablet Take 100 mcg by mouth once daily.      losartan (COZAAR) 50 MG tablet Take 50 mg by mouth once daily.      simvastatin (ZOCOR) 20 MG tablet Take 20 mg by mouth every evening.      ibuprofen (ADVIL,MOTRIN) 600 MG tablet Take 600 mg by mouth every 6 (six) hours as needed for Pain.      Lactobac no.41-Bifidobact no.7 (PROBIOTIC-10) 70 mg (3 billion cell) Cap Take by mouth.      latanoprost 0.005 % ophthalmic solution 1 drop every evening.      multivitamin-Ca-iron-minerals 18-0.4 mg Tab Take 1 tablet by mouth.      omega 3,6,9 combination no.7 92 mg (43 mg-22 [id number]) Chew Take by mouth.      potassium citrate 99 mg Cap Take by mouth.      [DISCONTINUED] acetaminophen (TYLENOL) 500 MG tablet Take 1 tablet (500 mg total) by mouth every 8 (eight) hours as needed. (Patient not taking: Reported on 3/13/2023)  0    [DISCONTINUED] albuterol (PROVENTIL HFA) 90 mcg/actuation inhaler Inhale 2 puffs into the lungs every 6 (six) hours as needed for Wheezing. Rescue (Patient not taking: Reported on 9/21/2021) 6.7 g 0    [DISCONTINUED] atorvastatin (LIPITOR) 40 MG tablet Take 1 tablet (40 mg total) by mouth every evening. 90 tablet 3    [DISCONTINUED] calcium carbonate (OS-MUNIRA) 600 mg calcium (1,500 mg) Tab Take 600 mg by mouth once.      [DISCONTINUED] folic acid (FOLVITE) 400 MCG tablet Take 400 mcg by mouth once daily.      [DISCONTINUED] ginkgo biloba 40 mg Tab Take by mouth.      [DISCONTINUED] icosapent ethyl (VASCEPA) 1  "gram Cap Take 1 tablet by mouth 2 (two) times daily.      [DISCONTINUED] losartan (COZAAR) 100 MG tablet Take 1 tablet (100 mg total) by mouth once daily. 90 tablet 3    [DISCONTINUED] metFORMIN (GLUCOPHAGE) 500 MG tablet Take 500 mg by mouth daily with breakfast.      [DISCONTINUED] sertraline (ZOLOFT) 100 MG tablet Take 100 mg by mouth once daily.       No facility-administered encounter medications on file as of 3/13/2023.       Allergies:  Review of patient's allergies indicates:   Allergen Reactions    Penicillins Itching    Codeine Nausea Only       Health Maintenance:  Immunization History   Administered Date(s) Administered    COVID-19, MRNA, LN-S, PF (Pfizer) (Purple Cap) 01/10/2021, 01/31/2021    Influenza (FLUAD) - Trivalent - Adjuvanted - PF (65+) 09/09/2017    Influenza - High Dose - PF (65 years and older) 09/26/2019    Influenza - Quadrivalent - PF *Preferred* (6 months and older) 10/29/2011, 01/09/2013, 10/04/2015, 09/16/2016    Influenza A (H1N1) 2009 Monovalent - IM 01/14/2010    Pneumococcal Conjugate - 13 Valent 10/04/2015    Pneumococcal Polysaccharide - 23 Valent 09/16/2016    Tdap 09/13/2021    Zoster 10/04/2015      Health Maintenance   Topic Date Due    DEXA Scan  Never done    Lipid Panel  10/30/2025    TETANUS VACCINE  09/13/2031        Physical Exam      Vital Signs  Pulse: (!) 51  SpO2: 98 %  BP: 124/62  BP Location: Left arm  Patient Position: Sitting  Pain Score: 0-No pain  Height and Weight  Height: 5' 6" (167.6 cm)  Weight: 58.7 kg (129 lb 6.6 oz)  BSA (Calculated - sq m): 1.65 sq meters  BMI (Calculated): 20.9  Weight in (lb) to have BMI = 25: 154.6]    Physical Exam  Vitals reviewed.   Constitutional:       Appearance: She is well-developed.   HENT:      Head: Normocephalic and atraumatic.      Right Ear: External ear normal.      Left Ear: External ear normal.   Cardiovascular:      Rate and Rhythm: Normal rate and regular rhythm.      Heart sounds: Normal heart sounds. No murmur " heard.  Pulmonary:      Effort: Pulmonary effort is normal.      Breath sounds: Normal breath sounds. No wheezing or rales.   Abdominal:      General: Bowel sounds are normal. There is no distension.      Palpations: Abdomen is soft.      Tenderness: There is no abdominal tenderness.        Laboratory:  CBC:  Recent Labs   Lab 11/01/20 0628 11/02/20 0422 11/03/20  0448   WBC 5.29 6.19 5.66   RBC 3.10 L 3.29 L 3.05 L   Hemoglobin 10.1 L 10.7 L 10.1 L   Hematocrit 31.4 L 33.1 L 30.4 L   Platelets 226 265 248    H 101 H 100 H   MCH 32.6 H 32.5 H 33.1 H   MCHC 32.2 32.3 33.2     CMP:  Recent Labs   Lab 11/01/20 0628 11/02/20 0422 11/03/20  0448   Glucose 90 97 84   Calcium 7.6 L 8.3 L 8.3 L   Albumin 2.5 L 2.8 L 2.4 L   Total Protein 4.8 L 5.3 L 4.8 L   Sodium 135 L 138 137   Potassium 3.4 L 4.4 3.8   CO2 24 25 25   Chloride 100 102 104   BUN 6 L 5 L 4 L   Alkaline Phosphatase 57 58 56   ALT 10 12 13   AST 25 26 28   Total Bilirubin 0.3 0.3 0.4     URINALYSIS:  Recent Labs   Lab 10/30/20  1511 06/23/21  1603   Color, UA Yellow  --    Specific [place], UA 1.005  --    pH, UA 5.0 9.0 A   Protein, UA Negative  --    Bacteria Occasional  --    Nitrite, UA Negative  --    Leukocytes, UA 2+ A  --    Hyaline Casts, UA 12 A  --       LIPIDS:  Recent Labs   Lab 10/30/20  1355 10/30/20  1847   TSH 1.569  --    HDL  --  53   Cholesterol  --  140   Triglycerides  --  133   LDL Cholesterol  --  60.4 L   HDL/Cholesterol Ratio  --  37.9   Non-HDL Cholesterol  --  87   Total Cholesterol/HDL Ratio  --  2.6     TSH:  Recent Labs   Lab 10/30/20  1355   TSH 1.569     A1C:  Recent Labs   Lab 10/30/20  2027   Hemoglobin A1C 4.9       Assessment/Plan     Ayaka Ellison is a 82 y.o.female with:    1. Type 2 diabetes mellitus without complication, without long-term current use of insulin  Continue current meds.  Request labs.  Eye exam requesting.   2. Essential hypertension  Continue current meds.    3. Hyperlipidemia,  mixed  Continue current meds.    4. History of breast cancer  F/u with Dr. Frank.    6. Obstructive sleep apnea  Recommend CPAP.  7. Postmenopausal  - DXA Bone Density Axial Skeleton 1 or more sites; Future  - DXA Bone Density Axial Skeleton 1 or more sites  Update DEXA.      Chronic conditions status updated as per HPI.  Other than changes above, cont current medications and maintain follow up with specialists.  Follow up in about 6 months (around 9/13/2023) for Follow up visit.    No future appointments.    Ruiz Morton MD  [facility] [place]

## 2023-05-08 RX ORDER — LOSARTAN POTASSIUM 50 MG/1
50 TABLET ORAL DAILY
Qty: 90 TABLET | Refills: 3 | Status: SHIPPED | OUTPATIENT
Start: 2023-05-08 | End: 2023-10-20 | Stop reason: SDUPTHER

## 2023-05-08 NOTE — TELEPHONE ENCOUNTER
----- Message from Ann Marie Linn sent at 5/8/2023  2:31 PM CDT -----  Contact: [phone]  Requesting an RX refill or new RX.  Is this a refill or new RX: refill  RX name and strength (copy/paste from chart):  losartan (COZAAR) 50 MG tablet  Is this a 30 day or 90 day RX: 90 day   Pharmacy name and phone # (copy/paste from chart):    [id number] - [address] & [address] [address]  Phone: [phone] Fax: [phone]  The doctors have asked that we provide their patients with the following 2 reminders -- prescription refills can take up to 72 hours, and a friendly reminder that in the future you can use your [facility] account to request refills: aware

## 2023-10-20 ENCOUNTER — PATIENT MESSAGE (OUTPATIENT)
Dept: PRIMARY CARE CLINIC | Facility: CLINIC | Age: 83
End: 2023-10-20
Payer: MEDICARE

## 2023-10-20 RX ORDER — LOSARTAN POTASSIUM 50 MG/1
50 TABLET ORAL DAILY
Qty: 90 TABLET | Refills: 3 | Status: SHIPPED | OUTPATIENT
Start: 2023-10-20

## 2023-10-20 NOTE — TELEPHONE ENCOUNTER
Care Due:                  Date            Visit Type   Department     Provider  --------------------------------------------------------------------------------                                NP -                              PRIMARY      [facility]  Last Visit: 03-      CARE ([facility])   CARE           Ruiz Lockhart  Next Visit: None Scheduled  None         None Found                                                            Last  Test          Frequency    Reason                     Performed    Due Date  --------------------------------------------------------------------------------    CMP.........  12 months..  losartan.................  Not Found    Overdue    [employer] Embedded Care Due Messages. Reference number: [id number].   10/20/2023 4:03:52 PM CDT

## 2024-02-27 DIAGNOSIS — Z00.00 ENCOUNTER FOR MEDICARE ANNUAL WELLNESS EXAM: ICD-10-CM

## 2024-05-31 ENCOUNTER — OFFICE VISIT (OUTPATIENT)
Dept: PRIMARY CARE CLINIC | Facility: CLINIC | Age: 84
End: 2024-05-31
Payer: MEDICARE

## 2024-05-31 ENCOUNTER — LAB VISIT (OUTPATIENT)
Dept: LAB | Facility: HOSPITAL | Age: 84
End: 2024-05-31
Attending: INTERNAL MEDICINE
Payer: MEDICARE

## 2024-05-31 VITALS
SYSTOLIC BLOOD PRESSURE: 102 MMHG | HEIGHT: 66 IN | OXYGEN SATURATION: 98 % | DIASTOLIC BLOOD PRESSURE: 60 MMHG | WEIGHT: 131.63 LBS | BODY MASS INDEX: 21.16 KG/M2 | HEART RATE: 41 BPM

## 2024-05-31 DIAGNOSIS — E11.22 TYPE 2 DIABETES MELLITUS WITH STAGE 3A CHRONIC KIDNEY DISEASE, WITHOUT LONG-TERM CURRENT USE OF INSULIN: Primary | ICD-10-CM

## 2024-05-31 DIAGNOSIS — I12.9 HYPERTENSIVE KIDNEY DISEASE WITH STAGE 3A CHRONIC KIDNEY DISEASE: ICD-10-CM

## 2024-05-31 DIAGNOSIS — G47.33 OBSTRUCTIVE SLEEP APNEA: ICD-10-CM

## 2024-05-31 DIAGNOSIS — E78.2 HYPERLIPIDEMIA, MIXED: ICD-10-CM

## 2024-05-31 DIAGNOSIS — Z85.3 HISTORY OF BREAST CANCER: ICD-10-CM

## 2024-05-31 DIAGNOSIS — I70.0 ATHEROSCLEROSIS OF AORTA: ICD-10-CM

## 2024-05-31 DIAGNOSIS — N18.31 HYPERTENSIVE KIDNEY DISEASE WITH STAGE 3A CHRONIC KIDNEY DISEASE: ICD-10-CM

## 2024-05-31 DIAGNOSIS — E11.22 TYPE 2 DIABETES MELLITUS WITH STAGE 3A CHRONIC KIDNEY DISEASE, WITHOUT LONG-TERM CURRENT USE OF INSULIN: ICD-10-CM

## 2024-05-31 DIAGNOSIS — N18.31 TYPE 2 DIABETES MELLITUS WITH STAGE 3A CHRONIC KIDNEY DISEASE, WITHOUT LONG-TERM CURRENT USE OF INSULIN: ICD-10-CM

## 2024-05-31 DIAGNOSIS — N18.31 CHRONIC KIDNEY DISEASE, STAGE 3A: ICD-10-CM

## 2024-05-31 DIAGNOSIS — N18.31 TYPE 2 DIABETES MELLITUS WITH STAGE 3A CHRONIC KIDNEY DISEASE, WITHOUT LONG-TERM CURRENT USE OF INSULIN: Primary | ICD-10-CM

## 2024-05-31 DIAGNOSIS — Z78.0 POSTMENOPAUSAL: ICD-10-CM

## 2024-05-31 PROBLEM — N18.30 TYPE 2 DIABETES MELLITUS WITH STAGE 3 CHRONIC KIDNEY DISEASE, WITHOUT LONG-TERM CURRENT USE OF INSULIN, UNSPECIFIED WHETHER STAGE 3A OR 3B CKD: Status: ACTIVE | Noted: 2020-10-31

## 2024-05-31 PROBLEM — I21.4 NSTEMI (NON-ST ELEVATED MYOCARDIAL INFARCTION): Status: RESOLVED | Noted: 2020-10-31 | Resolved: 2024-05-31

## 2024-05-31 PROBLEM — N30.00 ACUTE CYSTITIS: Status: RESOLVED | Noted: 2020-10-31 | Resolved: 2024-05-31

## 2024-05-31 PROBLEM — F33.0 MILD RECURRENT MAJOR DEPRESSION: Status: RESOLVED | Noted: 2023-03-13 | Resolved: 2024-05-31

## 2024-05-31 PROBLEM — I24.9 ACS (ACUTE CORONARY SYNDROME): Status: RESOLVED | Noted: 2020-10-30 | Resolved: 2024-05-31

## 2024-05-31 LAB
ALBUMIN SERPL BCP-MCNC: 3.6 G/DL (ref 3.5–5.2)
ALBUMIN/CREAT UR: 23.4 UG/MG (ref 0–30)
ALP SERPL-CCNC: 62 U/L (ref 55–135)
ALT SERPL W/O P-5'-P-CCNC: 24 U/L (ref 10–44)
ANION GAP SERPL CALC-SCNC: 8 MMOL/L (ref 8–16)
AST SERPL-CCNC: 35 U/L (ref 10–40)
BASOPHILS # BLD AUTO: 0.05 K/UL (ref 0–0.2)
BASOPHILS NFR BLD: 0.9 % (ref 0–1.9)
BILIRUB SERPL-MCNC: 0.5 MG/DL (ref 0.1–1)
BUN SERPL-MCNC: 11 MG/DL (ref 8–23)
CALCIUM SERPL-MCNC: 9.9 MG/DL (ref 8.7–10.5)
CHLORIDE SERPL-SCNC: 106 MMOL/L (ref 95–110)
CHOLEST SERPL-MCNC: 228 MG/DL (ref 120–199)
CHOLEST/HDLC SERPL: 3.6 {RATIO} (ref 2–5)
CO2 SERPL-SCNC: 27 MMOL/L (ref 23–29)
CREAT SERPL-MCNC: 1.1 MG/DL (ref 0.5–1.4)
CREAT UR-MCNC: 158 MG/DL (ref 15–325)
DIFFERENTIAL METHOD BLD: ABNORMAL
EOSINOPHIL # BLD AUTO: 0.3 K/UL (ref 0–0.5)
EOSINOPHIL NFR BLD: 5 % (ref 0–8)
ERYTHROCYTE [DISTWIDTH] IN BLOOD BY AUTOMATED COUNT: 13.4 % (ref 11.5–14.5)
EST. GFR  (NO RACE VARIABLE): 50.5 ML/MIN/1.73 M^2
ESTIMATED AVG GLUCOSE: 100 MG/DL (ref 68–131)
GLUCOSE SERPL-MCNC: 116 MG/DL (ref 70–110)
HBA1C MFR BLD: 5.1 % (ref 4–5.6)
HCT VFR BLD AUTO: 44.9 % (ref 37–48.5)
HDLC SERPL-MCNC: 63 MG/DL (ref 40–75)
HDLC SERPL: 27.6 % (ref 20–50)
HGB BLD-MCNC: 15 G/DL (ref 12–16)
IMM GRANULOCYTES # BLD AUTO: 0.01 K/UL (ref 0–0.04)
IMM GRANULOCYTES NFR BLD AUTO: 0.2 % (ref 0–0.5)
LDLC SERPL CALC-MCNC: 130 MG/DL (ref 63–159)
LYMPHOCYTES # BLD AUTO: 2.3 K/UL (ref 1–4.8)
LYMPHOCYTES NFR BLD: 41.8 % (ref 18–48)
MCH RBC QN AUTO: 35 PG (ref 27–31)
MCHC RBC AUTO-ENTMCNC: 33.4 G/DL (ref 32–36)
MCV RBC AUTO: 105 FL (ref 82–98)
MICROALBUMIN UR DL<=1MG/L-MCNC: 37 UG/ML
MONOCYTES # BLD AUTO: 0.4 K/UL (ref 0.3–1)
MONOCYTES NFR BLD: 7.9 % (ref 4–15)
NEUTROPHILS # BLD AUTO: 2.5 K/UL (ref 1.8–7.7)
NEUTROPHILS NFR BLD: 44.2 % (ref 38–73)
NONHDLC SERPL-MCNC: 165 MG/DL
NRBC BLD-RTO: 0 /100 WBC
PLATELET # BLD AUTO: 253 K/UL (ref 150–450)
PMV BLD AUTO: 10.9 FL (ref 9.2–12.9)
POTASSIUM SERPL-SCNC: 4.3 MMOL/L (ref 3.5–5.1)
PROT SERPL-MCNC: 6.7 G/DL (ref 6–8.4)
RBC # BLD AUTO: 4.28 M/UL (ref 4–5.4)
SODIUM SERPL-SCNC: 141 MMOL/L (ref 136–145)
TRIGL SERPL-MCNC: 175 MG/DL (ref 30–150)
WBC # BLD AUTO: 5.55 K/UL (ref 3.9–12.7)

## 2024-05-31 PROCEDURE — 82043 UR ALBUMIN QUANTITATIVE: CPT | Performed by: INTERNAL MEDICINE

## 2024-05-31 PROCEDURE — 3078F DIAST BP <80 MM HG: CPT | Mod: CPTII,S$GLB,, | Performed by: INTERNAL MEDICINE

## 2024-05-31 PROCEDURE — 99999 PR PBB SHADOW E&M-EST. PATIENT-LVL IV: CPT | Mod: PBBFAC,,, | Performed by: INTERNAL MEDICINE

## 2024-05-31 PROCEDURE — 99214 OFFICE O/P EST MOD 30 MIN: CPT | Mod: S$GLB,,, | Performed by: INTERNAL MEDICINE

## 2024-05-31 PROCEDURE — 80053 COMPREHEN METABOLIC PANEL: CPT | Performed by: INTERNAL MEDICINE

## 2024-05-31 PROCEDURE — 1159F MED LIST DOCD IN RCRD: CPT | Mod: CPTII,S$GLB,, | Performed by: INTERNAL MEDICINE

## 2024-05-31 PROCEDURE — 83036 HEMOGLOBIN GLYCOSYLATED A1C: CPT | Performed by: INTERNAL MEDICINE

## 2024-05-31 PROCEDURE — 85025 COMPLETE CBC W/AUTO DIFF WBC: CPT | Performed by: INTERNAL MEDICINE

## 2024-05-31 PROCEDURE — 3074F SYST BP LT 130 MM HG: CPT | Mod: CPTII,S$GLB,, | Performed by: INTERNAL MEDICINE

## 2024-05-31 PROCEDURE — 80061 LIPID PANEL: CPT | Performed by: INTERNAL MEDICINE

## 2024-05-31 PROCEDURE — 36415 COLL VENOUS BLD VENIPUNCTURE: CPT | Performed by: INTERNAL MEDICINE

## 2024-05-31 PROCEDURE — 1126F AMNT PAIN NOTED NONE PRSNT: CPT | Mod: CPTII,S$GLB,, | Performed by: INTERNAL MEDICINE

## 2024-05-31 RX ORDER — LOSARTAN POTASSIUM 25 MG/1
25 TABLET ORAL DAILY
Qty: 90 TABLET | Refills: 3 | Status: SHIPPED | OUTPATIENT
Start: 2024-05-31

## 2024-05-31 RX ORDER — MAGNESIUM 250 MG
TABLET ORAL
COMMUNITY
Start: 2023-03-13

## 2024-05-31 NOTE — PROGRESS NOTES
[facility] Note    Chief Complaint      Chief Complaint   Patient presents with    Annual Exam       History of Present Illness      Ayaka Ellison is a 83 y.o. female with chronic conditions of DM2, HTN, HLD, aortic atherosclerosis, hx of breast cancer, JOVANNA who presents today for: follow up chronic conditions.  DM2?: Previously on metformin, discontinued in  when A1C 4.9.  Labs due.  Eye exam UTD 2023 with Dr. Warren.  HTN: BP at goal on losartan  HLD: Off simvastatin.  Labs due  Hx of breast cancer: Sees Dr. Frank, oncology.  Dx .  ER+/MT+/HER2-.  S/p lumpectomy and tamoxifen 1 yr and femara 5 yrs. Mammogram surveillance at [facility].  JOVANNA: Noncompliant with CPAP.  Flu shot UTD. TdAP .  Zostavax .  Prevnar 13 .  Pneumovax 2016.  COVID vaccine UTD, discussed booster.  Mammograms as above.  PAP smears completed.  DEXA due, will order to [facility].  Cscopes completed.     Past Medical History:  Past Medical History:   Diagnosis Date    Arthritis     Cataract     Glaucoma     Hyperlipidemia     Hypertension     Sleep apnea        Past Surgical History:   has a past surgical history that includes Cholecystectomy; Appendectomy; Breast surgery; Hand surgery (Right); and ANGIOGRAM, CORONARY, WITH LEFT HEART CATHETERIZATION (Left, 2020).    Family History:  family history is not on file.     Social History:  Social History     Tobacco Use    Smoking status: Former     Types: Vaping w/o nicotine     Quit date: 2011     Years since quittin.7    Smokeless tobacco: Never   Substance Use Topics    Alcohol use: Yes    Drug use: No       I personally reviewed all past medical, surgical, social and family history.    Review of Systems   Constitutional:  Negative for chills, fever and malaise/fatigue.   Respiratory:  Negative for shortness of breath.    Cardiovascular:  Negative for chest pain.   Gastrointestinal:  Negative for constipation, diarrhea, nausea and vomiting.   Skin:   Negative for rash.   Neurological:  Negative for weakness.   All other systems reviewed and are negative.       Medications:  Outpatient Encounter Medications as of 5/31/2024   Medication Sig Dispense Refill    magnesium 250 mg Tab       aspirin (ECOTRIN) 81 MG EC tablet Take 81 mg by mouth once daily.      calcium-vitamin D3 500 mg(1,250mg) -200 unit per tablet Take 1 tablet by mouth 2 (two) times daily with meals. (Patient not taking: Reported on 5/31/2024)      cyanocobalamin (VITAMIN B-12) 1000 MCG tablet Take 100 mcg by mouth once daily. (Patient not taking: Reported on 5/31/2024)      ibuprofen (ADVIL,MOTRIN) 600 MG tablet Take 600 mg by mouth every 6 (six) hours as needed for Pain. (Patient not taking: Reported on 5/31/2024)      Lactobac no.41-Bifidobact no.7 (PROBIOTIC-10) 70 mg (3 billion cell) Cap Take by mouth.      latanoprost 0.005 % ophthalmic solution 1 drop every evening.      losartan (COZAAR) 25 MG tablet Take 1 tablet (25 mg total) by mouth once daily. 90 tablet 3    multivitamin-Ca-iron-minerals 18-0.4 mg Tab Take 1 tablet by mouth.      omega 3,6,9 combination no.7 92 mg (43 mg-22 [id number]) Chew Take by mouth.      potassium citrate 99 mg Cap Take by mouth. (Patient not taking: Reported on 5/31/2024)      simvastatin (ZOCOR) 20 MG tablet Take 20 mg by mouth every evening.      [DISCONTINUED] losartan (COZAAR) 50 MG tablet Take 1 tablet (50 mg total) by mouth once daily. 90 tablet 3     No facility-administered encounter medications on file as of 5/31/2024.       Allergies:  Review of patient's allergies indicates:   Allergen Reactions    Penicillins Itching    Codeine Nausea Only       Health Maintenance:  Immunization History   Administered Date(s) Administered    COVID-19, MRNA, LN-S, PF (Pfizer) (Purple Cap) 01/10/2021, 01/31/2021    Influenza (FLUAD) - Trivalent - Adjuvanted - PF (65+) 09/09/2017    Influenza - High Dose - PF (65 years and older) 09/26/2019    Influenza - Quadrivalent -  "PF *Preferred* (6 months and older) 10/29/2011, 01/09/2013, 10/04/2015, 09/16/2016    Influenza A (H1N1) 2009 Monovalent - IM 01/14/2010    Pneumococcal Conjugate - 13 Valent 10/04/2015    Pneumococcal Polysaccharide - 23 Valent 09/16/2016    Tdap 09/13/2021    Zoster 10/04/2015      Health Maintenance   Topic Date Due    Shingles Vaccine (2 of 3) 11/29/2015    DEXA Scan  08/27/2021    Lipid Panel  04/13/2027    TETANUS VACCINE  09/13/2031        Physical Exam      Vital Signs  Pulse: (!) 41  SpO2: 98 %  BP: 102/60  BP Location: Right arm  Patient Position: Sitting  Pain Score: 0-No pain  Height and Weight  Height: 5' 6" (167.6 cm)  Weight: 59.7 kg (131 lb 9.8 oz)  BSA (Calculated - sq m): 1.67 sq meters  BMI (Calculated): 21.3  Weight in (lb) to have BMI = 25: 154.6]    Physical Exam  Vitals reviewed.   Constitutional:       Appearance: She is well-developed.   HENT:      Head: Normocephalic and atraumatic.      Right Ear: External ear normal.      Left Ear: External ear normal.   Cardiovascular:      Rate and Rhythm: Normal rate and regular rhythm.      Heart sounds: Normal heart sounds. No murmur heard.  Pulmonary:      Effort: Pulmonary effort is normal.      Breath sounds: Normal breath sounds. No wheezing or rales.   Abdominal:      General: Bowel sounds are normal. There is no distension.      Palpations: Abdomen is soft.      Tenderness: There is no abdominal tenderness.          Laboratory:  CBC:      CMP:        Invalid input(s): "CREATININ"  URINALYSIS:  Recent Labs   Lab 06/23/21  1603   pH, UA 9.0 A      LIPIDS:      TSH:      A1C:        Assessment/Plan     Ayaka Ellison is a 83 y.o.female with:    1. Type 2 diabetes mellitus with stage 3a chronic kidney disease, without long-term current use of insulin  - Comprehensive Metabolic Panel; Future  - Hemoglobin A1C; Future  - Microalbumin/Creatinine Ratio, Urine; Future  Continue diet control.  Update labs.  Eye exam UTD  2. Hypertensive kidney " disease with stage 3a chronic kidney disease  - losartan (COZAAR) 25 MG tablet; Take 1 tablet (25 mg total) by mouth once daily.  Dispense: 90 tablet; Refill: 3  Decrease losartan due to hypotensive symptoms and low normal BP readings.  3. Hyperlipidemia, mixed  - Lipid Panel; Future  Ok to stay off simvastatin due to goals of care  4. Chronic kidney disease, stage 3a  - CBC Auto Differential; Future  Update labs  5. Atherosclerosis of aorta  Update labs  6. History of breast cancer  - CBC Auto Differential; Future  F/U with oncology for surveillance mammograms  7. Obstructive sleep apnea  Noncompliant with CPAP./  8. Postmenopausal  - DXA Bone Density Axial Skeleton 1 or more sites; Future  - DXA Bone Density Axial Skeleton 1 or more sites       Chronic conditions status updated as per HPI.  Other than changes above, cont current medications and maintain follow up with specialists.  Follow up in about 6 months (around 11/30/2024) for Follow up visit.    No future appointments.    Ruiz Morton MD  [facility]

## 2024-06-08 NOTE — PROGRESS NOTES
Labs look good except cholesterol significantly higher than last check in 2020.  Recommend focus on low fat diet and exercise to keep controlled and reduce risk of strokes.

## 2024-06-14 ENCOUNTER — HOSPITAL ENCOUNTER (EMERGENCY)
Facility: HOSPITAL | Age: 84
Discharge: HOME | End: 2024-06-15
Attending: STUDENT IN AN ORGANIZED HEALTH CARE EDUCATION/TRAINING PROGRAM
Payer: MEDICARE

## 2024-06-14 VITALS
SYSTOLIC BLOOD PRESSURE: 158 MMHG | HEART RATE: 48 BPM | RESPIRATION RATE: 20 BRPM | BODY MASS INDEX: 20.18 KG/M2 | OXYGEN SATURATION: 99 % | TEMPERATURE: 98 F | DIASTOLIC BLOOD PRESSURE: 69 MMHG | WEIGHT: 125 LBS

## 2024-06-14 DIAGNOSIS — M25.579 ANKLE PAIN: ICD-10-CM

## 2024-06-14 DIAGNOSIS — R00.0 TACHYCARDIA: ICD-10-CM

## 2024-06-14 DIAGNOSIS — M11.20 PSEUDOGOUT: Primary | ICD-10-CM

## 2024-06-14 DIAGNOSIS — M79.673 FOOT PAIN: ICD-10-CM

## 2024-06-14 PROBLEM — M25.572 ACUTE LEFT ANKLE PAIN: Status: ACTIVE | Noted: 2024-06-14

## 2024-06-14 LAB
ALBUMIN SERPL BCP-MCNC: 3.3 G/DL (ref 3.5–5.2)
ALP SERPL-CCNC: 66 U/L (ref 55–135)
ALT SERPL W/O P-5'-P-CCNC: 21 U/L (ref 10–44)
ANION GAP SERPL CALC-SCNC: 11 MMOL/L (ref 8–16)
APPEARANCE FLD: NORMAL
AST SERPL-CCNC: 43 U/L (ref 10–40)
BASOPHILS # BLD AUTO: 0.04 K/UL (ref 0–0.2)
BASOPHILS NFR BLD: 0.3 % (ref 0–1.9)
BILIRUB SERPL-MCNC: 1.3 MG/DL (ref 0.1–1)
BODY FLD TYPE: ABNORMAL
BODY FLD TYPE: NORMAL
BUN SERPL-MCNC: 14 MG/DL (ref 8–23)
CALCIUM SERPL-MCNC: 9.3 MG/DL (ref 8.7–10.5)
CHLORIDE SERPL-SCNC: 106 MMOL/L (ref 95–110)
CO2 SERPL-SCNC: 19 MMOL/L (ref 23–29)
COLOR FLD: NORMAL
CREAT SERPL-MCNC: 1 MG/DL (ref 0.5–1.4)
CRP SERPL-MCNC: 72 MG/L (ref 0–8.2)
CRYSTALS FLD MICRO: POSITIVE
DIFFERENTIAL METHOD BLD: ABNORMAL
EOSINOPHIL # BLD AUTO: 0.1 K/UL (ref 0–0.5)
EOSINOPHIL NFR BLD: 0.5 % (ref 0–8)
ERYTHROCYTE [DISTWIDTH] IN BLOOD BY AUTOMATED COUNT: 12.9 % (ref 11.5–14.5)
ERYTHROCYTE [SEDIMENTATION RATE] IN BLOOD BY PHOTOMETRIC METHOD: 49 MM/HR (ref 0–36)
EST. GFR  (NO RACE VARIABLE): 56.6 ML/MIN/1.73 M^2
GLUCOSE SERPL-MCNC: 134 MG/DL (ref 70–110)
GRAM STN SPEC: NORMAL
HCT VFR BLD AUTO: 37.9 % (ref 37–48.5)
HCV AB SERPL QL IA: NORMAL
HGB BLD-MCNC: 13.2 G/DL (ref 12–16)
HIV 1+2 AB+HIV1 P24 AG SERPL QL IA: NORMAL
IMM GRANULOCYTES # BLD AUTO: 0.04 K/UL (ref 0–0.04)
IMM GRANULOCYTES NFR BLD AUTO: 0.3 % (ref 0–0.5)
LYMPHOCYTES # BLD AUTO: 1 K/UL (ref 1–4.8)
LYMPHOCYTES NFR BLD: 8.5 % (ref 18–48)
MCH RBC QN AUTO: 35 PG (ref 27–31)
MCHC RBC AUTO-ENTMCNC: 34.8 G/DL (ref 32–36)
MCV RBC AUTO: 101 FL (ref 82–98)
MONOCYTES # BLD AUTO: 1 K/UL (ref 0.3–1)
MONOCYTES NFR BLD: 8.6 % (ref 4–15)
MONOS+MACROS NFR FLD MANUAL: 12 %
NEUTROPHILS # BLD AUTO: 9.4 K/UL (ref 1.8–7.7)
NEUTROPHILS NFR BLD: 81.8 % (ref 38–73)
NEUTROPHILS NFR FLD MANUAL: 88 %
NRBC BLD-RTO: 0 /100 WBC
PLATELET # BLD AUTO: 245 K/UL (ref 150–450)
PMV BLD AUTO: 10.3 FL (ref 9.2–12.9)
POTASSIUM SERPL-SCNC: 4.2 MMOL/L (ref 3.5–5.1)
PROT SERPL-MCNC: 6.7 G/DL (ref 6–8.4)
RBC # BLD AUTO: 3.77 M/UL (ref 4–5.4)
SODIUM SERPL-SCNC: 136 MMOL/L (ref 136–145)
WBC # BLD AUTO: 11.53 K/UL (ref 3.9–12.7)
WBC # FLD: NORMAL /CU MM

## 2024-06-14 PROCEDURE — 20605 DRAIN/INJ JOINT/BURSA W/O US: CPT | Mod: LT

## 2024-06-14 PROCEDURE — 25000003 PHARM REV CODE 250

## 2024-06-14 PROCEDURE — 96374 THER/PROPH/DIAG INJ IV PUSH: CPT

## 2024-06-14 PROCEDURE — 87075 CULTR BACTERIA EXCEPT BLOOD: CPT

## 2024-06-14 PROCEDURE — 80053 COMPREHEN METABOLIC PANEL: CPT

## 2024-06-14 PROCEDURE — 89051 BODY FLUID CELL COUNT: CPT

## 2024-06-14 PROCEDURE — 96376 TX/PRO/DX INJ SAME DRUG ADON: CPT

## 2024-06-14 PROCEDURE — 87389 HIV-1 AG W/HIV-1&-2 AB AG IA: CPT | Performed by: PHYSICIAN ASSISTANT

## 2024-06-14 PROCEDURE — 86803 HEPATITIS C AB TEST: CPT | Performed by: PHYSICIAN ASSISTANT

## 2024-06-14 PROCEDURE — 87205 SMEAR GRAM STAIN: CPT | Mod: 59

## 2024-06-14 PROCEDURE — 87070 CULTURE OTHR SPECIMN AEROBIC: CPT

## 2024-06-14 PROCEDURE — 87206 SMEAR FLUORESCENT/ACID STAI: CPT

## 2024-06-14 PROCEDURE — 89060 EXAM SYNOVIAL FLUID CRYSTALS: CPT

## 2024-06-14 PROCEDURE — 96375 TX/PRO/DX INJ NEW DRUG ADDON: CPT

## 2024-06-14 PROCEDURE — 87102 FUNGUS ISOLATION CULTURE: CPT

## 2024-06-14 PROCEDURE — 85652 RBC SED RATE AUTOMATED: CPT

## 2024-06-14 PROCEDURE — 85025 COMPLETE CBC W/AUTO DIFF WBC: CPT

## 2024-06-14 PROCEDURE — 86140 C-REACTIVE PROTEIN: CPT

## 2024-06-14 PROCEDURE — 87116 MYCOBACTERIA CULTURE: CPT

## 2024-06-14 PROCEDURE — 99284 EMERGENCY DEPT VISIT MOD MDM: CPT | Mod: 25

## 2024-06-14 PROCEDURE — 87070 CULTURE OTHR SPECIMN AEROBIC: CPT | Mod: 59

## 2024-06-14 PROCEDURE — 63600175 PHARM REV CODE 636 W HCPCS

## 2024-06-14 RX ORDER — ONDANSETRON HYDROCHLORIDE 2 MG/ML
4 INJECTION, SOLUTION INTRAVENOUS
Status: COMPLETED | OUTPATIENT
Start: 2024-06-14 | End: 2024-06-14

## 2024-06-14 RX ORDER — IBUPROFEN 600 MG/1
600 TABLET ORAL EVERY 6 HOURS PRN
Qty: 20 TABLET | Refills: 0 | Status: SHIPPED | OUTPATIENT
Start: 2024-06-14 | End: 2024-06-19

## 2024-06-14 RX ORDER — MORPHINE SULFATE 4 MG/ML
4 INJECTION, SOLUTION INTRAMUSCULAR; INTRAVENOUS
Status: COMPLETED | OUTPATIENT
Start: 2024-06-14 | End: 2024-06-14

## 2024-06-14 RX ORDER — MORPHINE SULFATE 2 MG/ML
2 INJECTION, SOLUTION INTRAMUSCULAR; INTRAVENOUS
Status: COMPLETED | OUTPATIENT
Start: 2024-06-14 | End: 2024-06-14

## 2024-06-14 RX ORDER — LIDOCAINE HYDROCHLORIDE 10 MG/ML
INJECTION INFILTRATION; PERINEURAL
Status: COMPLETED
Start: 2024-06-14 | End: 2024-06-14

## 2024-06-14 RX ORDER — LIDOCAINE HYDROCHLORIDE 10 MG/ML
10 INJECTION INFILTRATION; PERINEURAL ONCE
Status: COMPLETED | OUTPATIENT
Start: 2024-06-14 | End: 2024-06-14

## 2024-06-14 RX ADMIN — ONDANSETRON 4 MG: 2 INJECTION INTRAMUSCULAR; INTRAVENOUS at 04:06

## 2024-06-14 RX ADMIN — LIDOCAINE HYDROCHLORIDE 10 ML: 10 INJECTION INFILTRATION; PERINEURAL at 07:06

## 2024-06-14 RX ADMIN — MORPHINE SULFATE 2 MG: 2 INJECTION, SOLUTION INTRAMUSCULAR; INTRAVENOUS at 04:06

## 2024-06-14 RX ADMIN — MORPHINE SULFATE 4 MG: 4 INJECTION INTRAVENOUS at 05:06

## 2024-06-14 RX ADMIN — LIDOCAINE HYDROCHLORIDE 10 ML: 10 INJECTION, SOLUTION INFILTRATION; PERINEURAL at 07:06

## 2024-06-14 NOTE — ED PROVIDER NOTES
Encounter Date: 2024       History     Chief Complaint   Patient presents with    Ankle Pain     Left ankle pain, redness and swelling to medial aspect. Denies all falls or trauma, no Hx of gout. Pt had to be assisted and pulled out of car upon arrival.      83-year-old female with hypertension, hyperlipidemia is presenting with left ankle pain that started this morning.  States she took Motrin and a pain pill this morning which did not provide any relief.  She denies any history of gout.  She did report chills yesterday, no reported fever.  Also complaining of nausea without vomiting and some diarrhea yesterday.    The history is provided by the patient. No  was used.     Review of patient's allergies indicates:   Allergen Reactions    Penicillins Itching    Codeine Nausea Only     Past Medical History:   Diagnosis Date    Arthritis     Cataract     Glaucoma     Hyperlipidemia     Hypertension     Sleep apnea      Past Surgical History:   Procedure Laterality Date    ANGIOGRAM, CORONARY, WITH LEFT HEART CATHETERIZATION Left 2020    Procedure: Angiogram, Coronary, with Left Heart Cath;  Surgeon: Wil Rose MD;  Location: [facility] CATH LAB;  Service: Cardiology;  Laterality: Left;    APPENDECTOMY      BREAST SURGERY      right breast lumpectomy    CHOLECYSTECTOMY      HAND SURGERY Right      No family history on file.  Social History     Tobacco Use    Smoking status: Former     Types: Vaping w/o nicotine     Quit date: 2011     Years since quittin.7    Smokeless tobacco: Never   Substance Use Topics    Alcohol use: Yes    Drug use: No     Physical Exam     Initial Vitals [24 1504]   BP Pulse Resp Temp SpO2   (!) 129/59 (!) 58 18 98.3 °F (36.8 °C) 97 %      MAP       --         Physical Exam    Nursing note and vitals reviewed.  Constitutional: She is not diaphoretic. No distress.   HENT:   Head: Normocephalic and atraumatic.   Eyes: Conjunctivae are normal.  "  Cardiovascular:  Normal rate and regular rhythm.           Pulmonary/Chest: No respiratory distress. She has no wheezes. She has no rhonchi. She has no rales.   Abdominal: Abdomen is soft. She exhibits no distension. There is no abdominal tenderness. There is no rebound and no guarding.   Musculoskeletal:         General: Tenderness and edema present.      Comments: Unable to range of motion left ankle due to pain.  Left Medial malleoli erythematous, tender to palpation, edematous     Neurological: She is alert.   Skin: Skin is warm and dry.   Psychiatric: She has a normal mood and affect. Thought content normal.         ED Course   Arthrocentesis    Date/Time: 2024 9:09 PM  Location procedure was performed: [facility] EMERGENCY DEPARTMENT    Performed by: Sarah Russell MD  Authorized by: Mariano Tripathi MD  Consent Done: Yes  Consent: Written consent obtained.  Risks and benefits: risks, benefits and alternatives were discussed  Required items: required blood products, implants, devices, and special equipment available  Patient identity confirmed: MRN, verbally with patient, name and   Time out: Immediately prior to procedure a "time out" was called to verify the correct patient, procedure, equipment, support staff and site/side marked as required.  Indications: joint swelling, pain, possible septic joint and diagnostic evaluation   Body area: ankle  Local anesthesia used: no    Anesthesia:  Local anesthesia used: no    Patient sedated: no  Preparation: Patient was prepped and draped in the usual sterile fashion.  Needle size: 20 G  Approach: medial  Aspirate amount: 5 mL  Aspirate: blood-tinged  Patient tolerance: Patient tolerated the procedure well with no immediate complications  Complications: No        Labs Reviewed   CBC W/ AUTO DIFFERENTIAL - Abnormal; Notable for the following components:       Result Value    RBC 3.77 (*)      (*)     MCH 35.0 (*)     Gran # (ANC) 9.4 (*)     Gran % 81.8 " (*)     Lymph % 8.5 (*)     All other components within normal limits   COMPREHENSIVE METABOLIC PANEL - Abnormal; Notable for the following components:    CO2 19 (*)     Glucose 134 (*)     Albumin 3.3 (*)     Total Bilirubin 1.3 (*)     AST 43 (*)     eGFR 56.6 (*)     All other components within normal limits   SEDIMENTATION RATE - Abnormal; Notable for the following components:    Sed Rate 49 (*)     All other components within normal limits   C-REACTIVE PROTEIN - Abnormal; Notable for the following components:    CRP 72.0 (*)     All other components within normal limits   GRAM STAIN    Narrative:     LEFT ANKLE JOINT FLUID   GRAM STAIN   CULTURE, BODY FLUID - BACTEC   CULTURE, AEROBIC  (SPECIFY SOURCE)   CULTURE, ANAEROBIC   CULTURE, FUNGUS   AFB CULTURE & SMEAR   HIV 1 / 2 ANTIBODY    Narrative:     Release to patient->Immediate   HEPATITIS C ANTIBODY    Narrative:     Release to patient->Immediate   WBC & DIFF, BODY FLUID   BODY FLUID CRYSTAL          Imaging Results              X-Ray Foot Complete Left (Final result)  Result time 06/14/24 17:31:07      Final result by Juan Carlos Gunn MD (06/14/24 17:31:07)                   Impression:      No acute displaced fracture-dislocation identified      Electronically signed by: Juan Carlos Gunn MD  Date:    06/14/2024  Time:    17:31               Narrative:    EXAMINATION:  XR ANKLE COMPLETE 3 VIEW LEFT; XR FOOT COMPLETE 3 VIEW LEFT    CLINICAL HISTORY:  Pain in unspecified ankle and joints of unspecified foot; Pain in unspecified foot    TECHNIQUE:  AP, lateral and oblique views of the left ankle and left foot    COMPARISON:  None    FINDINGS:  Prominence of the soft tissues about the ankle.  There is degenerative spurring about the medial malleolus and also base of the 5th metatarsal.  Ankle mortise is otherwise well aligned and intact.  Lisfranc articulation is congruent.  Mild hallux valgus configuration.  No evidence for acute displaced fracture, dislocation or  destructive osseous process.  Small plantar calcaneal spur.  Small enthesophyte Achilles insertion site.  Age-related degenerative change most prominent at the dorsal foot and 1st MTP joint, right all minimal to mild.  No subcutaneous emphysema or radiopaque foreign body.                                       X-Ray Ankle Complete Left (Final result)  Result time 06/14/24 17:31:07      Final result by Juan Carlos Gunn MD (06/14/24 17:31:07)                   Impression:      No acute displaced fracture-dislocation identified      Electronically signed by: Juan Carlos Gunn MD  Date:    06/14/2024  Time:    17:31               Narrative:    EXAMINATION:  XR ANKLE COMPLETE 3 VIEW LEFT; XR FOOT COMPLETE 3 VIEW LEFT    CLINICAL HISTORY:  Pain in unspecified ankle and joints of unspecified foot; Pain in unspecified foot    TECHNIQUE:  AP, lateral and oblique views of the left ankle and left foot    COMPARISON:  None    FINDINGS:  Prominence of the soft tissues about the ankle.  There is degenerative spurring about the medial malleolus and also base of the 5th metatarsal.  Ankle mortise is otherwise well aligned and intact.  Lisfranc articulation is congruent.  Mild hallux valgus configuration.  No evidence for acute displaced fracture, dislocation or destructive osseous process.  Small plantar calcaneal spur.  Small enthesophyte Achilles insertion site.  Age-related degenerative change most prominent at the dorsal foot and 1st MTP joint, right all minimal to mild.  No subcutaneous emphysema or radiopaque foreign body.                                       Medications   morphine injection 2 mg (2 mg Intravenous Given 6/14/24 1618)   ondansetron injection 4 mg (4 mg Intravenous Given 6/14/24 1618)   morphine injection 4 mg (4 mg Intravenous Given 6/14/24 1747)   LIDOcaine HCL 10 mg/ml (1%) injection 10 mL (10 mLs Other Given by Provider 6/14/24 1945)     Medical Decision Making  83-year-old female with hypertension,  hyperlipidemia is presenting with atraumatic left ankle pain that started this morning.  States she took Motrin and a pain pill this morning which did not provide any relief.  She presents afebrile, hemodynamically stable.  Left ankle erythematous and swollen medially.  Concern for gout versus septic arthritis.  ESR and CRP ordered.  X-ray ordered.  Basic labs ordered given her GI complaints. Abdomen is benign on exam.    Amount and/or Complexity of Data Reviewed  Labs: ordered. Decision-making details documented in ED Course.  Radiology: ordered.    Risk  Prescription drug management.               ED Course as of 06/14/24 2209 Fri Jun 14, 2024 1809 Sed Rate(!): 49 [KL]   1809 CRP(!): 72.0  Inflammatory markers elevated. [KL]   2109 Arthrocentesis performed.  Awaiting labs from joint fluid. [KL]   2207 Pt signed out to oncoming team pending arthrocentesis labs. To be followed by oncoming team. Final disposition per remaining workup and patient re-evaluation.    [KL]      ED Course User Index  [KL] Sarah Russell MD                           Clinical Impression:  Final diagnoses:  [M25.579] Ankle pain  [M79.673] Foot pain  [R00.0] Tachycardia                 Sarah Russell MD  Resident  06/14/24 2209

## 2024-06-14 NOTE — ED NOTES
Patient comes into the emergency department by POV with complaints of left ankle pain. Patient reports 10/10 ankle anterior, left ankle pain, denies fall/injury. Pt also reports nausea and diarrhea for 2 days. Patient denies SOB/CP.    LOC: The patient is awake, alert and aware of environment with an appropriate affect, the patient is oriented x 3 and speaking appropriately.   APPEARANCE: Patient appears comfortable and in no acute distress, patient is clean and well groomed.  SKIN: The skin is warm and dry, color consistent with ethnicity, patient has normal skin turgor and moist mucus membranes, skin intact, no breakdown or bruising noted.   MUSCULOSKELETAL: Patient moving all extremities spontaneously. Some swelling noted in left ankle, pt reports 10/10 pain, cannot bear weight on ankle.  RESPIRATORY: Airway is open and patent, respirations are spontaneous, patient has a normal effort and rate, no accessory muscle.  CARDIAC: Pt placed on cardiac monitor. Patient has a normal rate and regular rhythm, no edema noted, capillary refill < 3 seconds.   GASTRO: Soft and non tender to palpation, no distention noted. Pt reports nausea, diarrhea x2 days.  : Pt denies any pain or frequency with urination.  NEURO: Pt opens eyes spontaneously, behavior appropriate to situation, follows commands, facial expression symmetrical, bilateral hand grasp equal and even, purposeful motor response noted, normal sensation in all extremities when touched with a finger.

## 2024-06-15 LAB — PATH INTERP FLD-IMP: NORMAL

## 2024-06-15 NOTE — SUBJECTIVE & OBJECTIVE
Past Medical History:   Diagnosis Date    Arthritis     Cataract     Glaucoma     Hyperlipidemia     Hypertension     Sleep apnea        Past Surgical History:   Procedure Laterality Date    ANGIOGRAM, CORONARY, WITH LEFT HEART CATHETERIZATION Left 11/2/2020    Procedure: Angiogram, Coronary, with Left Heart Cath;  Surgeon: Wil Rose MD;  Location: [facility] CATH LAB;  Service: Cardiology;  Laterality: Left;    APPENDECTOMY      BREAST SURGERY      right breast lumpectomy    CHOLECYSTECTOMY      HAND SURGERY Right        Review of patient's allergies indicates:   Allergen Reactions    Penicillins Itching    Codeine Nausea Only       No current facility-administered medications for this encounter.     Current Outpatient Medications   Medication Sig    aspirin (ECOTRIN) 81 MG EC tablet Take 81 mg by mouth once daily.    calcium-vitamin D3 500 mg(1,250mg) -200 unit per tablet Take 1 tablet by mouth 2 (two) times daily with meals. (Patient not taking: Reported on 5/31/2024)    cyanocobalamin (VITAMIN B-12) 1000 MCG tablet Take 100 mcg by mouth once daily. (Patient not taking: Reported on 5/31/2024)    ibuprofen (ADVIL,MOTRIN) 600 MG tablet Take 600 mg by mouth every 6 (six) hours as needed for Pain. (Patient not taking: Reported on 5/31/2024)    Lactobac no.41-Bifidobact no.7 (PROBIOTIC-10) 70 mg (3 billion cell) Cap Take by mouth.    latanoprost 0.005 % ophthalmic solution 1 drop every evening.    losartan (COZAAR) 25 MG tablet Take 1 tablet (25 mg total) by mouth once daily.    magnesium 250 mg Tab     multivitamin-Ca-iron-minerals 18-0.4 mg Tab Take 1 tablet by mouth.    omega 3,6,9 combination no.7 92 mg (43 mg-22 [id number]) Chew Take by mouth.    potassium citrate 99 mg Cap Take by mouth. (Patient not taking: Reported on 5/31/2024)    simvastatin (ZOCOR) 20 MG tablet Take 20 mg by mouth every evening.     Family History    None       Tobacco Use    Smoking status: Former     Types: Vaping w/o nicotine     Quit  date: 2011     Years since quittin.7    Smokeless tobacco: Never   Substance and Sexual Activity    Alcohol use: Yes    Drug use: No    Sexual activity: Not on file     ROS  Constitutional: positive for fevers  Eyes: negative visual changes  ENT: negative for hearing loss  Respiratory: negative for dyspnea  Cardiovascular: negative for chest pain  Gastrointestinal: negative for abdominal pain, positive for diarrhea  Genitourinary: negative for dysuria  Neurological: negative for headaches  Behavioral/Psych: negative for hallucinations  Endocrine: negative for temperature intolerance    Objective:     Vital Signs (Most Recent):  Temp: 97.5 °F (36.4 °C) (24)  Pulse: (!) 53 (24)  Resp: 16 (24)  BP: (!) 146/65 (24)  SpO2: 100 % (24) Vital Signs (24h Range):  Temp:  [97.5 °F (36.4 °C)-98.3 °F (36.8 °C)] 97.5 °F (36.4 °C)  Pulse:  [53-58] 53  Resp:  [16-18] 16  SpO2:  [97 %-100 %] 100 %  BP: (129-162)/(59-72) 146/65     Weight: 56.7 kg (125 lb)     Body mass index is 20.18 kg/m².    No intake or output data in the 24 hours ending 24     Ortho/SPM Exam  General:  no acute distress, appears stated age   Neuro: alert and oriented x3  Psych: normal mood  Head: normocephalic, atraumatic.  Eyes: no scleral icterus  Mouth: moist mucous membranes  Cardiovascular: extremities warm and well perfused  Lungs: breathing comfortably, equal chest rise bilat  Skin: clean, dry, intact (any exceptions noted in below musculoskeletal exam)    MSK:  RUE:  - Skin intact throughout, no open wounds  - No swelling  - No ecchymosis, erythema, or signs of cellulitis  - NonTTP throughout  - AROM and PROM of the shoulder, elbow, wrist, and hand intact without pain  - Axillary/AIN/PIN/Radial/Median/Ulnar Nerves assessed in isolation without deficit  - SILT throughout  - Compartments soft  - Radial artery palpated   - Capillary Refill <3s    LUE:  - Skin intact throughout, no  open wounds  - No swelling  - No ecchymosis, erythema, or signs of cellulitis  - NonTTP throughout  - AROM and PROM of the shoulder, elbow, wrist, and hand intact without pain  - Axillary/AIN/PIN/Radial/Median/Ulnar Nerves assessed in isolation without deficit  - SILT throughout  - Compartments soft  - Radial artery palpated   - Capillary Refill <3s    RLE:  - Skin intact throughout, no open wounds  - No swelling  - No ecchymosis, erythema, or signs of cellulitis  - NonTTP throughout  - AROM and PROM of the hip, knee, ankle, and foot intact without pain  - TA/EHL/Gastroc/FHL assessed in isolation without deficit  - SILT throughout  - Compartments soft  - DP palpated  - Capillary Refill <3s      LLE:  - Skin intact throughout, no open wounds  - Ankle effusion present  - No ecchymosis, erythema, or signs of cellulitis  - TTP through anterior ankle joint line as well as anteromedial gutter  - AROM and PROM of the hip, knee, and foot intact without pain  - Pain with AROM and PROM of ankle  - TA/EHL/Gastroc/FHL assessed in isolation without deficit  - SILT throughout  - Compartments soft  - DP palpated  - Capillary Refill <3s        Spine/pelvis/axial body:  No pain with compression of pelvis  No chest wall or abdominal tenderness         Significant Labs: A1C:   Recent Labs   Lab 05/31/24  1051   HGBA1C 5.1     CBC:   Recent Labs   Lab 06/14/24  1625   WBC 11.53   HGB 13.2   HCT 37.9        CMP:   Recent Labs   Lab 06/14/24  1625      K 4.2      CO2 19*   *   BUN 14   CREATININE 1.0   CALCIUM 9.3   PROT 6.7   ALBUMIN 3.3*   BILITOT 1.3*   ALKPHOS 66   AST 43*   ALT 21   ANIONGAP 11       CRP:   Recent Labs   Lab 06/14/24  1625   CRP 72.0*     ESR: 49  All pertinent labs within the past 24 hours have been reviewed.    Significant Imaging: I have reviewed and interpreted all pertinent imaging results/findings.  XR L ankle: No acute fracture or dislocation. Mild soft tissue swelling.  Chondrocalcinosis just distal to medial malleolus.

## 2024-06-15 NOTE — DISCHARGE INSTRUCTIONS
Diagnosis: Pseudogout    Tests today showed:   Labs Reviewed   CBC W/ AUTO DIFFERENTIAL - Abnormal; Notable for the following components:       Result Value    RBC 3.77 (*)      (*)     MCH 35.0 (*)     Gran # (ANC) 9.4 (*)     Gran % 81.8 (*)     Lymph % 8.5 (*)     All other components within normal limits   COMPREHENSIVE METABOLIC PANEL - Abnormal; Notable for the following components:    CO2 19 (*)     Glucose 134 (*)     Albumin 3.3 (*)     Total Bilirubin 1.3 (*)     AST 43 (*)     eGFR 56.6 (*)     All other components within normal limits   SEDIMENTATION RATE - Abnormal; Notable for the following components:    Sed Rate 49 (*)     All other components within normal limits   C-REACTIVE PROTEIN - Abnormal; Notable for the following components:    CRP 72.0 (*)     All other components within normal limits   BODY FLUID CRYSTAL - Abnormal; Notable for the following components:    Body Fluid Crystal Positive (*)     All other components within normal limits    Narrative:     LEFT ANKLE JOINT FLUID   GRAM STAIN    Narrative:     LEFT ANKLE JOINT FLUID   GRAM STAIN   GRAM STAIN   CULTURE, BODY FLUID - BACTEC   CULTURE, AEROBIC  (SPECIFY SOURCE)   CULTURE, ANAEROBIC   CULTURE, FUNGUS   AFB CULTURE & SMEAR   HIV 1 / 2 ANTIBODY    Narrative:     Release to patient->Immediate   HEPATITIS C ANTIBODY    Narrative:     Release to patient->Immediate   WBC & DIFF, BODY FLUID    Narrative:     LEFT ANKLE JOINT FLUID     X-Ray Foot Complete Left   Final Result      No acute displaced fracture-dislocation identified         Electronically signed by: Juan Carlos Gunn MD   Date:    06/14/2024   Time:    17:31      X-Ray Ankle Complete Left   Final Result      No acute displaced fracture-dislocation identified         Electronically signed by: Juan Carlos Gunn MD   Date:    06/14/2024   Time:    17:31      X-Ray Chest AP Portable    (Results Pending)       Treatments you had today:   Medications   morphine injection 2 mg (2 mg  Intravenous Given 6/14/24 1618)   ondansetron injection 4 mg (4 mg Intravenous Given 6/14/24 1618)   morphine injection 4 mg (4 mg Intravenous Given 6/14/24 8397)   LIDOcaine HCL 10 mg/ml (1%) injection 10 mL (10 mLs Other Given by Provider 6/14/24 1945)       Follow-Up Plan:  - Follow-up with primary care doctor within 3 - 5 days  - Additional testing and/or evaluation as directed by your primary doctor    Return to the Emergency Department for symptoms including but not limited to: worsening symptoms, shortness of breath or chest pain, vomiting with inability to hold down fluids, fevers greater than 100.4°F, passing out/fainting/unconsciousness, or other concerning symptoms.

## 2024-06-15 NOTE — CONSULTS
[facility] - Emergency Dept  Orthopedics  Consult Note    Patient Name: Ayaka Ellison  MRN: [id number]  Admission Date: 6/14/2024  Hospital Length of Stay: 0 days  Attending Provider: No att. providers found  Primary Care Provider: Ruiz Morton MD        Inpatient consult to Orthopedic Surgery  Consult performed by: PRINCE Arredondo MD  Consult ordered by: Sarah Russell MD        Subjective:     Principal Problem:Acute left ankle pain    Chief Complaint:   Chief Complaint   Patient presents with    Ankle Pain     Left ankle pain, redness and swelling to medial aspect. Denies all falls or trauma, no Hx of gout. Pt had to be assisted and pulled out of car upon arrival.         HPI: Ayaka Ellison is a 83 y.o. female with PMH significant for DM, HTN, CKD and right sided breast cancer in 2003 s/p lumpectomy and radiation presenting with left ankle pain. Patient woke up this morning with left ankle pain and inability to bear weight through the LLE. She denies any trauma or inciting event. Denies a history of gout or septic arthritis. Patient did have a dental procedure yesterday. She also had a subjective fever yesterday as well as diarrhea. Patient denies numbness and tingling. Denies any other musculoskeletal pain or injuries. No known history of prior left ankle injury or surgery. Walks w/out assisted devices at baseline. Lives alone. Doesn't take any anticoagulation at baseline. Last ate last night. Last drank water around 5PM.     They deny IV drug use.  They deny tobacco use.   They drink about 2 to 3 drinks per day.   They deny immunosuppressant medications.  They deny chemotherapy.  They have a history of radiation to the right breast in 2003.       Past Medical History:   Diagnosis Date    Arthritis     Cataract     Glaucoma     Hyperlipidemia     Hypertension     Sleep apnea        Past Surgical History:   Procedure Laterality Date    ANGIOGRAM, CORONARY, WITH LEFT HEART CATHETERIZATION Left  2020    Procedure: Angiogram, Coronary, with Left Heart Cath;  Surgeon: Wil Rose MD;  Location: [facility] CATH LAB;  Service: Cardiology;  Laterality: Left;    APPENDECTOMY      BREAST SURGERY      right breast lumpectomy    CHOLECYSTECTOMY      HAND SURGERY Right        Review of patient's allergies indicates:   Allergen Reactions    Penicillins Itching    Codeine Nausea Only       No current facility-administered medications for this encounter.     Current Outpatient Medications   Medication Sig    aspirin (ECOTRIN) 81 MG EC tablet Take 81 mg by mouth once daily.    calcium-vitamin D3 500 mg(1,250mg) -200 unit per tablet Take 1 tablet by mouth 2 (two) times daily with meals. (Patient not taking: Reported on 2024)    cyanocobalamin (VITAMIN B-12) 1000 MCG tablet Take 100 mcg by mouth once daily. (Patient not taking: Reported on 2024)    ibuprofen (ADVIL,MOTRIN) 600 MG tablet Take 600 mg by mouth every 6 (six) hours as needed for Pain. (Patient not taking: Reported on 2024)    Lactobac no.41-Bifidobact no.7 (PROBIOTIC-10) 70 mg (3 billion cell) Cap Take by mouth.    latanoprost 0.005 % ophthalmic solution 1 drop every evening.    losartan (COZAAR) 25 MG tablet Take 1 tablet (25 mg total) by mouth once daily.    magnesium 250 mg Tab     multivitamin-Ca-iron-minerals 18-0.4 mg Tab Take 1 tablet by mouth.    omega 3,6,9 combination no.7 92 mg (43 mg-22 [id number]) Chew Take by mouth.    potassium citrate 99 mg Cap Take by mouth. (Patient not taking: Reported on 2024)    simvastatin (ZOCOR) 20 MG tablet Take 20 mg by mouth every evening.     Family History    None       Tobacco Use    Smoking status: Former     Types: Vaping w/o nicotine     Quit date: 2011     Years since quittin.7    Smokeless tobacco: Never   Substance and Sexual Activity    Alcohol use: Yes    Drug use: No    Sexual activity: Not on file     ROS  Constitutional: positive for fevers  Eyes: negative visual  changes  ENT: negative for hearing loss  Respiratory: negative for dyspnea  Cardiovascular: negative for chest pain  Gastrointestinal: negative for abdominal pain, positive for diarrhea  Genitourinary: negative for dysuria  Neurological: negative for headaches  Behavioral/Psych: negative for hallucinations  Endocrine: negative for temperature intolerance    Objective:     Vital Signs (Most Recent):  Temp: 97.5 °F (36.4 °C) (06/14/24 1900)  Pulse: (!) 53 (06/14/24 1900)  Resp: 16 (06/14/24 1900)  BP: (!) 146/65 (06/14/24 1900)  SpO2: 100 % (06/14/24 1900) Vital Signs (24h Range):  Temp:  [97.5 °F (36.4 °C)-98.3 °F (36.8 °C)] 97.5 °F (36.4 °C)  Pulse:  [53-58] 53  Resp:  [16-18] 16  SpO2:  [97 %-100 %] 100 %  BP: (129-162)/(59-72) 146/65     Weight: 56.7 kg (125 lb)     Body mass index is 20.18 kg/m².    No intake or output data in the 24 hours ending 06/14/24 2103     Ortho/SPM Exam  General:  no acute distress, appears stated age   Neuro: alert and oriented x3  Psych: normal mood  Head: normocephalic, atraumatic.  Eyes: no scleral icterus  Mouth: moist mucous membranes  Cardiovascular: extremities warm and well perfused  Lungs: breathing comfortably, equal chest rise bilat  Skin: clean, dry, intact (any exceptions noted in below musculoskeletal exam)    MSK:  RUE:  - Skin intact throughout, no open wounds  - No swelling  - No ecchymosis, erythema, or signs of cellulitis  - NonTTP throughout  - AROM and PROM of the shoulder, elbow, wrist, and hand intact without pain  - Axillary/AIN/PIN/Radial/Median/Ulnar Nerves assessed in isolation without deficit  - SILT throughout  - Compartments soft  - Radial artery palpated   - Capillary Refill <3s    LUE:  - Skin intact throughout, no open wounds  - No swelling  - No ecchymosis, erythema, or signs of cellulitis  - NonTTP throughout  - AROM and PROM of the shoulder, elbow, wrist, and hand intact without pain  - Axillary/AIN/PIN/Radial/Median/Ulnar Nerves assessed in isolation  without deficit  - SILT throughout  - Compartments soft  - Radial artery palpated   - Capillary Refill <3s    RLE:  - Skin intact throughout, no open wounds  - No swelling  - No ecchymosis, erythema, or signs of cellulitis  - NonTTP throughout  - AROM and PROM of the hip, knee, ankle, and foot intact without pain  - TA/EHL/Gastroc/FHL assessed in isolation without deficit  - SILT throughout  - Compartments soft  - DP palpated  - Capillary Refill <3s      LLE:  - Skin intact throughout, no open wounds  - Ankle effusion present  - No ecchymosis, erythema, or signs of cellulitis  - TTP through anterior ankle joint line as well as anteromedial gutter  - AROM and PROM of the hip, knee, and foot intact without pain  - Pain with AROM and PROM of ankle  - TA/EHL/Gastroc/FHL assessed in isolation without deficit  - SILT throughout  - Compartments soft  - DP palpated  - Capillary Refill <3s        Spine/pelvis/axial body:  No pain with compression of pelvis  No chest wall or abdominal tenderness         Significant Labs: A1C:   Recent Labs   Lab 05/31/24  1051   HGBA1C 5.1     CBC:   Recent Labs   Lab 06/14/24  1625   WBC 11.53   HGB 13.2   HCT 37.9        CMP:   Recent Labs   Lab 06/14/24  1625      K 4.2      CO2 19*   *   BUN 14   CREATININE 1.0   CALCIUM 9.3   PROT 6.7   ALBUMIN 3.3*   BILITOT 1.3*   ALKPHOS 66   AST 43*   ALT 21   ANIONGAP 11       CRP:   Recent Labs   Lab 06/14/24  1625   CRP 72.0*     ESR: 49  All pertinent labs within the past 24 hours have been reviewed.    Significant Imaging: I have reviewed and interpreted all pertinent imaging results/findings.  XR L ankle: No acute fracture or dislocation. Mild soft tissue swelling. Chondrocalcinosis just distal to medial malleolus.   Assessment/Plan:     * Acute left ankle pain  Ayaka Ellison is a 83 y.o. female with a PMHx of  DM, HTN, CKD and right sided breast cancer in 2003 s/p lumpectomy and radiation presents to the ED with  acute onset left ankle pain. Patient had severe ankle pain upon awakening this morning with inability to bear weight. She denies any trauma or recent injury. Patient had subjective fevers and diarrhea yesterday. She also had a dental procedure yesterday. She has no history of septic arthritis or gout. Pertinent physical exam findings include pain with active and passive range of motion, tenderness along anterior joint line and anteromedial gutter, and ankle effusion. ESR 49. CRP 72. Due to concern for septic arthritis vs inflammatory arthropathy, the left ankle was aspirated at the bedside - see procedure note for further details. Aspiration results consistent with pseudogout. Steroids and lidocaine were injected into the left ankle for symptomatic relief. No further orthopedic intervention anticipated at this time. Will follow cultures. WBAT LLE. Rest, OTC medications such as tylenol or ibuprofen for pain control.    Joint Fluid Analysis:  WBC: [id number]  Segs: 88%  Crystals: calcium pyrophosphate crystals  Gram Stain: negative  Cultures pending, will continue to follow     Recent Labs   Lab 06/14/24  1625   WBC 11.53   RBC 3.77*   HGB 13.2   HCT 37.9      *   MCH 35.0*   MCHC 34.8       Recent Labs   Lab 06/14/24  1625   SEDRATE 49*   CRP 72.0*         Procedure Note: Left ankle aspiration  After consent was obtained, using sterile technique the left ankle knee was prepped and the joint was entered from the anteromedial aspect of the ankle with a 21 gauge needle. 3 ml's of serosanguinous colored fluid was withdrawn and sent for analysis and culture. 2mLs of steroid and lidocaine were then injected and the needle was withdrawn. The procedure was well tolerated. Blood loss was minimal. Watch for fever, or increased swelling or persistent pain in knee.               PRINCE Arredondo MD  Orthopedics  [facility] - Emergency Dept

## 2024-06-15 NOTE — ASSESSMENT & PLAN NOTE
Ayaka Ellison is a 83 y.o. female with a PMHx of  DM, HTN, CKD and right sided breast cancer in 2003 s/p lumpectomy and radiation presents to the ED with acute onset left ankle pain. Patient had severe ankle pain upon awakening this morning with inability to bear weight. She denies any trauma or recent injury. Patient had subjective fevers and diarrhea yesterday. She also had a dental procedure yesterday. She has no history of septic arthritis or gout. Pertinent physical exam findings include pain with active and passive range of motion, tenderness along anterior joint line and anteromedial gutter, and ankle effusion. ESR 49. CRP 72. Due to concern for septic arthritis vs inflammatory arthropathy, the left ankle was aspirated at the bedside - see procedure note for further details. Aspiration results consistent with pseudogout. Steroids and lidocaine were injected into the left ankle for symptomatic relief. No further orthopedic intervention anticipated at this time. Will follow cultures. WBAT LLE. Rest, OTC medications such as tylenol or ibuprofen for pain control.    Joint Fluid Analysis:  WBC: [id number]  Segs: 88%  Crystals: calcium pyrophosphate crystals  Gram Stain: negative  Cultures pending, will continue to follow     Recent Labs   Lab 06/14/24  1625   WBC 11.53   RBC 3.77*   HGB 13.2   HCT 37.9      *   MCH 35.0*   MCHC 34.8       Recent Labs   Lab 06/14/24  1625   SEDRATE 49*   CRP 72.0*         Procedure Note: Left ankle aspiration  After consent was obtained, using sterile technique the left ankle knee was prepped and the joint was entered from the anteromedial aspect of the ankle with a 21 gauge needle. 3 ml's of serosanguinous colored fluid was withdrawn and sent for analysis and culture. 2mLs of steroid and lidocaine were then injected and the needle was withdrawn. The procedure was well tolerated. Blood loss was minimal. Watch for fever, or increased swelling or persistent pain in  knee.

## 2024-06-15 NOTE — HPI
Ayaka Ellison is a 83 y.o. female with PMH significant for DM, HTN, CKD and right sided breast cancer in 2003 s/p lumpectomy and radiation presenting with left ankle pain. Patient woke up this morning with left ankle pain and inability to bear weight through the LLE. She denies any trauma or inciting event. Denies a history of gout or septic arthritis. Patient did have a dental procedure yesterday. She also had a subjective fever yesterday as well as diarrhea. Patient denies numbness and tingling. Denies any other musculoskeletal pain or injuries. No known history of prior left ankle injury or surgery. Walks w/out assisted devices at baseline. Lives alone. Doesn't take any anticoagulation at baseline. Last ate last night. Last drank water around 5PM.     They deny IV drug use.  They deny tobacco use.   They drink about 2 to 3 drinks per day.   They deny immunosuppressant medications.  They deny chemotherapy.  They have a history of radiation to the right breast in 2003.

## 2024-06-15 NOTE — PROVIDER PROGRESS NOTES - EMERGENCY DEPT.
Encounter Date: 6/14/2024    ED Physician Progress Notes          Patient is an 83-year-old female with hypertension, hyperlipidemia is presenting with left ankle pain that started this morning.  Chills, no fever.  No history of gout.  Labs were ordered and joint aspiration performed.  Synovial fluid sent off for analysis.  Orthopedic surgery was consulted.  CBC and CMP relatively unremarkable.  Patient is signed out pending results of synovial fluid analysis and ortho recs.    Patient received at sign-out pending significant fluid results and orthopedic recommendations.  Crystals appreciated in fluid, with WBC over 118,000. Reached back out to orthopedic surgery, who concluded that this was pseudogout and not a septic joint.  They injected steroids/lidocaine.  Patient was safely discharged home with strict return precautions.

## 2024-06-17 ENCOUNTER — PATIENT OUTREACH (OUTPATIENT)
Dept: EMERGENCY MEDICINE | Facility: HOSPITAL | Age: 84
End: 2024-06-17
Payer: MEDICARE

## 2024-06-17 LAB
ACID FAST MOD KINY STN SPEC: NORMAL
FUNGUS SPEC CULT: NORMAL
MYCOBACTERIUM SPEC QL CULT: NORMAL

## 2024-06-19 LAB — BACTERIA SPEC ANAEROBE CULT: NORMAL

## 2024-06-20 LAB — BACTERIA SPEC AEROBE CULT: NO GROWTH

## 2024-06-22 LAB — BACTERIA FLD CULT: NORMAL

## 2024-10-02 ENCOUNTER — PATIENT MESSAGE (OUTPATIENT)
Dept: PRIMARY CARE CLINIC | Facility: CLINIC | Age: 84
End: 2024-10-02
Payer: MEDICARE

## 2024-10-08 ENCOUNTER — OFFICE VISIT (OUTPATIENT)
Dept: PRIMARY CARE CLINIC | Facility: CLINIC | Age: 84
End: 2024-10-08
Payer: MEDICARE

## 2024-10-08 VITALS
BODY MASS INDEX: 20.41 KG/M2 | HEART RATE: 54 BPM | HEIGHT: 66 IN | SYSTOLIC BLOOD PRESSURE: 120 MMHG | OXYGEN SATURATION: 97 % | DIASTOLIC BLOOD PRESSURE: 60 MMHG | WEIGHT: 127 LBS

## 2024-10-08 DIAGNOSIS — H93.8X2 LUMP OF EAR, LEFT: Primary | ICD-10-CM

## 2024-10-08 PROCEDURE — 99999 PR PBB SHADOW E&M-EST. PATIENT-LVL III: CPT | Mod: PBBFAC,,, | Performed by: NURSE PRACTITIONER

## 2024-10-08 PROCEDURE — 99213 OFFICE O/P EST LOW 20 MIN: CPT | Mod: PBBFAC | Performed by: NURSE PRACTITIONER

## 2024-10-08 PROCEDURE — 99214 OFFICE O/P EST MOD 30 MIN: CPT | Mod: S$PBB,,, | Performed by: NURSE PRACTITIONER

## 2024-10-08 NOTE — PROGRESS NOTES
"[facility] Note    Chief Complaint      Chief Complaint   Patient presents with    Mass     On inner right ear       History of Present Illness      History of Present Illness    CHIEF COMPLAINT:  Ayaka presents today for evaluation of a growth on her right ear.    RIGHT EAR GROWTH:  She reports a growth on her right ear, initially noticed by her daughter approximately one week ago. The growth may have been present longer but went unnoticed. She denies any pain associated with the growth or changes in her hearing. She describes it as "just there" and "crazy looking." The exact duration of the growth's presence is uncertain, possibly up to a year without her noticing.    SOCIAL HISTORY:  Her daughter assists with her hair care at home. Weekly hair appointments have been recently canceled.      ROS:  ENT: -hearing loss  Skin: +lesion         Past Medical History:  Past Medical History:   Diagnosis Date    Arthritis     Cataract     Glaucoma     Hyperlipidemia     Hypertension     Sleep apnea        Past Surgical History:   has a past surgical history that includes Cholecystectomy; Appendectomy; Breast surgery; Hand surgery (Right); and ANGIOGRAM, CORONARY, WITH LEFT HEART CATHETERIZATION (Left, 2020).    Family History:  family history is not on file.     Social History:  Social History     Tobacco Use    Smoking status: Former     Types: Vaping w/o nicotine     Quit date: 2011     Years since quittin.0    Smokeless tobacco: Never   Substance Use Topics    Alcohol use: Yes    Drug use: No         Medications:  Outpatient Encounter Medications as of 10/8/2024   Medication Sig Dispense Refill    aspirin (ECOTRIN) 81 MG EC tablet Take 81 mg by mouth once daily.      calcium-vitamin D3 500 mg(1,250mg) -200 unit per tablet Take 1 tablet by mouth 2 (two) times daily with meals. (Patient not taking: Reported on 2024)      cyanocobalamin (VITAMIN B-12) 1000 MCG tablet Take 100 mcg by " mouth once daily. (Patient not taking: Reported on 5/31/2024)      ibuprofen (ADVIL,MOTRIN) 600 MG tablet Take 600 mg by mouth every 6 (six) hours as needed for Pain. (Patient not taking: Reported on 5/31/2024)      Lactobac no.41-Bifidobact no.7 (PROBIOTIC-10) 70 mg (3 billion cell) Cap Take by mouth.      latanoprost 0.005 % ophthalmic solution 1 drop every evening.      losartan (COZAAR) 25 MG tablet Take 1 tablet (25 mg total) by mouth once daily. 90 tablet 3    magnesium 250 mg Tab       multivitamin-Ca-iron-minerals 18-0.4 mg Tab Take 1 tablet by mouth.      omega 3,6,9 combination no.7 92 mg (43 mg-22 [id number]) Chew Take by mouth.      potassium citrate 99 mg Cap Take by mouth. (Patient not taking: Reported on 5/31/2024)      simvastatin (ZOCOR) 20 MG tablet Take 20 mg by mouth every evening.       No facility-administered encounter medications on file as of 10/8/2024.       Allergies:  Review of patient's allergies indicates:   Allergen Reactions    Penicillins Itching    Codeine Nausea Only       Health Maintenance:  Immunization History   Administered Date(s) Administered    COVID-19, MRNA, LN-S, PF (Pfizer) (Purple Cap) 01/10/2021, 01/31/2021    Influenza - Quadrivalent - PF *Preferred* (6 months and older) 10/29/2011, 01/09/2013, 10/04/2015, 09/16/2016    Influenza - Trivalent - Fluad - Adjuvanted - PF (65 years and older 09/09/2017    Influenza - Trivalent - Fluzone High Dose - PF (65 years and older) 09/26/2019    Influenza A (H1N1) 2009 Monovalent - IM 01/14/2010    Pneumococcal Conjugate - 13 Valent 10/04/2015    Pneumococcal Polysaccharide - 23 Valent 09/16/2016    Tdap 09/13/2021    Zoster 10/04/2015      Health Maintenance   Topic Date Due    Shingles Vaccine (2 of 3) 11/29/2015    DEXA Scan  08/27/2021    Eye Exam  11/27/2024    Hemoglobin A1c  11/30/2024    Lipid Panel  05/31/2025    TETANUS VACCINE  09/13/2031        Physical Exam      Vital Signs  Pulse: (!) 54  SpO2: 97 %  BP: 120/60  BP  "Location: Right arm  Pain Score: 0-No pain  Height and Weight  Height: 5' 6" (167.6 cm)  Weight: 57.6 kg (126 lb 15.8 oz)  BSA (Calculated - sq m): 1.64 sq meters  BMI (Calculated): 20.5  Weight in (lb) to have BMI = 25: 154.6]    Physical Exam  Constitutional:       Appearance: She is well-developed.   HENT:      Head: Normocephalic and atraumatic.      Ears:      Comments: Cyst noted to right ear lobe    Cardiovascular:      Rate and Rhythm: Normal rate and regular rhythm.      Heart sounds: Normal heart sounds. No murmur heard.  Pulmonary:      Effort: Pulmonary effort is normal. No respiratory distress.      Breath sounds: Normal breath sounds.   Abdominal:      General: There is no distension.      Palpations: Abdomen is soft.      Tenderness: There is no abdominal tenderness. There is no guarding.   Skin:     General: Skin is warm and dry.      Coloration: Pallor: lesion noted to right tragus.   Neurological:      Mental Status: She is alert. Mental status is at baseline.   Psychiatric:         Behavior: Behavior normal.          Laboratory:  CBC:  Recent Labs   Lab 05/31/24  1051 06/14/24  1625   WBC 5.55 11.53   RBC 4.28 3.77 L   Hemoglobin 15.0 13.2   Hematocrit 44.9 37.9   Platelets 253 245    H 101 H   MCH 35.0 H 35.0 H   MCHC 33.4 34.8     CMP:  Recent Labs   Lab 05/31/24  1051 06/14/24  1625   Glucose 116 H 134 H   Calcium 9.9 9.3   Albumin 3.6 3.3 L   Total Protein 6.7 6.7   Sodium 141 136   Potassium 4.3 4.2   CO2 27 19 L   Chloride 106 106   BUN 11 14   Alkaline Phosphatase 62 66   ALT 24 21   AST 35 43 H   Total Bilirubin 0.5 1.3 H     URINALYSIS:       LIPIDS:  Recent Labs   Lab 05/31/24  1051   HDL 63   Cholesterol 228 H   Triglycerides 175 H   LDL Cholesterol 130.0   HDL/Cholesterol Ratio 27.6   Non-HDL Cholesterol 165   Total Cholesterol/HDL Ratio 3.6     TSH:      A1C:  Recent Labs   Lab 05/31/24  1051   Hemoglobin A1C 5.1       Assessment/Plan     Ayaka Ellison is a 84 y.o.female " with:    Assessment & Plan    Identified a soft, squishy, painless growth on patient's right ear  Determined surgical removal and or biopsy likely necessary      ENT REFERRAL:  - Referred patient to ENT (Ear, Nose, and Throat) specialist for evaluation and potential surgical removal of ear lesion  - Ayaka or family to call private ENT office to schedule appointment.         1. Lump of ear, left  - Ambulatory referral/consult to ENT; Future       Chronic conditions status updated as per HPI.  Other than changes above, cont current medications and maintain follow up with specialists.  No follow-ups on file.    Future Appointments   Date Time Provider Department Center   2/10/2025  2:30 PM Ruiz Morton MD [facility]       This note was generated with the assistance of ambient listening technology. Verbal consent was obtained by the patient and accompanying visitor(s) for the recording of patient appointment to facilitate this note. I attest to having reviewed and edited the generated note for accuracy, though some syntax or spelling errors may persist. Please contact the author of this note for any clarification.      [facility]

## 2024-10-09 ENCOUNTER — OFFICE VISIT (OUTPATIENT)
Dept: DERMATOLOGY | Facility: CLINIC | Age: 84
End: 2024-10-09
Payer: MEDICARE

## 2024-10-09 DIAGNOSIS — D48.5 NEOPLASM OF UNCERTAIN BEHAVIOR OF SKIN: Primary | ICD-10-CM

## 2024-10-09 PROCEDURE — 11102 TANGNTL BX SKIN SINGLE LES: CPT | Mod: PBBFAC,PO | Performed by: DERMATOLOGY

## 2024-10-09 PROCEDURE — 99499 UNLISTED E&M SERVICE: CPT | Mod: S$PBB,,, | Performed by: DERMATOLOGY

## 2024-10-09 PROCEDURE — 88305 TISSUE EXAM BY PATHOLOGIST: CPT | Performed by: PATHOLOGY

## 2024-10-09 PROCEDURE — 99999 PR PBB SHADOW E&M-EST. PATIENT-LVL III: CPT | Mod: PBBFAC,,, | Performed by: DERMATOLOGY

## 2024-10-09 PROCEDURE — 99213 OFFICE O/P EST LOW 20 MIN: CPT | Mod: PBBFAC,PO,25 | Performed by: DERMATOLOGY

## 2024-10-09 PROCEDURE — 11102 TANGNTL BX SKIN SINGLE LES: CPT | Mod: S$PBB,,, | Performed by: DERMATOLOGY

## 2024-10-09 NOTE — PROGRESS NOTES
Subjective:      Patient ID:  Ayaka Ellison is a 84 y.o. female who presents for   Chief Complaint   Patient presents with    Lesion     ear     Lesion - Initial  Affected locations: right ear  Duration: 6 months  Signs / symptoms: asymptomatic (does not recall trauma to this site)  Aggravated by: nothing  Relieving factors/Treatments tried: nothing      Review of Systems   Skin:  Negative for tendency to form keloidal scars.   Hematologic/Lymphatic: Does not bruise/bleed easily.       Objective:   Physical Exam   Skin:   Areas Examined (abnormalities noted in diagram):   Head / Face Inspection Performed                  Diagram Legend     Erythematous scaling macule/papule c/w actinic keratosis       Vascular papule c/w angioma      Pigmented verrucoid papule/plaque c/w seborrheic keratosis      Yellow umbilicated papule c/w sebaceous hyperplasia      Irregularly shaped tan macule c/w lentigo     1-2 mm smooth white papules consistent with Milia      Movable subcutaneous cyst with punctum c/w epidermal inclusion cyst      Subcutaneous movable cyst c/w pilar cyst      Firm pink to brown papule c/w dermatofibroma      Pedunculated fleshy papule(s) c/w skin tag(s)      Evenly pigmented macule c/w junctional nevus     Mildly variegated pigmented, slightly irregular-bordered macule c/w mildly atypical nevus      Flesh colored to evenly pigmented papule c/w intradermal nevus       Pink pearly papule/plaque c/w basal cell carcinoma      Erythematous hyperkeratotic cursted plaque c/w SCC      Surgical scar with no sign of skin cancer recurrence      Open and closed comedones      Inflammatory papules and pustules      Verrucoid papule consistent consistent with wart     Erythematous eczematous patches and plaques     Dystrophic onycholytic nail with subungual debris c/w onychomycosis     Umbilicated papule    Erythematous-base heme-crusted tan verrucoid plaque consistent with inflamed seborrheic keratosis      Erythematous Silvery Scaling Plaque c/w Psoriasis     See annotation      Assessment / Plan:      Pathology Orders:       Normal Orders This Visit    Specimen to Pathology, Dermatology     Comments:    Number of Specimens:->1  ------------------------->-------------------------  Spec 1 Procedure:->Biopsy  Spec 1 Clinical Impression:->r/o neurofibroma v other  Spec 1 Source:->right tragus    Questions:    Procedure Type: Dermatology and skin neoplasms    Number of Specimens: 1    ------------------------: -------------------------    Spec 1 Procedure: Biopsy    Spec 1 Clinical Impression: r/o neurofibroma v other    Spec 1 Source: right tragus    Release to patient:           Neoplasm of uncertain behavior of skin  Shave biopsy procedure note:    Shave biopsy performed after verbal consent including risk of infection, scar, recurrence, need for additional treatment of site. Area prepped with alcohol, anesthetized with approximately 1.0cc of 1% lidocaine with epinephrine. Lesional tissue shaved with razor blade. Hemostasis achieved with application of aluminum chloride followed by hyfrecation. No complications. Dressing applied. Wound care explained.    -     Specimen to Pathology, Dermatology             Follow up for prn bx report.

## 2024-10-09 NOTE — PATIENT INSTRUCTIONS
Shave Biopsy Wound Care    Your doctor has performed a shave biopsy today.  A band aid and vaseline ointment has been placed over the site.  This should remain in place for NO LONGER THAN 48 hours.  It is fine to remove the bandaid after 24 hours, if the area is no longer bleeding. It is recommended that you keep the area dry (do not wet)) for the first 24 hours.  After 24 hours, wash the area with warm soap and water and apply Vaseline jelly.  Many patients prefer to use Neosporin or Bacitracin ointment.  This is acceptable; however, know that you can develop an allergy to this medication even if you have used it safely for years.  It is important to keep the area moist.  Letting it dry out and get air slows healing time, and will worsen the scar.        If you notice increasing redness, tenderness, pain, or yellow drainage at the biopsy site, please notify your doctor.  These are signs of an infection.    If your biopsy site is bleeding, apply firm pressure for 15 minutes straight.  Repeat for another 15 minutes, if it is still bleeding.   If the surgical site continues to bleed, then please contact your doctor.      For [facility] users:   You will receive your biopsy results in [facility] as soon as they are available. Please be assured that your physician/provider will review your results and will then determine what further treatment, evaluation, or planning is required. You should be contacted by your physician's/provider's office within 5 business days of receiving your results; If not, please reach out to directly. This is one more way [facility] is putting you first.     [facility] [place] [address]/ [phone] FAX/ [email or web address]

## 2024-10-11 LAB
FINAL PATHOLOGIC DIAGNOSIS: NORMAL
GROSS: NORMAL
Lab: NORMAL
MICROSCOPIC EXAM: NORMAL

## 2024-11-01 ENCOUNTER — OFFICE VISIT (OUTPATIENT)
Dept: OTOLARYNGOLOGY | Facility: CLINIC | Age: 84
End: 2024-11-01
Payer: MEDICARE

## 2024-11-01 VITALS — SYSTOLIC BLOOD PRESSURE: 163 MMHG | HEART RATE: 51 BPM | DIASTOLIC BLOOD PRESSURE: 82 MMHG

## 2024-11-01 DIAGNOSIS — D21.0 BENIGN NEOPLASM OF CONNECTIVE AND OTHER SOFT TISSUE OF HEAD, FACE AND NECK: ICD-10-CM

## 2024-11-01 DIAGNOSIS — H93.8X1 EAR MASS, RIGHT: Primary | ICD-10-CM

## 2024-11-01 PROCEDURE — 99213 OFFICE O/P EST LOW 20 MIN: CPT | Mod: PBBFAC | Performed by: STUDENT IN AN ORGANIZED HEALTH CARE EDUCATION/TRAINING PROGRAM

## 2024-11-01 PROCEDURE — 99999 PR PBB SHADOW E&M-EST. PATIENT-LVL III: CPT | Mod: PBBFAC,,, | Performed by: STUDENT IN AN ORGANIZED HEALTH CARE EDUCATION/TRAINING PROGRAM

## 2024-11-01 NOTE — H&P (VIEW-ONLY)
OTOLARYNGOLOGY- FACIAL PLASTIC & RECONSTRUCTIVE SURGERY      Ayaka Ellison  [id number]    CC:  Right ear mass    HISTORY OF PRESENT ILLNESS: Ayaka Ellison  is a 84 y.o. female who was referred by Dr. Bacon for ear lesion.  Ayaka reports she noticed this lesion starting in May 20, 2024.  She feels that the lesion has been steadily growing since then.  She denies any pain or hearing difficulty.  She states it has not drained.    She is otherwise healthy, has a history of high blood pressure, hyperlipidemia and is on a baby aspirin daily.      Path 10/9/24:    Final Pathologic Diagnosis 1. Skin, right tragus, shave biopsy:  - ECCRINE SPIRADENOMA WITH CYLINDROMA-LIKE FEATURES, INCOMPLETELY EXCISED.    This lesion is benign.         Occupation/Family:  She is present with her daughter who is friendly with Dr. Bacon      Past Medical History  She has a past medical history of Arthritis, Cataract, Glaucoma, Hyperlipidemia, Hypertension, and Sleep apnea.    Past Surgical History  She has a past surgical history that includes Cholecystectomy; Appendectomy; Breast surgery; Hand surgery (Right); and ANGIOGRAM, CORONARY, WITH LEFT HEART CATHETERIZATION (Left, 11/2/2020).    Family History  Her family history is not on file.    Social History  She reports that she quit smoking about 13 years ago. Her smoking use included vaping w/o nicotine. She has never used smokeless tobacco. She reports current alcohol use. She reports that she does not use drugs.    Allergies  She is allergic to penicillins and codeine.    Medications  She has a current medication list which includes the following prescription(s): aspirin, calcium-vitamin d3, cyanocobalamin, ibuprofen, lactobac no.41-bifidobact no.7, latanoprost, losartan, magnesium, multivitamin-ca-iron-minerals, omega 3,6,9 combination no.7, potassium citrate, and simvastatin.      Review of Systems  See above    PHYSICAL EXAM:  There were no vitals taken for this  visit.    General: Alert and oriented in no acute distress  Head and Face: Normocephaic, atruamatic  Nose: Anterior rhinoscopy reveals overall normal appearing turbinates, septum midline, no obvious lesions or masses  Neurological: Cranial nerves are grossly intact  Skin: Skin texture/appearance is appropriate for age  Eyes:   EOMI, sclera clear  Ears:  There is approximately 2.1 cm lesion just inferior and lateral to the tragus.  It is mobile but appears somewhat tethered to the tragus on palpation.  Nonpainful  EACs healthy appearing bilaterally  TMs clear without LUKASZ or perforation bilaterally  Oral cavity and Oropharynx: Mucous membranes moist without lesions present.  Tongue protrudes midline and palate elevates midline.  Neck: Supple without LAD.    Lungs:breathing comfortably  Psychiatric:  Mood and affect are normal        Imaging:  None    ASSESSMENT:   1. Ear mass, right            PLAN:     I discussed in depth the risks, benefits, and alternatives, and the patient and her daughter have elected for excision and reconstruction likely with postauricular FTSG vs. Composite cartilage graft.  I discussed with the patient that this could be done under local anesthesia, however upon further discussion she may like some additional anxiolysis throughout the procedure, which I think is reasonable.        I had an elaborate discussion with the patient and her daughter  regarding her condition and the further workup and management options. They are in understanding of the above stated plan.    I have spent approximately 45 minutes on this patient encounter. This includes face to face time and non-face to face time preparing to see the patient (eg, review of tests), obtaining and/or reviewing separately obtained history, documenting clinical information in the electronic or other health record, independently interpreting results and communicating results to the patient/family/caregiver, or care coordinator.      Denis  Ang HAMMONDS  Facial Plastic and Reconstructive Surgeon  [facility] Department of Otolaryngology - Head & Neck Surgery

## 2024-11-07 NOTE — ANESTHESIA PAT ROS NOTE
11/07/2024  Ayaka Ellison is a 84 y.o., female.      Pre-op Assessment          Review of Systems           Anesthesia Assessment: Preoperative EQUATION    Planned Procedure: Procedure(s) (LRB):  EXCISION, LESION, EAR (Right)  Requested Anesthesia Type:Local MAC  Surgeon: Denis Fry MD  Service: ENT  Known or anticipated Date of Surgery:11/12/2024    Surgeon notes: reviewed    Electronic QUestionnaire Assessment completed via nurse interview with patient.        Triage considerations:     The patient has no apparent active cardiac condition (No unstable coronary Syndrome such as severe unstable angina or recent [<1 month] myocardial infarction, decompensated CHF, severe valvular   disease or significant arrhythmia)    Previous anesthesia records:None    Last PCP note: within 1 month   Subspecialty notes: Dermatology, Hematology/Oncology    Other important co-morbidities: DM2 and JOVANNA      Tests already available:  No recent tests.             Instructions given. (See in Nurse's note)    Optimization:  Anesthesia Preop Clinic Assessment  Indicated    Medical Opinion Indicated       Sub-specialist consult indicated:   TBD       Plan:    Testing:  None Needed   Pre-anesthesia  visit       Visit focus: to be determined     Consultation:to be determined     Patient  has previously scheduled Medical Appointment:    Navigation: Tests Scheduled.              Consults scheduled.             Results will be tracked by Preop Clinic.                 Straight Line to surgery.               No tests, anesthesia preop clinic visit, or consult required.

## 2024-11-11 ENCOUNTER — TELEPHONE (OUTPATIENT)
Dept: OTOLARYNGOLOGY | Facility: CLINIC | Age: 84
End: 2024-11-11
Payer: MEDICARE

## 2024-11-11 NOTE — TELEPHONE ENCOUNTER
----- Message from Med Assistant Perry sent at 11/11/2024  1:11 PM CST -----  Regarding: FW: Pre op instruction  Contact: [phone]    ----- Message -----  From: Beata Forrester RN  Sent: 11/11/2024  12:17 PM CST  To: Orlando Del Toro MA  Subject: FW: Pre op instruction                             ----- Message -----  From: Lucinda Barfield  Sent: 11/11/2024  12:08 PM CST  To: Ang Barrios Staff  Subject: Pre op instruction                               Type:  Needs Medical Advice    Who Called: Ayaka    Calling for pre op instruction for procedure scheduled for 11/12   Would the patient rather a call back or a response via [facility]? Both  Best Call Back Number: [phone]    Additional Information:

## 2024-11-11 NOTE — PRE-PROCEDURE INSTRUCTIONS
Patient reviewed on 11/11/2024.  Okay to proceed at [facility]. The following pre-procedure instructions and arrival time have been reviewed with patient's daughter via phone and sent to patient portal for review.  Patient's daughter verbalized an understanding.  Pt to be accompanied by her daughter day of procedure as responsible .      lucho CLARK ,     You are scheduled for a procedure with Dr. Fry on 11/12/2024. Your scheduled arrival time is 8:15 am.  This arrival time is roughly 2 hours before your anticipated procedure time to allow sufficient time for pre-op.  Please wear comfortable clothes.  This procedure will take place at the [facility] at the corner of [facility] and [facility].  It is in the [place] next to [facility].  The address is:     [address] [place] [address].  [place] [address]     After entering the building, you will proceed to the second floor where you can check in with registration. You should take any medications that you routinely take for blood pressure (other than those listed below), heart medications, thyroid, cholesterol, etc.      If you wear contact lenses, please wear glasses to your procedure.     Your fasting instructions are as follow:  Nothing to eat after midnight the evening before your surgery. Anesthesia encourages you to drink clear liquids up until 2 hours prior to your arrival time to ensure that you are adequately hydrated. You MUST have a responsible adult to bring you home.        The evening before and morning of your procedure, please hold the following medications:  -Aspirin and Aspirin-containing products (Goody's powder, Excedrin)  -NSAIDs (Advil, Ibuprofen, Aleve, Diclofenac)  -Vitamins/Supplements  -Herbal remedies/Teas  -Stimulants (Adderall, Vyvanse, Adipex)  -Diabetic medication (Please bring with you day of procedure)  -Prescription creams/gels/lotions        -May take Tylenol        The evening before  Pt voids 300 ml dark yellow urine   and morning of your procedure, take a shower using antibacterial soap (ex: Hibiclens or Dial antibacterial soap). DO NOT apply deodorant, lotion, cologne, or anything else to the skin. Wear loose, comfortable fitting clothing. Do not wear jewelry or bring any valuables with you. If you wear dentures or contacts, please bring your case with you or leave them at home. Use and bring any inhalers that you may have.     If you have any procedure-specific questions, please call your surgeon's office. Any other questions, don't hesitate to call at [phone].     Thanks,  AUGUSTO Barney  Pre-Admit Testing  Anesthesia Dept OC

## 2024-11-12 ENCOUNTER — ANESTHESIA (OUTPATIENT)
Dept: SURGERY | Facility: HOSPITAL | Age: 84
End: 2024-11-12
Payer: MEDICARE

## 2024-11-12 ENCOUNTER — HOSPITAL ENCOUNTER (OUTPATIENT)
Facility: HOSPITAL | Age: 84
Discharge: HOME | End: 2024-11-12
Attending: STUDENT IN AN ORGANIZED HEALTH CARE EDUCATION/TRAINING PROGRAM | Admitting: STUDENT IN AN ORGANIZED HEALTH CARE EDUCATION/TRAINING PROGRAM
Payer: MEDICARE

## 2024-11-12 ENCOUNTER — ANESTHESIA EVENT (OUTPATIENT)
Dept: SURGERY | Facility: HOSPITAL | Age: 84
End: 2024-11-12
Payer: MEDICARE

## 2024-11-12 VITALS
SYSTOLIC BLOOD PRESSURE: 130 MMHG | TEMPERATURE: 98 F | RESPIRATION RATE: 19 BRPM | OXYGEN SATURATION: 93 % | HEART RATE: 48 BPM | DIASTOLIC BLOOD PRESSURE: 56 MMHG

## 2024-11-12 DIAGNOSIS — D23.9 ECCRINE SPIRADENOMA: Primary | ICD-10-CM

## 2024-11-12 PROCEDURE — 37000008 HC ANESTHESIA 1ST 15 MINUTES: Performed by: STUDENT IN AN ORGANIZED HEALTH CARE EDUCATION/TRAINING PROGRAM

## 2024-11-12 PROCEDURE — 63600175 PHARM REV CODE 636 W HCPCS: Performed by: STUDENT IN AN ORGANIZED HEALTH CARE EDUCATION/TRAINING PROGRAM

## 2024-11-12 PROCEDURE — 88307 TISSUE EXAM BY PATHOLOGIST: CPT | Performed by: DERMATOLOGY

## 2024-11-12 PROCEDURE — 88307 TISSUE EXAM BY PATHOLOGIST: CPT | Mod: 26,,, | Performed by: DERMATOLOGY

## 2024-11-12 PROCEDURE — 63600175 PHARM REV CODE 636 W HCPCS: Performed by: REGISTERED NURSE

## 2024-11-12 PROCEDURE — 94761 N-INVAS EAR/PLS OXIMETRY MLT: CPT

## 2024-11-12 PROCEDURE — 25000003 PHARM REV CODE 250: Performed by: STUDENT IN AN ORGANIZED HEALTH CARE EDUCATION/TRAINING PROGRAM

## 2024-11-12 PROCEDURE — 71000033 HC RECOVERY, INTIAL HOUR: Performed by: STUDENT IN AN ORGANIZED HEALTH CARE EDUCATION/TRAINING PROGRAM

## 2024-11-12 PROCEDURE — 25000003 PHARM REV CODE 250: Performed by: REGISTERED NURSE

## 2024-11-12 PROCEDURE — 36000706: Performed by: STUDENT IN AN ORGANIZED HEALTH CARE EDUCATION/TRAINING PROGRAM

## 2024-11-12 PROCEDURE — 99900035 HC TECH TIME PER 15 MIN (STAT)

## 2024-11-12 PROCEDURE — 14061 TIS TRNFR E/N/E/L10.1-30SQCM: CPT | Mod: ,,, | Performed by: STUDENT IN AN ORGANIZED HEALTH CARE EDUCATION/TRAINING PROGRAM

## 2024-11-12 PROCEDURE — 36000707: Performed by: STUDENT IN AN ORGANIZED HEALTH CARE EDUCATION/TRAINING PROGRAM

## 2024-11-12 PROCEDURE — 37000009 HC ANESTHESIA EA ADD 15 MINS: Performed by: STUDENT IN AN ORGANIZED HEALTH CARE EDUCATION/TRAINING PROGRAM

## 2024-11-12 PROCEDURE — 71000015 HC POSTOP RECOV 1ST HR: Performed by: STUDENT IN AN ORGANIZED HEALTH CARE EDUCATION/TRAINING PROGRAM

## 2024-11-12 RX ORDER — FENTANYL CITRATE 50 UG/ML
INJECTION, SOLUTION INTRAMUSCULAR; INTRAVENOUS
Status: DISCONTINUED | OUTPATIENT
Start: 2024-11-12 | End: 2024-11-12

## 2024-11-12 RX ORDER — BUPIVACAINE HYDROCHLORIDE AND EPINEPHRINE 5; 5 MG/ML; UG/ML
INJECTION, SOLUTION EPIDURAL; INTRACAUDAL; PERINEURAL
Status: DISCONTINUED | OUTPATIENT
Start: 2024-11-12 | End: 2024-11-12 | Stop reason: HOSPADM

## 2024-11-12 RX ORDER — MIDAZOLAM HYDROCHLORIDE 1 MG/ML
INJECTION INTRAMUSCULAR; INTRAVENOUS
Status: DISCONTINUED | OUTPATIENT
Start: 2024-11-12 | End: 2024-11-12

## 2024-11-12 RX ORDER — KETAMINE HYDROCHLORIDE 100 MG/ML
INJECTION, SOLUTION INTRAMUSCULAR; INTRAVENOUS
Status: DISCONTINUED | OUTPATIENT
Start: 2024-11-12 | End: 2024-11-12

## 2024-11-12 RX ORDER — DEXMEDETOMIDINE HYDROCHLORIDE 100 UG/ML
INJECTION, SOLUTION INTRAVENOUS
Status: DISCONTINUED | OUTPATIENT
Start: 2024-11-12 | End: 2024-11-12

## 2024-11-12 RX ORDER — LIDOCAINE HYDROCHLORIDE AND EPINEPHRINE 10; 10 UG/ML; MG/ML
INJECTION, SOLUTION INFILTRATION; PERINEURAL
Status: DISCONTINUED | OUTPATIENT
Start: 2024-11-12 | End: 2024-11-12 | Stop reason: HOSPADM

## 2024-11-12 RX ORDER — PROPOFOL 10 MG/ML
VIAL (ML) INTRAVENOUS
Status: DISCONTINUED | OUTPATIENT
Start: 2024-11-12 | End: 2024-11-12

## 2024-11-12 RX ADMIN — DEXMEDETOMIDINE HYDROCHLORIDE 28 MCG: 100 INJECTION, SOLUTION INTRAVENOUS at 10:11

## 2024-11-12 RX ADMIN — FENTANYL CITRATE 25 MCG: 50 INJECTION, SOLUTION INTRAMUSCULAR; INTRAVENOUS at 10:11

## 2024-11-12 RX ADMIN — SODIUM CHLORIDE: 0.9 INJECTION, SOLUTION INTRAVENOUS at 09:11

## 2024-11-12 RX ADMIN — GLYCOPYRROLATE 0.2 MG: 0.2 INJECTION, SOLUTION INTRAMUSCULAR; INTRAVENOUS at 10:11

## 2024-11-12 RX ADMIN — MIDAZOLAM HYDROCHLORIDE 1 MG: 1 INJECTION, SOLUTION INTRAMUSCULAR; INTRAVENOUS at 09:11

## 2024-11-12 RX ADMIN — KETAMINE HYDROCHLORIDE 20 MG: 100 INJECTION INTRAMUSCULAR; INTRAVENOUS at 10:11

## 2024-11-12 RX ADMIN — FENTANYL CITRATE 25 MCG: 50 INJECTION, SOLUTION INTRAMUSCULAR; INTRAVENOUS at 09:11

## 2024-11-12 RX ADMIN — PROPOFOL 30 MG: 10 INJECTION, EMULSION INTRAVENOUS at 10:11

## 2024-11-12 RX ADMIN — DEXMEDETOMIDINE HYDROCHLORIDE 1 MCG/KG/HR: 100 INJECTION, SOLUTION INTRAVENOUS at 10:11

## 2024-11-12 RX ADMIN — PROPOFOL 25 MG: 10 INJECTION, EMULSION INTRAVENOUS at 10:11

## 2024-11-12 NOTE — TRANSFER OF CARE
Anesthesia Transfer of Care Note    Patient: Ayaka Ellison    Procedure(s) Performed: Procedure(s) (LRB):  EXCISION, LESION, EAR (Right)  ADJACENT TISSUE TRANSFER (Right)    Patient location: PACU    Anesthesia Type: MAC    Transport from OR: Transported from OR on room air with adequate spontaneous ventilation    Post pain: adequate analgesia    Post assessment: no apparent anesthetic complications    Post vital signs: stable    Level of consciousness: sedated    Nausea/Vomiting: no nausea/vomiting    Complications: none    Transfer of care protocol was followed    Last vitals: Visit Vitals  BP (!) 150/65 (BP Location: Right arm, Patient Position: Lying)   Pulse (!) 52   Temp 36.6 °C (97.8 °F) (Temporal)   Resp 16   SpO2 98%   Breastfeeding No

## 2024-11-12 NOTE — OP NOTE
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       Procedure(s) Performed:      Excision of spiradenoma and advancement rotational flap closure of the right preauricular  and post tragal area, 5cm x 2.5cm = 12.5cm sq, CPT 16701    Attending Surgeon: Denis Fry MD  Resident (trainee): none       Anesthesia: General    Specimens: none.   Drains: None.   Estimated Blood Loss: 5 mL.      Operative Findings:    Soft tissue mass excised, well developed plane between the post tragal cartilage and mass.  Advancement rotational flap of significantly lax preauricular skin allowed for tension free closure of the defect.     Procedure: After informed consent was obtained from the patient, the patient was brought from the preoperative holding area to the operating room and placed supine on the operating room table. After smooth induction of general anesthesia and all parties were in agreement.       The patient was turned toward the surgical team.  Next, the mass was marked and local anesthesia was injected.  Next, the mass was removed sharply using a combination of curved iris scissors and a 15 blade scalpel.  There was a well-developed plane between the post tragal cartilage and soft tissue lesion.  This was sharply dissected and removed.  There was an approximately 2.5 cm defect emanating from the post tragal skin down to the lateral EAC.  The specimen was sent for permanent pathology.    Next we proceeded with reconstruction of the defect.  Given the patient's significant laxity in her preauricular skin, a subdermal plane was elevated sharply using curved iris scissors.  Next, back cuts were made in order to advance and rotate the soft tissue flap into the defect.  The flap was tailored  to the defect sharply with curved iris scissors.  The wound was thoroughly irrigated with saline.  Next, the flap was inset using 5 0 Vicryl on a P2 needle in the deep layer and 5 0 fast gut in a simple interrupted fashion for skin.  Next, the pretragal dead space was closed with a mattress 4 0 nylon suture which additionally allowed for improved contour in the pretragal skin.    The wound was dressed with antibiotic ointment. The patient's care was turned over to the Anesthesia team who successfully awakened the patient without event. The patient was transported to the PACU in stable condition. All instrument, sharp and sponge counts were correct at the end of the case.      I was present for the entire surgery and personally performed all critical aspects of the surgery. I directly supervised the resident physician throughout the case.     Denis Fry MD  Facial Plastic and Reconstructive Surgeon  [facility] Department of Otolaryngology - Head & Neck Surgery

## 2024-11-12 NOTE — ANESTHESIA POSTPROCEDURE EVALUATION
Anesthesia Post Evaluation    Patient: Ayaka Ellison    Procedure(s) Performed: Procedure(s) (LRB):  EXCISION, LESION, EAR (Right)  ADJACENT TISSUE TRANSFER (Right)    Final Anesthesia Type: general      Patient location during evaluation: PACU  Patient participation: Yes- Able to Participate  Level of consciousness: awake and alert and oriented  Post-procedure vital signs: reviewed and stable  Pain management: adequate  Airway patency: patent  JOVANNA mitigation strategies: Multimodal analgesia  PONV status at discharge: No PONV  Anesthetic complications: no      Cardiovascular status: hemodynamically stable  Respiratory status: spontaneous ventilation and unassisted  Hydration status: euvolemic  Follow-up not needed.              Vitals Value Taken Time   /56 11/12/24 1203   Temp 36.6 °C (97.8 °F) 11/12/24 1125   Pulse 49 11/12/24 1207   Resp 31 11/12/24 1207   SpO2 94 % 11/12/24 1207   Vitals shown include unfiled device data.      No case tracking events are documented in the log.      Pain/Mavis Score: Mavis Score: 9 (11/12/2024 11:30 AM)

## 2024-11-12 NOTE — PLAN OF CARE
Pt in preop bay 40, VSS, and IV inserted. Pt denies any open wounds on body or the use of any weight loss injections. Pt needs procedural consents signed, admit order, site marker, updated H&P, otherwise ready to roll.

## 2024-11-12 NOTE — DISCHARGE INSTRUCTIONS
FACIAL SURGERY INSTRUCTIONS      Your sutures are dissolvable. Please place aquaphor twice daily to your incision. The incision may accumulate some crusts which may be gently cleansed with hydrogen peroxide on a q tip. Avoid rubbing the incision. Dab gently when cleaning.  You can start showering tomorrow. Avoid direct water hitting your incision.   Please do not soak your incision underwater in a bath or pool for 4 weeks.  You may have some soreness, swelling and bruising after surgery. You can take extra strength tylenol over the counter as directed. Do not take more than 4000 mg in 24 hours  If you have any questions or concerns, you can call  ([employer]), [phone] ([place]), or if after hours call the  at [facility] on [address]:  (Ask to speak to the ENT resident or physician on call). If it is a medical emergency please call 911.

## 2024-11-12 NOTE — DISCHARGE SUMMARY
[facility]  Discharge Note  Short Stay    Procedure(s) (LRB):  EXCISION, LESION, EAR (Right)  ADJACENT TISSUE TRANSFER (Right)      OUTCOME: Patient tolerated treatment/procedure well without complication and is now ready for discharge.    DISPOSITION: Home or Self Care    FINAL DIAGNOSIS:  <principal problem not specified>    FOLLOWUP: In clinic    DISCHARGE INSTRUCTIONS:  No discharge procedures on file.     TIME SPENT ON DISCHARGE: 20 minutes

## 2024-11-12 NOTE — ANESTHESIA PREPROCEDURE EVALUATION
[facility]  Anesthesia Pre-Operative Evaluation         Patient Name: Ayaka Ellison  YOB: 1940  MRN: [id number]    SUBJECTIVE:     Pre-operative evaluation for Procedure(s) (LRB):  EXCISION, LESION, EAR (Right)     11/12/2024    Ayaka Ellison is a 84 y.o. female w/ a significant PMHx of DM2, CKD3, HTN, HLD, JOVANNA, breast cancer s/p .    Patient now presents for the above procedure(s).    TTE:   No echocardiogram results found for the past 12 months    TTE 2020    The left ventricle is normal in size with normal systolic function. The estimated ejection fraction is 68%.  There is left ventricular concentric remodeling.  Septal wall has abnormal motion but normal contractility.  Normal left ventricular diastolic function.  Normal right ventricular systolic function.  Mild aortic regurgitation.  The MR is mild to mild-moderate (1-2+).  Normal central venous pressure (3 mmHg).    Patient Active Problem List   Diagnosis    Type 2 diabetes mellitus with stage 3a chronic kidney disease, without long-term current use of insulin    Hypertensive kidney disease with stage 3a chronic kidney disease    Hyperlipidemia, mixed    History of breast cancer    Obstructive sleep apnea    Chronic kidney disease, stage 3a    Atherosclerosis of aorta    Acute left ankle pain       Review of patient's allergies indicates:   Allergen Reactions    Penicillins Itching    Codeine Nausea Only       Current Inpatient Medications:      No current facility-administered medications on file prior to encounter.     Current Outpatient Medications on File Prior to Encounter   Medication Sig Dispense Refill    aspirin (ECOTRIN) 81 MG EC tablet Take 81 mg by mouth once daily.      calcium-vitamin D3 500 mg(1,250mg) -200 unit per tablet Take 1 tablet by mouth 2 (two) times daily with meals.      cyanocobalamin (VITAMIN B-12) 1000 MCG tablet Take 100 mcg by mouth once daily.      ibuprofen (ADVIL,MOTRIN) 600 MG  tablet Take 600 mg by mouth every 6 (six) hours as needed for Pain.      Lactobac no.41-Bifidobact no.7 (PROBIOTIC-10) 70 mg (3 billion cell) Cap Take by mouth.      latanoprost 0.005 % ophthalmic solution 1 drop every evening.      losartan (COZAAR) 25 MG tablet Take 1 tablet (25 mg total) by mouth once daily. 90 tablet 3    magnesium 250 mg Tab       multivitamin-Ca-iron-minerals 18-0.4 mg Tab Take 1 tablet by mouth.      omega 3,6,9 combination no.7 92 mg (43 mg-22 [id number]) Chew Take by mouth.      potassium citrate 99 mg Cap Take by mouth.      simvastatin (ZOCOR) 20 MG tablet Take 20 mg by mouth every evening.         Past Surgical History:   Procedure Laterality Date    ANGIOGRAM, CORONARY, WITH LEFT HEART CATHETERIZATION Left 11/2/2020    Procedure: Angiogram, Coronary, with Left Heart Cath;  Surgeon: Wil Rose MD;  Location: [facility] CATH LAB;  Service: Cardiology;  Laterality: Left;    APPENDECTOMY      BREAST SURGERY      right breast lumpectomy    CHOLECYSTECTOMY      HAND SURGERY Right        OBJECTIVE:     Vital Signs Range (Last 24H):         Significant Labs:  Lab Results   Component Value Date    WBC 11.53 06/14/2024    HGB 13.2 06/14/2024    HCT 37.9 06/14/2024     06/14/2024    CHOL 228 (H) 05/31/2024    TRIG 175 (H) 05/31/2024    HDL 63 05/31/2024    ALT 21 06/14/2024    AST 43 (H) 06/14/2024     06/14/2024    K 4.2 06/14/2024     06/14/2024    CREATININE 1.0 06/14/2024    BUN 14 06/14/2024    CO2 19 (L) 06/14/2024    TSH 1.569 10/30/2020    INR 1.1 10/30/2020    HGBA1C 5.1 05/31/2024       Diagnostic Studies: No relevant studies.    EKG:   Results for orders placed or performed during the hospital encounter of 10/30/20   EKG 12-LEAD on arrival to floor    Collection Time: 11/02/20  1:33 PM    Narrative    Test Reason : I25.10,    Vent. Rate : 059 BPM     Atrial Rate : 059 BPM     P-R Int : 164 ms          QRS Dur : 084 ms      QT Int : 428 ms       P-R-T Axes : 032 019  208 degrees     QTc Int : 423 ms    Sinus bradycardia  ST and T wave abnormality, consider inferior ischemia  Abnormal ECG  When compared with ECG of 02-NOV-2020 09:41,  No significant change was found  Confirmed by Keenan Welsh MD ([id number]) on 11/3/2020 6:18:38 AM    Referred By: BARRIE   SELF           Confirmed By:Keenan Welsh MD       ASSESSMENT/PLAN:     Pre-op Assessment    I have reviewed the Patient Summary Reports.     I have reviewed the Nursing Notes. I have reviewed the NPO Status.   I have reviewed the Medications.     Review of Systems  Anesthesia Hx:  No problems with previous Anesthesia               Denies Personal Hx of Anesthesia complications.                    Social:  Non-Smoker       Hematology/Oncology:       -- Denies Anemia:                  Denies Current/Recent Cancer                Cardiovascular:     Hypertension   Denies MI.  Denies CAD.     Denies Dysrhythmias.    Denies CHF.    no hyperlipidemia   ECG has been reviewed.    Functional Capacity good / => 4 METS                   Hypertension         Pulmonary:    Denies COPD.  Denies Asthma.   Denies Shortness of breath.  Sleep Apnea     Obstructive Sleep Apnea (JOVANNA).           Renal/:  Chronic Renal Disease        Kidney Function/Disease             Hepatic/GI:      Denies GERD.                Musculoskeletal:  Musculoskeletal Normal                Neurological:  Denies TIA.  Denies CVA.    Denies Seizures.                                Endocrine:  Diabetes    Diabetes                          Physical Exam  General: Well nourished, Cooperative, Alert and Oriented    Airway:  Mallampati: II   Mouth Opening: Normal  TM Distance: Normal  Tongue: Normal  Neck ROM: Normal ROM    Dental:  Intact        Anesthesia Plan  Type of Anesthesia, risks & benefits discussed:    Anesthesia Type: Gen Natural Airway  Intra-op Monitoring Plan: Standard ASA Monitors  Post Op Pain Control Plan: multimodal analgesia  Induction:  IV  Informed  Consent: Informed consent signed with the Patient and all parties understand the risks and agree with anesthesia plan.  All questions answered.   ASA Score: 3  Day of Surgery Review of History & Physical: H&P Update referred to the surgeon/provider.    Ready For Surgery From Anesthesia Perspective.     .

## 2024-11-13 ENCOUNTER — OFFICE VISIT (OUTPATIENT)
Dept: OTOLARYNGOLOGY | Facility: CLINIC | Age: 84
End: 2024-11-13
Payer: MEDICARE

## 2024-11-13 ENCOUNTER — PATIENT MESSAGE (OUTPATIENT)
Dept: OTOLARYNGOLOGY | Facility: CLINIC | Age: 84
End: 2024-11-13
Payer: MEDICARE

## 2024-11-13 ENCOUNTER — NURSE TRIAGE (OUTPATIENT)
Dept: ADMINISTRATIVE | Facility: CLINIC | Age: 84
End: 2024-11-13
Payer: MEDICARE

## 2024-11-13 VITALS
HEART RATE: 47 BPM | SYSTOLIC BLOOD PRESSURE: 168 MMHG | BODY MASS INDEX: 20.57 KG/M2 | HEIGHT: 66 IN | DIASTOLIC BLOOD PRESSURE: 68 MMHG | WEIGHT: 128 LBS

## 2024-11-13 DIAGNOSIS — H93.8X1 EAR MASS, RIGHT: Primary | ICD-10-CM

## 2024-11-13 PROCEDURE — 99024 POSTOP FOLLOW-UP VISIT: CPT | Mod: S$GLB,POP,, | Performed by: STUDENT IN AN ORGANIZED HEALTH CARE EDUCATION/TRAINING PROGRAM

## 2024-11-13 NOTE — TELEPHONE ENCOUNTER
Spoke with daughter of pt who reports that pt had surgery to left ear yesterday. Reports that pt had bleeding to ear yesterday. States this morning that pt ear is blocked, and having decreased hearing due to bleeding. Denies active bleeding now. States she is unable to tell where incision is to help unblock ear canal. Did send  picture through [employer] portal. Advised will send message to office,and should have callback.    Reason for Disposition   Caller has NON-URGENT question and triager unable to answer question    Additional Information   Negative: Sounds like a life-threatening emergency to the triager   Negative: Bright red, wide-spread, sunburn-like rash   Negative: SEVERE headache (e.g., excruciating) and after spinal (epidural) anesthesia   Negative: Vomiting and persists > 4 hours   Negative: Vomiting and abdomen looks much more swollen than usual   Negative: Drinking very little and dehydration suspected (e.g., no urine > 12 hours, very dry mouth, very lightheaded)   Negative: Patient sounds very sick or weak to the triager   Negative: Sounds like a serious complication to the triager   Negative: Fever > 100.4 F (38.0 C)   Negative: Caller has URGENT question and triager unable to answer question   Negative: SEVERE post-op pain (e.g., excruciating, pain scale 8-10) that is not controlled with pain medications   Negative: MILD - MODERATE headache (e.g., pain scale 1-7)  and after spinal (epidural) anesthesia   Negative: Fever present > 3 days (72 hours)   Negative: Patient wants to be seen   Negative: MILD TO MODERATE post-op pain (e.g., pain scale 1-7) that is not controlled with pain medications    Protocols used: Post-Op Symptoms and [id number]

## 2024-11-13 NOTE — PROGRESS NOTES
Patient presents postoperative day 1 of right ear mass excision and advancement rotation flap for closure.  The patient's daughter called today and sent a picture showing some swelling along the preauricular area.  As such, I asked her to be brought into my clinic for evaluation.      On exam, the preauricular area is non fluctuant and appropriately tender to the touch.  There is some expected postoperative ecchymosis.  There is no evidence of hematoma.    I cleanse the incision line that had accumulated some crusting with hydrogen peroxide and Aquaphor.  I discussed postoperative wound cares with the patient and her daughter.  I will plan to see the patient back next week for an additional wound evaluation.  I encouraged the patient and her daughter to reach out to us should she have any questions in the interim.    Denis Fry MD  Facial Plastic and Reconstructive Surgeon  [facility] - [facility]

## 2024-11-14 ENCOUNTER — PATIENT MESSAGE (OUTPATIENT)
Dept: ADMINISTRATIVE | Facility: HOSPITAL | Age: 84
End: 2024-11-14
Payer: MEDICARE

## 2024-11-15 LAB
FINAL PATHOLOGIC DIAGNOSIS: NORMAL
GROSS: NORMAL
Lab: NORMAL
MICROSCOPIC EXAM: NORMAL

## 2024-11-20 ENCOUNTER — OFFICE VISIT (OUTPATIENT)
Dept: OTOLARYNGOLOGY | Facility: CLINIC | Age: 84
End: 2024-11-20
Payer: MEDICARE

## 2024-11-20 VITALS
SYSTOLIC BLOOD PRESSURE: 173 MMHG | BODY MASS INDEX: 20.53 KG/M2 | WEIGHT: 127.19 LBS | DIASTOLIC BLOOD PRESSURE: 84 MMHG | HEART RATE: 84 BPM

## 2024-11-20 DIAGNOSIS — H93.8X1 EAR MASS, RIGHT: Primary | ICD-10-CM

## 2024-11-20 PROCEDURE — 99999 PR PBB SHADOW E&M-EST. PATIENT-LVL III: CPT | Mod: PBBFAC,,, | Performed by: STUDENT IN AN ORGANIZED HEALTH CARE EDUCATION/TRAINING PROGRAM

## 2024-11-20 PROCEDURE — 99024 POSTOP FOLLOW-UP VISIT: CPT | Mod: POP,,, | Performed by: STUDENT IN AN ORGANIZED HEALTH CARE EDUCATION/TRAINING PROGRAM

## 2024-11-20 PROCEDURE — 99213 OFFICE O/P EST LOW 20 MIN: CPT | Mod: PBBFAC | Performed by: STUDENT IN AN ORGANIZED HEALTH CARE EDUCATION/TRAINING PROGRAM

## 2024-11-20 NOTE — PROGRESS NOTES
OTOLARYNGOLOGY- FACIAL PLASTIC & RECONSTRUCTIVE SURGERY      Ayaka Ellison  [id number]    CC:  Right ear mass    HISTORY OF PRESENT ILLNESS: Ayaka Ellison  is a 84 y.o. female who was referred by Dr. Bacon for ear lesion.  Ayaka reports she noticed this lesion starting in May 20, 2024.  She feels that the lesion has been steadily growing since then.  She denies any pain or hearing difficulty.  She states it has not drained.    She is otherwise healthy, has a history of high blood pressure, hyperlipidemia and is on a baby aspirin daily.      Path 10/9/24:    Final Pathologic Diagnosis 1. Skin, right tragus, shave biopsy:  - ECCRINE SPIRADENOMA WITH CYLINDROMA-LIKE FEATURES, INCOMPLETELY EXCISED.    This lesion is benign.       11/12/24 Surgery:    Procedure(s) Performed:       Excision of spiradenoma and advancement rotational flap closure of the right preauricular  and post tragal area, 5cm x 2.5cm = 12.5cm sq, CPT 16652     Attending Surgeon: Denis Fry MD  Resident (trainee): none       Anesthesia: General    Specimens: none.   Drains: None.   Estimated Blood Loss: 5 mL.      Operative Findings:    Soft tissue mass excised, well developed plane between the post tragal cartilage and mass.  Advancement rotational flap of significantly lax preauricular skin allowed for tension free closure of the defect.      11/20/2024:     Patient follows up for postoperative visit.  Her swelling has improved as has her right-sided facial pain.  She notes some crusting along the right ear.        Component 8 d ago   Final Pathologic Diagnosis 1. Skin, right ear, excision:   - CYLINDROMA (see comment)    Comment:  There is significant histomorphologic overlap between spiradenoma and cylindroma.  Tumor is focally present at the deep margin.  There is no histologic evidence of atypia.   Comment: Interp By Ana Rosa Vann MD., Signed on 11/15/2024 at 12:02          Occupation/Family:  She is present with her daughter who  is friendly with Dr. Bacon      Past Medical History  She has a past medical history of Arthritis, Cataract, Glaucoma, Hyperlipidemia, Hypertension, and Sleep apnea.    Past Surgical History  She has a past surgical history that includes Cholecystectomy; Appendectomy; Breast surgery; Hand surgery (Right); ANGIOGRAM, CORONARY, WITH LEFT HEART CATHETERIZATION (Left, 11/2/2020); Excision of lesion of ear (Right, 11/12/2024); and Adjacent tissue transfer (Right, 11/12/2024).    Family History  Her family history is not on file.    Social History  She reports that she quit smoking about 13 years ago. Her smoking use included vaping w/o nicotine. She has never used smokeless tobacco. She reports current alcohol use. She reports that she does not use drugs.    Allergies  She is allergic to penicillins and codeine.    Medications  She has a current medication list which includes the following prescription(s): aspirin, calcium-vitamin d3, cyanocobalamin, ibuprofen, lactobac no.41-bifidobact no.7, latanoprost, losartan, magnesium, multivitamin-ca-iron-minerals, omega 3,6,9 combination no.7, potassium citrate, and simvastatin.      Review of Systems  See above    PHYSICAL EXAM:  BP (!) 173/84 (BP Location: Left arm, Patient Position: Sitting)   Pulse 84   Wt 57.7 kg (127 lb 3.3 oz)   BMI 20.53 kg/m²     General: Alert and oriented in no acute distress  Ears:  Her right preauricular incision is well healed.  She has some crusting along her posterior incision.  Incisions are clean dry and intact.        Imaging:  None    ASSESSMENT:   1. Ear mass, right              PLAN:     Postoperative wound care as discussed and pathology reviewed.  I will plan to see the patient back in 1 month to assess interval healing.      I had an elaborate discussion with the patient and her daughter  regarding her condition and the further workup and management options. They are in understanding of the above stated plan.    I have spent  approximately 45 minutes on this patient encounter. This includes face to face time and non-face to face time preparing to see the patient (eg, review of tests), obtaining and/or reviewing separately obtained history, documenting clinical information in the electronic or other health record, independently interpreting results and communicating results to the patient/family/caregiver, or care coordinator.      Denis Fry MD  Facial Plastic and Reconstructive Surgeon  [facility] - [facility]

## 2025-02-21 DIAGNOSIS — Z00.00 ENCOUNTER FOR MEDICARE ANNUAL WELLNESS EXAM: ICD-10-CM

## 2025-02-28 ENCOUNTER — PATIENT MESSAGE (OUTPATIENT)
Dept: ADMINISTRATIVE | Facility: HOSPITAL | Age: 85
End: 2025-02-28
Payer: MEDICARE

## 2025-06-02 ENCOUNTER — PATIENT MESSAGE (OUTPATIENT)
Dept: PRIMARY CARE CLINIC | Facility: CLINIC | Age: 85
End: 2025-06-02
Payer: MEDICARE

## 2025-06-17 ENCOUNTER — PATIENT MESSAGE (OUTPATIENT)
Dept: ADMINISTRATIVE | Facility: HOSPITAL | Age: 85
End: 2025-06-17
Payer: MEDICARE

## 2025-06-23 ENCOUNTER — PATIENT OUTREACH (OUTPATIENT)
Dept: ADMINISTRATIVE | Facility: HOSPITAL | Age: 85
End: 2025-06-23
Payer: MEDICARE

## 2025-06-23 DIAGNOSIS — Z78.0 MENOPAUSE: Primary | ICD-10-CM

## 2025-06-23 NOTE — PROGRESS NOTES
Placed order for bone density scan. Notified Pt via portal message.  Chart reviewed, immunizations reconciled, Care Everywhere updated.  Jasmeet Cervantes LPN  Care Coordinator  [facility]

## 2025-06-30 ENCOUNTER — PATIENT MESSAGE (OUTPATIENT)
Dept: ADMINISTRATIVE | Facility: HOSPITAL | Age: 85
End: 2025-06-30
Payer: MEDICARE

## 2025-07-15 ENCOUNTER — PATIENT MESSAGE (OUTPATIENT)
Dept: PRIMARY CARE CLINIC | Facility: CLINIC | Age: 85
End: 2025-07-15
Payer: MEDICARE

## (undated) DEVICE — FORCEP BIPOLAR ADSON 1MM TIP

## (undated) DEVICE — SPIKE CONTRAST CONTROLLER

## (undated) DEVICE — KIT GLIDESHEATH SLEND 6FR 10CM

## (undated) DEVICE — ELECTRODE REM PLYHSV RETURN 9

## (undated) DEVICE — SYR 3CC LUER LOC

## (undated) DEVICE — SEE MEDLINE ITEM 157187

## (undated) DEVICE — CATH IMPULSE 5F 100CM FR4

## (undated) DEVICE — GUIDE WIRE WHOLEY EXCHANGE 300

## (undated) DEVICE — SPONGE GAUZE 4X4 12 PLY STRL

## (undated) DEVICE — APPLICATOR COT TIP WD STRL 3IN

## (undated) DEVICE — KIT CUSTOM MANIFOLD

## (undated) DEVICE — DRAPE THREE-QTR REINF 53X77IN

## (undated) DEVICE — TOWEL OR DISP STRL BLUE 4/PK

## (undated) DEVICE — OMNIPAQUE 350 200ML

## (undated) DEVICE — TRAY MUSCLE LID EYE OMC

## (undated) DEVICE — HEMOSTAT VASC BAND REG 24CM

## (undated) DEVICE — PROTECTION STATION PLUS

## (undated) DEVICE — ELECTRODE NEEDLE 1IN

## (undated) DEVICE — NDL HYPO STD REG BVL 30G 0.5IN

## (undated) DEVICE — SEE MEDLINE ITEM 156894

## (undated) DEVICE — DRAPE SPLIT ADHESIVE 77X120IN

## (undated) DEVICE — CATH FL 3.5 5FR